# Patient Record
Sex: FEMALE | Race: WHITE | Employment: OTHER | URBAN - NONMETROPOLITAN AREA
[De-identification: names, ages, dates, MRNs, and addresses within clinical notes are randomized per-mention and may not be internally consistent; named-entity substitution may affect disease eponyms.]

---

## 2017-04-11 ENCOUNTER — HOSPITAL ENCOUNTER (OUTPATIENT)
Dept: LAB | Age: 62
Discharge: HOME | End: 2017-04-11
Payer: MEDICAID

## 2017-04-11 LAB
INR BLD: 2.8 (ref 0.9–1.2)
PROTHROMBIN TIME: 30.6 SEC (ref 9.7–12.2)

## 2017-04-11 PROCEDURE — 36415 COLL VENOUS BLD VENIPUNCTURE: CPT

## 2017-04-11 PROCEDURE — 85610 PROTHROMBIN TIME: CPT

## 2017-05-15 RX ORDER — PAROXETINE HYDROCHLORIDE 20 MG/1
20 TABLET, FILM COATED ORAL EVERY MORNING
COMMUNITY
End: 2020-08-06

## 2017-05-16 ENCOUNTER — HOSPITAL ENCOUNTER (OUTPATIENT)
Dept: NON INVASIVE DIAGNOSTICS | Age: 62
Discharge: HOME | End: 2017-05-16
Payer: MEDICAID

## 2017-05-16 VITALS
RESPIRATION RATE: 14 BRPM | TEMPERATURE: 96.8 F | HEART RATE: 54 BPM | DIASTOLIC BLOOD PRESSURE: 63 MMHG | OXYGEN SATURATION: 100 % | SYSTOLIC BLOOD PRESSURE: 135 MMHG

## 2017-05-16 LAB
LV EF: 65 %
LVEF MODALITY: NORMAL

## 2017-05-16 PROCEDURE — 6360000002 HC RX W HCPCS: Performed by: INTERNAL MEDICINE

## 2017-05-16 PROCEDURE — 93312 ECHO TRANSESOPHAGEAL: CPT

## 2017-05-16 PROCEDURE — 7100000010 HC PHASE II RECOVERY - FIRST 15 MIN

## 2017-05-16 PROCEDURE — 7100000011 HC PHASE II RECOVERY - ADDTL 15 MIN

## 2017-05-16 PROCEDURE — 6360000002 HC RX W HCPCS

## 2017-05-16 RX ORDER — FENTANYL CITRATE 50 UG/ML
150 INJECTION, SOLUTION INTRAMUSCULAR; INTRAVENOUS ONCE
Status: COMPLETED | OUTPATIENT
Start: 2017-05-16 | End: 2017-05-16

## 2017-05-16 RX ORDER — SODIUM CHLORIDE 0.9 % (FLUSH) 0.9 %
10 SYRINGE (ML) INJECTION EVERY 12 HOURS SCHEDULED
Status: DISCONTINUED | OUTPATIENT
Start: 2017-05-16 | End: 2017-05-17 | Stop reason: HOSPADM

## 2017-05-16 RX ORDER — SODIUM CHLORIDE 0.9 % (FLUSH) 0.9 %
10 SYRINGE (ML) INJECTION PRN
Status: DISCONTINUED | OUTPATIENT
Start: 2017-05-16 | End: 2017-05-17 | Stop reason: HOSPADM

## 2017-05-16 RX ORDER — MIDAZOLAM HYDROCHLORIDE 1 MG/ML
8 INJECTION INTRAMUSCULAR; INTRAVENOUS ONCE
Status: COMPLETED | OUTPATIENT
Start: 2017-05-16 | End: 2017-05-16

## 2017-05-16 RX ADMIN — FENTANYL CITRATE 150 MCG: 50 INJECTION INTRAMUSCULAR; INTRAVENOUS at 14:54

## 2017-05-16 RX ADMIN — MIDAZOLAM 8 MG: 1 INJECTION INTRAMUSCULAR; INTRAVENOUS at 14:56

## 2017-05-16 ASSESSMENT — PAIN SCALES - GENERAL
PAINLEVEL_OUTOF10: 0
PAINLEVEL_OUTOF10: 2

## 2017-05-16 ASSESSMENT — PAIN DESCRIPTION - DESCRIPTORS: DESCRIPTORS: SORE

## 2017-05-16 ASSESSMENT — PAIN DESCRIPTION - LOCATION: LOCATION: THROAT

## 2017-05-26 ENCOUNTER — HOSPITAL ENCOUNTER (OUTPATIENT)
Dept: CT IMAGING | Age: 62
Discharge: HOME | End: 2017-05-26
Payer: MEDICAID

## 2017-05-26 DIAGNOSIS — I71.20 THORACIC ANEURYSM WITHOUT MENTION OF RUPTURE: ICD-10-CM

## 2017-05-26 DIAGNOSIS — Z95.4 S/P AORTIC VALVE ALLOGRAFT: ICD-10-CM

## 2017-05-26 PROCEDURE — 71275 CT ANGIOGRAPHY CHEST: CPT

## 2017-05-26 PROCEDURE — 6360000004 HC RX CONTRAST MEDICATION: Performed by: INTERNAL MEDICINE

## 2017-05-26 RX ADMIN — IOVERSOL 80 ML: 741 INJECTION INTRA-ARTERIAL; INTRAVENOUS at 11:19

## 2017-06-07 ENCOUNTER — HOSPITAL ENCOUNTER (OUTPATIENT)
Dept: LAB | Age: 62
Discharge: HOME | End: 2017-06-07
Payer: MEDICAID

## 2017-06-07 LAB
INR BLD: 2.4 (ref 0.9–1.2)
PROTHROMBIN TIME: 26.9 SEC (ref 9.7–12.2)

## 2017-06-07 PROCEDURE — 85610 PROTHROMBIN TIME: CPT

## 2017-06-07 PROCEDURE — 36415 COLL VENOUS BLD VENIPUNCTURE: CPT

## 2017-06-30 ENCOUNTER — HOSPITAL ENCOUNTER (OUTPATIENT)
Dept: LAB | Age: 62
Discharge: HOME | End: 2017-06-30
Payer: MEDICAID

## 2017-06-30 LAB
INR BLD: 3.5 (ref 0.9–1.2)
PROTHROMBIN TIME: 39.5 SEC (ref 9.7–12.2)

## 2017-06-30 PROCEDURE — 36415 COLL VENOUS BLD VENIPUNCTURE: CPT

## 2017-06-30 PROCEDURE — 85610 PROTHROMBIN TIME: CPT

## 2017-08-15 ENCOUNTER — HOSPITAL ENCOUNTER (OUTPATIENT)
Dept: LAB | Age: 62
Discharge: HOME | End: 2017-08-15
Payer: MEDICAID

## 2017-08-15 LAB
INR BLD: 2.4 (ref 0.9–1.2)
PROTHROMBIN TIME: 26.9 SEC (ref 9.7–12.2)

## 2017-08-15 PROCEDURE — 36415 COLL VENOUS BLD VENIPUNCTURE: CPT

## 2017-08-15 PROCEDURE — 85610 PROTHROMBIN TIME: CPT

## 2017-09-07 ENCOUNTER — HOSPITAL ENCOUNTER (OUTPATIENT)
Dept: LAB | Age: 62
Discharge: HOME | End: 2017-09-07
Payer: MEDICAID

## 2017-09-07 LAB
INR BLD: 2.3 (ref 0.9–1.2)
PROTHROMBIN TIME: 25.4 SEC (ref 9.7–12.2)

## 2017-09-07 PROCEDURE — 36415 COLL VENOUS BLD VENIPUNCTURE: CPT

## 2017-09-07 PROCEDURE — 85610 PROTHROMBIN TIME: CPT

## 2017-09-21 ENCOUNTER — HOSPITAL ENCOUNTER (OUTPATIENT)
Dept: LAB | Age: 62
Discharge: HOME | End: 2017-09-21
Payer: MEDICAID

## 2017-09-21 LAB
INR BLD: 3.3 (ref 0.9–1.2)
PROTHROMBIN TIME: 37.4 SEC (ref 9.7–12.2)

## 2017-09-21 PROCEDURE — 36415 COLL VENOUS BLD VENIPUNCTURE: CPT

## 2017-09-21 PROCEDURE — 85610 PROTHROMBIN TIME: CPT

## 2017-10-12 ENCOUNTER — HOSPITAL ENCOUNTER (OUTPATIENT)
Dept: LAB | Age: 62
Discharge: HOME | End: 2017-10-12
Payer: MEDICAID

## 2017-10-12 LAB
INR BLD: 2.8 (ref 0.9–1.2)
PROTHROMBIN TIME: 30.9 SEC (ref 9.7–12.2)

## 2017-10-12 PROCEDURE — 36415 COLL VENOUS BLD VENIPUNCTURE: CPT

## 2017-10-12 PROCEDURE — 85610 PROTHROMBIN TIME: CPT

## 2017-11-06 ENCOUNTER — HOSPITAL ENCOUNTER (OUTPATIENT)
Dept: LAB | Age: 62
Discharge: HOME | End: 2017-11-06
Payer: COMMERCIAL

## 2017-11-06 LAB
INR BLD: 4.2 (ref 0.9–1.2)
PROTHROMBIN TIME: 48.4 SEC (ref 9.7–12.2)

## 2017-11-06 PROCEDURE — 85610 PROTHROMBIN TIME: CPT

## 2017-11-06 PROCEDURE — 36415 COLL VENOUS BLD VENIPUNCTURE: CPT

## 2017-11-20 ENCOUNTER — HOSPITAL ENCOUNTER (OUTPATIENT)
Dept: LAB | Age: 62
Discharge: HOME | End: 2017-11-20
Payer: COMMERCIAL

## 2017-11-20 LAB
INR BLD: 4 (ref 0.9–1.2)
PROTHROMBIN TIME: 45 SEC (ref 9.7–12.2)

## 2017-11-20 PROCEDURE — 85610 PROTHROMBIN TIME: CPT

## 2017-11-20 PROCEDURE — 36415 COLL VENOUS BLD VENIPUNCTURE: CPT

## 2017-12-11 ENCOUNTER — HOSPITAL ENCOUNTER (OUTPATIENT)
Dept: LAB | Age: 62
Discharge: HOME | End: 2017-12-11
Payer: COMMERCIAL

## 2017-12-11 LAB
INR BLD: 1.7 (ref 0.9–1.2)
PROTHROMBIN TIME: 18.7 SEC (ref 9.7–12.2)

## 2017-12-11 PROCEDURE — 36415 COLL VENOUS BLD VENIPUNCTURE: CPT

## 2017-12-11 PROCEDURE — 85610 PROTHROMBIN TIME: CPT

## 2018-01-09 ENCOUNTER — HOSPITAL ENCOUNTER (OUTPATIENT)
Dept: LAB | Age: 63
Discharge: HOME | End: 2018-01-09
Payer: COMMERCIAL

## 2018-01-09 LAB
INR BLD: 2.7 (ref 0.9–1.2)
PROTHROMBIN TIME: 29.4 SEC (ref 9.7–12.2)

## 2018-01-09 PROCEDURE — 36415 COLL VENOUS BLD VENIPUNCTURE: CPT

## 2018-01-09 PROCEDURE — 85610 PROTHROMBIN TIME: CPT

## 2018-02-28 ENCOUNTER — HOSPITAL ENCOUNTER (OUTPATIENT)
Dept: LAB | Age: 63
Discharge: HOME | End: 2018-02-28
Payer: COMMERCIAL

## 2018-02-28 LAB
INR BLD: 2.8 (ref 0.9–1.2)
PROTHROMBIN TIME: 27.8 SEC (ref 9.7–12.2)

## 2018-02-28 PROCEDURE — 36415 COLL VENOUS BLD VENIPUNCTURE: CPT

## 2018-02-28 PROCEDURE — 85610 PROTHROMBIN TIME: CPT

## 2018-04-11 ENCOUNTER — HOSPITAL ENCOUNTER (OUTPATIENT)
Dept: LAB | Age: 63
Discharge: HOME | End: 2018-04-11
Payer: COMMERCIAL

## 2018-04-11 LAB
ANION GAP SERPL CALCULATED.3IONS-SCNC: 12 MMOL/L (ref 9–17)
BUN BLDV-MCNC: 28 MG/DL (ref 8–23)
BUN/CREAT BLD: 26 (ref 9–20)
CALCIUM SERPL-MCNC: 10.1 MG/DL (ref 8.6–10.4)
CHLORIDE BLD-SCNC: 101 MMOL/L (ref 98–107)
CO2: 28 MMOL/L (ref 20–31)
CREAT SERPL-MCNC: 1.07 MG/DL (ref 0.5–0.9)
GFR AFRICAN AMERICAN: >60 ML/MIN
GFR NON-AFRICAN AMERICAN: 52 ML/MIN
GFR SERPL CREATININE-BSD FRML MDRD: ABNORMAL ML/MIN/{1.73_M2}
GFR SERPL CREATININE-BSD FRML MDRD: ABNORMAL ML/MIN/{1.73_M2}
GLUCOSE BLD-MCNC: 86 MG/DL (ref 70–99)
POTASSIUM SERPL-SCNC: 4.9 MMOL/L (ref 3.7–5.3)
SODIUM BLD-SCNC: 141 MMOL/L (ref 135–144)

## 2018-04-11 PROCEDURE — 36415 COLL VENOUS BLD VENIPUNCTURE: CPT

## 2018-04-11 PROCEDURE — 80048 BASIC METABOLIC PNL TOTAL CA: CPT

## 2018-04-24 ENCOUNTER — HOSPITAL ENCOUNTER (OUTPATIENT)
Dept: LAB | Age: 63
Discharge: HOME | End: 2018-04-24
Payer: COMMERCIAL

## 2018-04-24 LAB
INR BLD: 2.6 (ref 0.9–1.2)
PROTHROMBIN TIME: 26 SEC (ref 9.7–12.2)

## 2018-04-24 PROCEDURE — 36415 COLL VENOUS BLD VENIPUNCTURE: CPT

## 2018-04-24 PROCEDURE — 85610 PROTHROMBIN TIME: CPT

## 2018-06-04 ENCOUNTER — HOSPITAL ENCOUNTER (OUTPATIENT)
Dept: LAB | Age: 63
Discharge: HOME | End: 2018-06-04
Payer: COMMERCIAL

## 2018-06-04 LAB
INR BLD: 3.8 (ref 0.9–1.2)
PROTHROMBIN TIME: 36.8 SEC (ref 9.7–12.2)

## 2018-06-04 PROCEDURE — 36415 COLL VENOUS BLD VENIPUNCTURE: CPT

## 2018-06-04 PROCEDURE — 85610 PROTHROMBIN TIME: CPT

## 2018-07-10 ENCOUNTER — HOSPITAL ENCOUNTER (OUTPATIENT)
Dept: LAB | Age: 63
Discharge: HOME | End: 2018-07-10
Payer: COMMERCIAL

## 2018-07-10 LAB
INR BLD: 3.3 (ref 0.9–1.2)
PROTHROMBIN TIME: 32.6 SEC (ref 9.7–12.2)

## 2018-07-10 PROCEDURE — 36415 COLL VENOUS BLD VENIPUNCTURE: CPT

## 2018-07-10 PROCEDURE — 85610 PROTHROMBIN TIME: CPT

## 2018-09-06 ENCOUNTER — HOSPITAL ENCOUNTER (OUTPATIENT)
Dept: LAB | Age: 63
Discharge: HOME | End: 2018-09-06
Payer: COMMERCIAL

## 2018-09-06 LAB
INR BLD: 4 (ref 0.9–1.2)
PROTHROMBIN TIME: 38.4 SEC (ref 9.7–12.2)

## 2018-09-06 PROCEDURE — 85610 PROTHROMBIN TIME: CPT

## 2018-09-06 PROCEDURE — 36415 COLL VENOUS BLD VENIPUNCTURE: CPT

## 2018-10-02 ENCOUNTER — HOSPITAL ENCOUNTER (OUTPATIENT)
Dept: LAB | Age: 63
Discharge: HOME | End: 2018-10-02
Payer: COMMERCIAL

## 2018-10-02 LAB
INR BLD: 4.3 (ref 0.9–1.2)
PROTHROMBIN TIME: 41.5 SEC (ref 9.7–12.2)

## 2018-10-02 PROCEDURE — 36415 COLL VENOUS BLD VENIPUNCTURE: CPT

## 2018-10-02 PROCEDURE — 85610 PROTHROMBIN TIME: CPT

## 2018-11-09 ENCOUNTER — HOSPITAL ENCOUNTER (OUTPATIENT)
Dept: LAB | Age: 63
Discharge: HOME | End: 2018-11-09
Payer: COMMERCIAL

## 2018-11-09 LAB
INR BLD: 2.3 (ref 0.9–1.2)
PROTHROMBIN TIME: 23.4 SEC (ref 9.7–12.2)

## 2018-11-09 PROCEDURE — 85610 PROTHROMBIN TIME: CPT

## 2018-11-09 PROCEDURE — 36415 COLL VENOUS BLD VENIPUNCTURE: CPT

## 2018-12-27 ENCOUNTER — HOSPITAL ENCOUNTER (OUTPATIENT)
Dept: LAB | Age: 63
Discharge: HOME | End: 2018-12-27
Payer: COMMERCIAL

## 2018-12-27 LAB
INR BLD: 3 (ref 0.9–1.2)
PROTHROMBIN TIME: 29.4 SEC (ref 9.7–12.2)

## 2018-12-27 PROCEDURE — 36415 COLL VENOUS BLD VENIPUNCTURE: CPT

## 2018-12-27 PROCEDURE — 85610 PROTHROMBIN TIME: CPT

## 2019-02-25 ENCOUNTER — HOSPITAL ENCOUNTER (OUTPATIENT)
Dept: LAB | Age: 64
Discharge: HOME | End: 2019-02-25
Payer: COMMERCIAL

## 2019-02-25 LAB
INR BLD: 2.4 (ref 0.9–1.2)
PROTHROMBIN TIME: 23.9 SEC (ref 9.7–12.2)

## 2019-02-25 PROCEDURE — 36415 COLL VENOUS BLD VENIPUNCTURE: CPT

## 2019-02-25 PROCEDURE — 85610 PROTHROMBIN TIME: CPT

## 2019-03-27 ENCOUNTER — HOSPITAL ENCOUNTER (OUTPATIENT)
Dept: LAB | Age: 64
Discharge: HOME | End: 2019-03-27
Payer: COMMERCIAL

## 2019-03-27 LAB
INR BLD: 3.5 (ref 0.9–1.2)
PROTHROMBIN TIME: 34.5 SEC (ref 9.7–12.2)

## 2019-03-27 PROCEDURE — 36415 COLL VENOUS BLD VENIPUNCTURE: CPT

## 2019-03-27 PROCEDURE — 85610 PROTHROMBIN TIME: CPT

## 2019-05-22 ENCOUNTER — HOSPITAL ENCOUNTER (OUTPATIENT)
Dept: LAB | Age: 64
Discharge: HOME | End: 2019-05-22
Payer: COMMERCIAL

## 2019-05-22 LAB
INR BLD: 3.1 (ref 0.9–1.2)
PROTHROMBIN TIME: 30.4 SEC (ref 9.7–12.2)

## 2019-05-22 PROCEDURE — 85610 PROTHROMBIN TIME: CPT

## 2019-05-22 PROCEDURE — 36415 COLL VENOUS BLD VENIPUNCTURE: CPT

## 2019-07-03 ENCOUNTER — HOSPITAL ENCOUNTER (OUTPATIENT)
Dept: LAB | Age: 64
Discharge: HOME | End: 2019-07-03
Payer: COMMERCIAL

## 2019-07-03 LAB
INR BLD: 2.4 (ref 0.9–1.2)
PROTHROMBIN TIME: 23.7 SEC (ref 9.7–12.2)

## 2019-07-03 PROCEDURE — 36415 COLL VENOUS BLD VENIPUNCTURE: CPT

## 2019-07-03 PROCEDURE — 85610 PROTHROMBIN TIME: CPT

## 2019-07-31 ENCOUNTER — HOSPITAL ENCOUNTER (OUTPATIENT)
Dept: LAB | Age: 64
Discharge: HOME | End: 2019-07-31
Payer: COMMERCIAL

## 2019-07-31 LAB
INR BLD: 5.3 (ref 0.9–1.2)
PROTHROMBIN TIME: 50.7 SEC (ref 9.7–12.2)

## 2019-07-31 PROCEDURE — 36415 COLL VENOUS BLD VENIPUNCTURE: CPT

## 2019-07-31 PROCEDURE — 85610 PROTHROMBIN TIME: CPT

## 2019-08-07 ENCOUNTER — HOSPITAL ENCOUNTER (OUTPATIENT)
Dept: LAB | Age: 64
Discharge: HOME | End: 2019-08-07
Payer: COMMERCIAL

## 2019-08-07 LAB
INR BLD: 4.7 (ref 0.9–1.2)
PROTHROMBIN TIME: 45.3 SEC (ref 9.7–12.2)

## 2019-08-07 PROCEDURE — 36415 COLL VENOUS BLD VENIPUNCTURE: CPT

## 2019-08-07 PROCEDURE — 85610 PROTHROMBIN TIME: CPT

## 2019-08-14 ENCOUNTER — HOSPITAL ENCOUNTER (OUTPATIENT)
Dept: LAB | Age: 64
Discharge: HOME | End: 2019-08-14
Payer: COMMERCIAL

## 2019-08-14 LAB
INR BLD: 2.6 (ref 0.9–1.2)
PROTHROMBIN TIME: 25.7 SEC (ref 9.7–12.2)

## 2019-08-14 PROCEDURE — 36415 COLL VENOUS BLD VENIPUNCTURE: CPT

## 2019-08-14 PROCEDURE — 85610 PROTHROMBIN TIME: CPT

## 2019-09-10 ENCOUNTER — HOSPITAL ENCOUNTER (OUTPATIENT)
Dept: LAB | Age: 64
Discharge: HOME | End: 2019-09-10
Payer: COMMERCIAL

## 2019-09-10 LAB
INR BLD: 2.2 (ref 0.9–1.2)
PROTHROMBIN TIME: 21.9 SEC (ref 9.7–12.2)

## 2019-09-10 PROCEDURE — 85610 PROTHROMBIN TIME: CPT

## 2019-09-10 PROCEDURE — 36415 COLL VENOUS BLD VENIPUNCTURE: CPT

## 2019-10-25 ENCOUNTER — HOSPITAL ENCOUNTER (OUTPATIENT)
Age: 64
Discharge: HOME | End: 2019-10-25
Payer: COMMERCIAL

## 2019-10-25 LAB
INR BLD: 3.2 (ref 0.9–1.2)
PROTHROMBIN TIME: 31.4 SEC (ref 9.7–12.2)

## 2019-10-25 PROCEDURE — 85610 PROTHROMBIN TIME: CPT

## 2019-10-25 PROCEDURE — 36415 COLL VENOUS BLD VENIPUNCTURE: CPT

## 2019-12-03 ENCOUNTER — HOSPITAL ENCOUNTER (OUTPATIENT)
Dept: LAB | Age: 64
Discharge: HOME | End: 2019-12-03
Payer: COMMERCIAL

## 2019-12-03 LAB
INR BLD: 2.5 (ref 0.9–1.2)
PROTHROMBIN TIME: 24.7 SEC (ref 9.7–12.2)

## 2019-12-03 PROCEDURE — 85610 PROTHROMBIN TIME: CPT

## 2019-12-03 PROCEDURE — 36415 COLL VENOUS BLD VENIPUNCTURE: CPT

## 2020-01-14 ENCOUNTER — HOSPITAL ENCOUNTER (OUTPATIENT)
Dept: LAB | Age: 65
Discharge: HOME | End: 2020-01-14
Payer: COMMERCIAL

## 2020-01-14 LAB
INR BLD: 2.3 (ref 0.9–1.2)
PROTHROMBIN TIME: 23.3 SEC (ref 9.7–12.2)

## 2020-01-14 PROCEDURE — 36415 COLL VENOUS BLD VENIPUNCTURE: CPT

## 2020-01-14 PROCEDURE — 85610 PROTHROMBIN TIME: CPT

## 2020-02-12 ENCOUNTER — HOSPITAL ENCOUNTER (OUTPATIENT)
Dept: LAB | Age: 65
Discharge: HOME | End: 2020-02-12
Payer: COMMERCIAL

## 2020-02-12 LAB
ABSOLUTE EOS #: 0.13 K/UL (ref 0–0.44)
ABSOLUTE IMMATURE GRANULOCYTE: <0.03 K/UL (ref 0–0.3)
ABSOLUTE LYMPH #: 0.87 K/UL (ref 1.1–3.7)
ABSOLUTE MONO #: 0.49 K/UL (ref 0.1–1.2)
ALBUMIN SERPL-MCNC: 4.9 G/DL (ref 3.5–5.2)
ALBUMIN/GLOBULIN RATIO: 1.9 (ref 1–2.5)
ALP BLD-CCNC: 72 U/L (ref 35–104)
ALT SERPL-CCNC: 17 U/L (ref 5–33)
ANION GAP SERPL CALCULATED.3IONS-SCNC: 12 MMOL/L (ref 9–17)
AST SERPL-CCNC: 26 U/L
BASOPHILS # BLD: 1 % (ref 0–2)
BASOPHILS ABSOLUTE: 0.04 K/UL (ref 0–0.2)
BILIRUB SERPL-MCNC: 0.36 MG/DL (ref 0.3–1.2)
BUN BLDV-MCNC: 25 MG/DL (ref 8–23)
BUN/CREAT BLD: 26 (ref 9–20)
CALCIUM SERPL-MCNC: 9.8 MG/DL (ref 8.6–10.4)
CHLORIDE BLD-SCNC: 104 MMOL/L (ref 98–107)
CHOLESTEROL/HDL RATIO: 2.4
CHOLESTEROL: 246 MG/DL
CO2: 27 MMOL/L (ref 20–31)
CREAT SERPL-MCNC: 0.97 MG/DL (ref 0.5–0.9)
DIFFERENTIAL TYPE: ABNORMAL
EOSINOPHILS RELATIVE PERCENT: 3 % (ref 1–4)
GFR AFRICAN AMERICAN: >60 ML/MIN
GFR NON-AFRICAN AMERICAN: 58 ML/MIN
GFR SERPL CREATININE-BSD FRML MDRD: ABNORMAL ML/MIN/{1.73_M2}
GFR SERPL CREATININE-BSD FRML MDRD: ABNORMAL ML/MIN/{1.73_M2}
GLUCOSE BLD-MCNC: 101 MG/DL (ref 70–99)
HCT VFR BLD CALC: 44.1 % (ref 36.3–47.1)
HDLC SERPL-MCNC: 103 MG/DL
HEMOGLOBIN: 14 G/DL (ref 11.9–15.1)
IMMATURE GRANULOCYTES: 1 %
INR BLD: 3.1 (ref 0.9–1.2)
LDL CHOLESTEROL: 129 MG/DL (ref 0–130)
LYMPHOCYTES # BLD: 21 % (ref 24–43)
MCH RBC QN AUTO: 32 PG (ref 25.2–33.5)
MCHC RBC AUTO-ENTMCNC: 31.7 G/DL (ref 28.4–34.8)
MCV RBC AUTO: 100.7 FL (ref 82.6–102.9)
MONOCYTES # BLD: 12 % (ref 3–12)
NRBC AUTOMATED: 0 PER 100 WBC
PDW BLD-RTO: 12.5 % (ref 11.8–14.4)
PLATELET # BLD: ABNORMAL K/UL (ref 138–453)
PLATELET ESTIMATE: ABNORMAL
PLATELET, FLUORESCENCE: 125 K/UL (ref 138–453)
PLATELET, IMMATURE FRACTION: 2.8 % (ref 1.1–10.3)
PMV BLD AUTO: ABNORMAL FL (ref 8.1–13.5)
POTASSIUM SERPL-SCNC: 4.5 MMOL/L (ref 3.7–5.3)
PROTHROMBIN TIME: 30.8 SEC (ref 9.7–12.2)
RBC # BLD: 4.38 M/UL (ref 3.95–5.11)
RBC # BLD: ABNORMAL 10*6/UL
SEG NEUTROPHILS: 63 % (ref 36–65)
SEGMENTED NEUTROPHILS ABSOLUTE COUNT: 2.69 K/UL (ref 1.5–8.1)
SODIUM BLD-SCNC: 143 MMOL/L (ref 135–144)
TOTAL PROTEIN: 7.5 G/DL (ref 6.4–8.3)
TRIGL SERPL-MCNC: 72 MG/DL
VITAMIN D 25-HYDROXY: 110.1 NG/ML (ref 30–100)
VLDLC SERPL CALC-MCNC: ABNORMAL MG/DL (ref 1–30)
WBC # BLD: 4.2 K/UL (ref 3.5–11.3)
WBC # BLD: ABNORMAL 10*3/UL

## 2020-02-12 PROCEDURE — 82306 VITAMIN D 25 HYDROXY: CPT

## 2020-02-12 PROCEDURE — 85055 RETICULATED PLATELET ASSAY: CPT

## 2020-02-12 PROCEDURE — 80061 LIPID PANEL: CPT

## 2020-02-12 PROCEDURE — 80053 COMPREHEN METABOLIC PANEL: CPT

## 2020-02-12 PROCEDURE — 85025 COMPLETE CBC W/AUTO DIFF WBC: CPT

## 2020-02-12 PROCEDURE — 36415 COLL VENOUS BLD VENIPUNCTURE: CPT

## 2020-02-12 PROCEDURE — 85610 PROTHROMBIN TIME: CPT

## 2020-03-24 ENCOUNTER — HOSPITAL ENCOUNTER (OUTPATIENT)
Dept: LAB | Age: 65
Discharge: HOME | End: 2020-03-24
Payer: COMMERCIAL

## 2020-03-24 LAB
INR BLD: 3.8 (ref 0.9–1.2)
PROTHROMBIN TIME: 37.4 SEC (ref 9.7–12.2)

## 2020-03-24 PROCEDURE — 85610 PROTHROMBIN TIME: CPT

## 2020-03-24 PROCEDURE — 36415 COLL VENOUS BLD VENIPUNCTURE: CPT

## 2020-04-27 ENCOUNTER — HOSPITAL ENCOUNTER (OUTPATIENT)
Dept: LAB | Age: 65
Discharge: HOME | End: 2020-04-27
Payer: COMMERCIAL

## 2020-04-27 LAB
INR BLD: 3.5 (ref 0.9–1.2)
PROTHROMBIN TIME: 34.1 SEC (ref 9.7–12.2)

## 2020-04-27 PROCEDURE — 85610 PROTHROMBIN TIME: CPT

## 2020-04-27 PROCEDURE — 36415 COLL VENOUS BLD VENIPUNCTURE: CPT

## 2020-06-01 ENCOUNTER — HOSPITAL ENCOUNTER (OUTPATIENT)
Dept: LAB | Age: 65
Discharge: HOME | End: 2020-06-01
Payer: COMMERCIAL

## 2020-06-01 LAB
INR BLD: 2.4
PROTHROMBIN TIME: 26 SEC (ref 11.8–14.6)

## 2020-06-01 PROCEDURE — 36415 COLL VENOUS BLD VENIPUNCTURE: CPT

## 2020-06-01 PROCEDURE — 85610 PROTHROMBIN TIME: CPT

## 2020-08-06 ENCOUNTER — HOSPITAL ENCOUNTER (OUTPATIENT)
Age: 65
Discharge: HOME | End: 2020-08-06
Payer: COMMERCIAL

## 2020-08-06 ENCOUNTER — HOSPITAL ENCOUNTER (OUTPATIENT)
Dept: NON INVASIVE DIAGNOSTICS | Age: 65
Discharge: HOME | End: 2020-08-06
Payer: COMMERCIAL

## 2020-08-06 ENCOUNTER — OFFICE VISIT (OUTPATIENT)
Dept: CARDIOLOGY | Age: 65
End: 2020-08-06
Payer: COMMERCIAL

## 2020-08-06 VITALS
BODY MASS INDEX: 19.43 KG/M2 | OXYGEN SATURATION: 99 % | HEIGHT: 64 IN | RESPIRATION RATE: 16 BRPM | SYSTOLIC BLOOD PRESSURE: 152 MMHG | WEIGHT: 113.8 LBS | DIASTOLIC BLOOD PRESSURE: 67 MMHG | HEART RATE: 67 BPM

## 2020-08-06 LAB
ANION GAP SERPL CALCULATED.3IONS-SCNC: 10 MMOL/L (ref 9–17)
BUN BLDV-MCNC: 23 MG/DL (ref 8–23)
BUN/CREAT BLD: 29 (ref 9–20)
CALCIUM SERPL-MCNC: 10 MG/DL (ref 8.6–10.4)
CHLORIDE BLD-SCNC: 106 MMOL/L (ref 98–107)
CHOLESTEROL/HDL RATIO: 2.6
CHOLESTEROL: 249 MG/DL
CO2: 27 MMOL/L (ref 20–31)
CREAT SERPL-MCNC: 0.79 MG/DL (ref 0.5–0.9)
GFR AFRICAN AMERICAN: >60 ML/MIN
GFR NON-AFRICAN AMERICAN: >60 ML/MIN
GFR SERPL CREATININE-BSD FRML MDRD: ABNORMAL ML/MIN/{1.73_M2}
GFR SERPL CREATININE-BSD FRML MDRD: ABNORMAL ML/MIN/{1.73_M2}
GLUCOSE BLD-MCNC: 100 MG/DL (ref 70–99)
HCT VFR BLD CALC: 40.5 % (ref 36.3–47.1)
HDLC SERPL-MCNC: 95 MG/DL
HEMOGLOBIN: 13.2 G/DL (ref 11.9–15.1)
LDL CHOLESTEROL: 130 MG/DL (ref 0–130)
MCH RBC QN AUTO: 32.2 PG (ref 25.2–33.5)
MCHC RBC AUTO-ENTMCNC: 32.6 G/DL (ref 28.4–34.8)
MCV RBC AUTO: 98.8 FL (ref 82.6–102.9)
NRBC AUTOMATED: 0 PER 100 WBC
PDW BLD-RTO: 12.6 % (ref 11.8–14.4)
PLATELET # BLD: 133 K/UL (ref 138–453)
PMV BLD AUTO: 10.5 FL (ref 8.1–13.5)
POTASSIUM SERPL-SCNC: 4.5 MMOL/L (ref 3.7–5.3)
RBC # BLD: 4.1 M/UL (ref 3.95–5.11)
SODIUM BLD-SCNC: 143 MMOL/L (ref 135–144)
TRIGL SERPL-MCNC: 122 MG/DL
VLDLC SERPL CALC-MCNC: ABNORMAL MG/DL (ref 1–30)
WBC # BLD: 5.1 K/UL (ref 3.5–11.3)

## 2020-08-06 PROCEDURE — 80061 LIPID PANEL: CPT

## 2020-08-06 PROCEDURE — 93000 ELECTROCARDIOGRAM COMPLETE: CPT | Performed by: INTERNAL MEDICINE

## 2020-08-06 PROCEDURE — 80048 BASIC METABOLIC PNL TOTAL CA: CPT

## 2020-08-06 PROCEDURE — 36415 COLL VENOUS BLD VENIPUNCTURE: CPT

## 2020-08-06 PROCEDURE — 99204 OFFICE O/P NEW MOD 45 MIN: CPT | Performed by: INTERNAL MEDICINE

## 2020-08-06 PROCEDURE — 0296T HC EXT ECG RECORDING 2-21 DAY HOOKUP: CPT

## 2020-08-06 PROCEDURE — 93005 ELECTROCARDIOGRAM TRACING: CPT | Performed by: INTERNAL MEDICINE

## 2020-08-06 PROCEDURE — 85027 COMPLETE CBC AUTOMATED: CPT

## 2020-08-06 RX ORDER — POTASSIUM CHLORIDE 7.45 MG/ML
10 INJECTION INTRAVENOUS ONCE
COMMUNITY
End: 2020-08-11 | Stop reason: ALTCHOICE

## 2020-08-06 RX ORDER — FUROSEMIDE 20 MG/1
20 TABLET ORAL PRN
COMMUNITY
End: 2020-08-31 | Stop reason: ALTCHOICE

## 2020-08-06 RX ORDER — ATORVASTATIN CALCIUM 20 MG/1
20 TABLET, FILM COATED ORAL DAILY
Qty: 30 TABLET | Refills: 3 | Status: SHIPPED | OUTPATIENT
Start: 2020-08-06 | End: 2020-12-02

## 2020-08-06 RX ORDER — MAGNESIUM OXIDE 400 MG/1
400 TABLET ORAL DAILY
COMMUNITY
End: 2022-07-05

## 2020-08-06 RX ORDER — CARVEDILOL 6.25 MG/1
6.25 TABLET ORAL 2 TIMES DAILY
Qty: 90 TABLET | Refills: 3 | Status: SHIPPED | OUTPATIENT
Start: 2020-08-06 | End: 2021-02-04 | Stop reason: SDUPTHER

## 2020-08-06 RX ORDER — M-VIT,TX,IRON,MINS/CALC/FOLIC 27MG-0.4MG
1 TABLET ORAL DAILY
COMMUNITY

## 2020-08-06 RX ORDER — DIPHENHYDRAMINE HYDROCHLORIDE 25 MG/1
2 TABLET ORAL DAILY
COMMUNITY
End: 2021-05-25 | Stop reason: DRUGHIGH

## 2020-08-06 RX ORDER — LOSARTAN POTASSIUM 25 MG/1
25 TABLET ORAL DAILY
Qty: 90 TABLET | Refills: 3 | Status: SHIPPED | OUTPATIENT
Start: 2020-08-06 | End: 2021-07-30 | Stop reason: SDUPTHER

## 2020-08-06 NOTE — PROGRESS NOTES
Isis De Anda am scribing for and in the presence of Sunil Estrada MD, F.A.C.C..    Patient: Octaviano Yoo  : 1955  Date of Visit: 2020    REASON FOR VISIT / CONSULTATION: Establish Cardiologist (Rhuematic fever 50 years ago. Hx:CAD,CHF. Lightheaded/dizziness sometimes. Palpitations felt every morning. Denies: CP, SOB. )      History of Present Illness:        Dear Duane Hopping, APRN - CNP      I had the pleasure of seeing Octaviano Yoo in my office today. Ms. Nicole Cantrell is a 63 y.o. female who presented for evaluation and establish care. She has extensive cardiac history. History of rheumatic fever diagnosed in . She had ischemic colitis back in , this was probably embolic from atrial fibrillation. History of open heart surgery with 2 vessel bypass (SVG to LAD and SVG to OM1), aortic valve replacement with 19 mm Saint Zhao mechanical valve, mitral valve replacement with 27 mm Saint Zhao mechanical valve and maze procedure on 2008. Aortic valve replacement with 21 Amy-Barnett aortic valve and aortic root replacement with bovine patch in addition to tricuspid valve repair in     She has been maintained on warfarin since her episode of ischemic colitis and Aortic/mitral valve replacement with no bleeding complications. She told me that she has never been taking statin therapy. I looked back in the note from Dr. Farooq Hinson, the only thing I saw was fish oil. Patient had CABGx2 at the time of her mitral and aortic valve replacement. She has history of hypertension. Denied history of diabetes or dyslipidemia. Other relevant medical history includes migraine headaches and anxiety. She said she stopped taking Paxil because it made her gaining weight. Exercise Tolerance: Ms. Nicole Cantrell reports that she has a fairly good exercise tolerance.  Her says that she could walk 1 mile without developing chest discomfort or significant shortness of breath. Ms. Norm Handy is here today to establish care. She was following with Dr. Brooks Higginbotham in [place]. She does have some lightheaded/dizziness that comes and goes (once or twice a day- the room spins). No presyncope or syncopal episodes. She also feels palpitations every morning. She has lost weight since she stopped taking Paxil. She knows she is not drinking nearly enough fluids. She denies any chest pain, pressure or tightness. No significant shortness of breath. No orthopnea or PND. No fever or cough. No abdominal pain, nausea or vomiting. No problem moving her bowel. No joint or back pain. No bleeding complications from taking aspirin and warfarin. PAST MEDICAL HISTORY:         Past Medical History:   Diagnosis Date    Atrial fibrillation ([facility]     CAD (coronary artery disease)     Congestive heart failure (CHF) ([facility]     Depression 2015    Hypertension     Ischemic colitis ([facility] ,     Migraine     Migraine     Rheumatic fever        CURRENT ALLERGIES: Patient has no known allergies. REVIEW OF SYSTEMS: 14 systems were reviewed. Pertinent positives and negatives as above, all else negative.      Past Surgical History:   Procedure Laterality Date    CARDIAC VALVE REPLACEMENT      aortic and mitral    CHOLECYSTECTOMY      COLONOSCOPY      OVARIAN CYST REMOVAL Right     PRE-MALIGNANT / BENIGN SKIN LESION EXCISION Left 13    face    TONSILLECTOMY AND ADENOIDECTOMY      TRANSESOPHAGEAL ECHOCARDIOGRAM  2017    TUBAL LIGATION      Social History:  Social History     Tobacco Use    Smoking status: Former Smoker     Packs/day: 0.50     Years: 20.00     Pack years: 10.00     Last attempt to quit: 3/28/1993     Years since quittin.3    Smokeless tobacco: Never Used   Substance Use Topics    Alcohol use: No    Drug use: No        CURRENT MEDICATIONS:        Outpatient Medications Marked as Taking for the 20 encounter (Office Visit) with Fabby Jamil MD   Medication Sig Dispense Refill    metoprolol tartrate (LOPRESSOR) 12.5 mg TABS Take 12.5 mg by mouth 2 times daily      magnesium oxide (MAG-OX) 400 MG tablet Take 400 mg by mouth daily      Biotin (BIOTIN 5000) 5 MG CAPS Take 5,000 mcg by mouth daily      Multiple Vitamins-Minerals (THERAPEUTIC MULTIVITAMIN-MINERALS) tablet Take 1 tablet by mouth daily      MAGNESIUM OXIDE PO Take 400 mg by mouth daily       omeprazole (PRILOSEC) 20 MG capsule Take 1 capsule by mouth Daily 30 capsule 3    oxybutynin (DITROPAN) 5 MG tablet Take 1 tablet by mouth daily (Patient taking differently: Take 5 mg by mouth daily as needed ) 90 tablet 1    warfarin (COUMADIN) 5 MG tablet   Take 5 mg by mouth daily at 1800       aspirin 81 MG tablet Take 162 mg by mouth once          FAMILY HISTORY: family history includes Cancer (age of onset: 40) in her mother; Cancer (age of onset: 61) in her sister; Diabetes in her father. Physical Examination:     BP (!) 152/67 (Site: Right Upper Arm, Position: Sitting, Cuff Size: Medium Adult)   Pulse 67   Resp 16   Ht 5' 4\" (1.626 m)   Wt 113 lb 12.8 oz (51.6 kg)   SpO2 99%   BMI 19.53 kg/m²  Body mass index is 19.53 kg/m². Constitutional: She appeared oriented to person and place. She appears well-developed and well-nourished. In no acute distress. HEENT: Normocephalic and atraumatic. No JVD present. Carotid bruit is not present. No mass and no thyromegaly present. No lymphadenopathy noted. Cardiovascular: Normal rate, regular rhythm, mechanical S1. Loud S2. Short ejection systolic murmur heard at the second left intercostal space without radiation. Pulmonary/Chest: Effort normal and breath sounds normal. No respiratory distress. She has no wheezes, rhonchi or rales. Abdominal: Soft, non-tender. She exhibits no organomegaly, mass or bruit. Extremities: No edema. No cyanosis or clubbing. 2+ radial and carotid pulses.  Distal extremity pulses: 2+ EDT    Click here for a link to the UpToDate guideline \"Atrial Fibrillation: Anticoagulation therapy to prevent embolization  Disclaimer: Risk Score calculation is dependent on accuracy of patient problem list and past encounter diagnosis. Anticoagulation: warfarin (Coumadin) take as directed (goal INR 2.5-3.5)  Additional Testing List: I took the liberty of ordering a BMP for today to assess their potassium and renal function and I took the liberty of ordering a CBC and I ordered an EVENT MONITOR to try and pinpoint the etiology of their symptoms     Atherosclerotic Heart Disease: S/P CABG x2 SVG to LAD and SVG to OM1 in 2008.  Antiplatelet Agent: Continue aspirin 162 mg   Beta Blocker: Change Lopressor to carvedilol 6.25 mg twice daily   Anti-anginal medications: Not indicated at this time.  Cholesterol Reduction Therapy: I told her that in a patient who has history of bypass surgery should be on a statin therapy. I will order lipid panel and I will start her on statin therapy as soon as I get the result. This is to determine the dose of the statins needed.  Additional counseling: I advised them to call our office or go to the emergency room if they developed worsening or persistent chest pain or increased shortness of breath as this could be life threatening. Essential Hypertension: Uncontrolled  Beta Blocker: Change metoprolol to carvedilol 6.25 mg twice daily  ACE Inibitor/ARB: START losartan (Cozaar) 25 mg daily. · Mitral and aortic valve replacement  · Chronic anticoagulation  · Referral placed to Coumadin Clinic for INR, goal INR is 2.5-3.5. · Currently compensated. No evidence of volume overload. · Continue carvedilol and losartan as outlined above.  Paroxysmal Recurrent intermittent palpitations: Rate Control Symptomatic  · Beta Blocker: Continue carvedilol as outlined above. · Counseled regarding adequate hydration.   · I ordered a CAM monitor to assess her PVCs burden, patient has unifocal PVCs on her ECG today. Finally, I recommended that she continue her current medications and follow up with you as previously scheduled. FOLLOW UP:   I told Ms. Guillermina Sommer to call my office if she had any problems, but otherwise I asked her to No follow-ups on file. However, I would be happy to see her sooner should the need arise. Sincerely,  Max Lopes MD, F.A.C.C. [facility] Cardiology Specialist    [address] [place] [address], [address] [place] [address]  Phone: [phone], Fax: [phone]     I believe that the risk of significant morbidity and mortality related to the patient's current medical conditions are: intermediate-high. >60 minutes were spent with the patient and all of their questions were answered. The documentation recorded by the scribe, accurately and completely reflects the services I personally performed and the decisions made by me. Max Lopes MD, F.A.C.C.  August 6, 2020

## 2020-08-07 ENCOUNTER — ANTI-COAG VISIT (OUTPATIENT)
Dept: PHARMACY | Age: 65
End: 2020-08-07

## 2020-08-07 ENCOUNTER — TELEPHONE (OUTPATIENT)
Dept: CARDIOLOGY | Age: 65
End: 2020-08-07

## 2020-08-07 PROBLEM — I48.91 A-FIB (HCC): Status: ACTIVE | Noted: 2020-08-07

## 2020-08-07 PROBLEM — Z95.2 S/P MVR (MITRAL VALVE REPLACEMENT): Status: ACTIVE | Noted: 2020-08-07

## 2020-08-07 PROBLEM — Z95.2 AORTIC VALVE REPLACED: Status: ACTIVE | Noted: 2020-08-07

## 2020-08-07 NOTE — TELEPHONE ENCOUNTER
----- Message from Tila Finley MD sent at 8/6/2020  7:02 PM EDT -----  Blood work is good except for the cholesterol that is significantly high. Giving her history of bypass surgery, she must start statin therapy. I will send an Rx for Lipitor 20 mg nightly. Please call with questions and/or concerns.   Thank you

## 2020-08-10 ENCOUNTER — TELEPHONE (OUTPATIENT)
Dept: CARDIOLOGY | Age: 65
End: 2020-08-10

## 2020-08-10 ENCOUNTER — TELEPHONE (OUTPATIENT)
Dept: PHARMACY | Age: 65
End: 2020-08-10

## 2020-08-10 NOTE — TELEPHONE ENCOUNTER
This sometimes happen when we switch from metoprolol to carvedilol (this switch is needed for better control of the blood pressure), the palpitations usually gets better once her body get used to the medicine. We will discuss further after the can monitor.   Thank you

## 2020-08-10 NOTE — TELEPHONE ENCOUNTER
Dorothy Jose L wanted you to know she started carvedilol 6.25 mg on Friday and on Sunday her heart began racing, pounding and she felt chest discomfort when it happened. What should she do? Please advise. Thank you!

## 2020-08-11 ENCOUNTER — HOSPITAL ENCOUNTER (OUTPATIENT)
Dept: PHARMACY | Age: 65
Setting detail: THERAPIES SERIES
Discharge: HOME | End: 2020-08-11
Payer: COMMERCIAL

## 2020-08-11 VITALS — TEMPERATURE: 99.2 F

## 2020-08-11 LAB — INR BLD: 4.1

## 2020-08-11 PROCEDURE — 99202 OFFICE O/P NEW SF 15 MIN: CPT | Performed by: FAMILY MEDICINE

## 2020-08-11 PROCEDURE — 85610 PROTHROMBIN TIME: CPT | Performed by: FAMILY MEDICINE

## 2020-08-11 RX ORDER — POTASSIUM CHLORIDE 20 MEQ/1
20 TABLET, EXTENDED RELEASE ORAL DAILY
COMMUNITY
End: 2020-08-31 | Stop reason: ALTCHOICE

## 2020-08-11 NOTE — PROGRESS NOTES
Fingerstick INR drawn per clinic protocol. Patient states no visible blood in urine and no black tarry stool. Denies any missed doses of warfarin. No change in other maintenance medications or in diet. Will continue current warfarin regimen and recheck INR in 2 week(s). Patient acknowledges working in consult agreement with pharmacist as referred by his/her physician. Evin Prabhakar is new to our clinic but has been taking warfarin for approximately 15 years. Patient states her regular dosing is 2.5mg Tuesdays and 5mg all other days. Patient is well versed in medications and foods that affect her warfarin/INR. Patient states \"the humidity also seems to make my INR high\". Patient states she does have some fluctuation at times but generally just skips 1-2 doses and is good. Patient recently started atorvastatin which may increase INR but patient truly feels the humidity is causing the supratherapeutic INR today. Will hold warfarin today then resume previously stable dosing of 2.5mg Tuesdays and 5mg all other days. This may need adjusted at next appt if remains elevated. Patient has history of open heart surgery with 2 vessel bypass, aortic valve replacement (initally St Zhao then replaced with Amy-Barnett valve), aortic root replacement and mitral valve replacement (St Zhao). Patient had rheumatic fever in 1970. Patient has been dosed and monitored by Dr Susy Gutierrez in [place] until recently when he moved from area. Dr Jason Rosenthal saw patient 8/6/20 for initial visit and referred patient to our clinic at that time with INR goal of 2.5-3.5. Patient had several medication changes at appt with Dr Jason Rosenthal; d/c'd metoprolol, started carvedilol 6.25mg BID, losartan 25mg daily, atorvastatin 20mg nightly. Patient takes furosemide only when she has a 3 pound or more weight gain and has not needed since Spring (generally weighs 111 to 113 lbs). Patient only takes a single KCl dose when taking furosemide.         Warfarin Patient Education: The following education has been conducted during the clinic appointment as indicated. Iqra Leggett 8/11/2020, 2:55 PM  1. Patient has been educated that warfarin is a blood thinner. 2. Patient has been educated on why he or she has been prescribed warfarin. 3. Patient has been educated that warfarin can cause bleeding. 4. Patient has been educated on the necessity of regular INR monitoring. 5. Patient has been educated to take the exact amount of warfarin prescribed each day and that the dosing regimen could possibly vary from day to day. 6. Patient has been educated that medication aids such as pill boxes, calendars, etc., are a good idea to aid with warfarin therapy. 7. Patient has been educated on his or her target INR range. 8. Patient has been educated on the signs of bleeding problems. 9. Patient has been educated to seek immediate medical attention for severe bleeding issues. 10. Patient has been educated that warfarin has many drug interactions. 11. Patient has been educated to notify their provider managing warfarin prior to taking any new medications, including over-the-counter products and herbal products. 12. Patient has been educated to always keep a current medications list.    13. Patient has been educated on the effect of vitamin K on warfarin. 14. Patient has been educated on what foods contain high amounts of vitamin K.    15. Patient has been encouraged to eat a diet with a stable amount of vitamin K intake. 16. Patient has been educated on the effects of alcohol (ethanol) on warfarin. 16. Patient has been educated that warfarin is not intended for pregnant patients or those who plan to become pregnant. 18. Patient has been educated to take warfarin at the same time every day, preferably in the evening. 19. Patient has been educated on what to do if he or she misses a dose of warfarin.     20. Patient has been educated to call the healthcare provider managing warfarin is he or she gets diarrhea, has an infection, or has a fever, as this can affect warfarin responsiveness. 21. Patient has been educated to contact the healthcare provider managing warfarin if he or she will have any planned surgeries or other medical and dental procedures. 22. Patient has been educated to seek medical attention for serious falls, particularly if the fall injures his or her head. 23. Patient has been educated to avoid contact sports and activities that may cause serious injury. 24. Patient has been educated that he or she should either carry an ID badge or bracelet saying he or she is on warfarin. 25. Patient has been instructed that special arrangements may need to be made for monitoring during extended travel and that all extended travel plans should be shared early with his or her anticoagulation provider. 26. Patient has been given written warfarin education materials to supplement verbal education.         CLINICAL PHARMACY CONSULT: MED RECONCILIATION/REVIEW ADDENDUM    For Pharmacy Admin Tracking Only    PHSO: No  Total # of Interventions Recommended: 3  - Decreased Dose #: 1  - Maintenance Safety Lab Monitoring #: 1    Total Interventions Accepted: 4  Time Spent (min): [address], [place] [address]

## 2020-08-11 NOTE — PATIENT INSTRUCTIONS
Please do not take warfarin today then return to your regular dosing of 2.5mg Tuesdays and 5mg all other days. Continue to monitor for signs of bleeding. Return to coumadin clinic in 2 weeks. Kindly contact coumadin clinic at [phone] with any changes or concerns.

## 2020-08-17 ENCOUNTER — TELEPHONE (OUTPATIENT)
Dept: CARDIOLOGY | Age: 65
End: 2020-08-17

## 2020-08-17 NOTE — TELEPHONE ENCOUNTER
----- Message from Susan Duncan MD sent at 8/16/2020  7:21 PM EDT -----  Please have her come to the office anytime and I will print out the results of her heart monitor and explain the findings for her. Nothing bad or a urgent. Just to go over the results with her.   Thank you

## 2020-08-24 ENCOUNTER — TELEPHONE (OUTPATIENT)
Dept: PHARMACY | Age: 65
End: 2020-08-24

## 2020-08-25 ENCOUNTER — HOSPITAL ENCOUNTER (OUTPATIENT)
Dept: PHARMACY | Age: 65
Setting detail: THERAPIES SERIES
Discharge: HOME | End: 2020-08-25
Payer: COMMERCIAL

## 2020-08-25 VITALS — TEMPERATURE: 97.1 F

## 2020-08-25 LAB — INR BLD: 5.6

## 2020-08-25 PROCEDURE — 85610 PROTHROMBIN TIME: CPT

## 2020-08-25 PROCEDURE — 99212 OFFICE O/P EST SF 10 MIN: CPT

## 2020-08-25 NOTE — PROGRESS NOTES
Patient states no visible blood in urine and no black tarry stool. No change in other medications. Patient states that she has been taking warfarin for about 15 years. Patient states \"the humidity seems to make my INR high\". Patient will hold warfarin today and tomorrow. Will return to clinic in 1 week. Patient will decrease weekly warfarin dosage 7.7% to warfarin 2.5 mg Tues and Fri and 5 mg all other days. INR goal 2.5-3.5. Saw patient HARLAN. CLINICAL PHARMACY CONSULT: MED RECONCILIATION/REVIEW ADDENDUM    For Pharmacy Admin Tracking Only    PHSO: No  Total # of Interventions Recommended: 1  - Decreased Dose #: 1  - Maintenance Safety Lab Monitoring #: 1  Total Interventions Accepted: 2  Time Spent (min): [address]  [address]

## 2020-08-25 NOTE — PATIENT INSTRUCTIONS
Please hold warfarin today and tomorrow. Decrease current dose of warfarin as instructed on dosing calendar provided - 2.5 mg every Tuesday and Friday and 5 mg all other days. Return to clinic on Monday, 8/31/20, to recheck INR. Continue to monitor urine and stool for signs and symptoms of bleeding.

## 2020-08-28 ENCOUNTER — TELEPHONE (OUTPATIENT)
Dept: PHARMACY | Age: 65
End: 2020-08-28

## 2020-08-28 NOTE — TELEPHONE ENCOUNTER
Recent Travel Screening and Travel History documentation:     Travel Screening     Question   Response    In the last month, have you been in contact with someone who was confirmed or suspected to have Coronavirus / COVID-19? Unable to assess    Do you have any of the following symptoms? Unable to assess    Have you traveled internationally in the last month? Unable to assess      Travel History   Travel since 07/28/20     No documented travel since 07/28/20         Left message for patient and provided instruction for curbside INR check/ appointment. Instructed patient to notify clinic if fever or s/s respiratory illness.

## 2020-08-31 ENCOUNTER — HOSPITAL ENCOUNTER (OUTPATIENT)
Dept: PHARMACY | Age: 65
Setting detail: THERAPIES SERIES
Discharge: HOME | End: 2020-08-31
Payer: COMMERCIAL

## 2020-08-31 VITALS — TEMPERATURE: 98.6 F

## 2020-08-31 LAB — INR BLD: 2.3

## 2020-08-31 PROCEDURE — 85610 PROTHROMBIN TIME: CPT

## 2020-08-31 PROCEDURE — 99211 OFF/OP EST MAY X REQ PHY/QHP: CPT

## 2020-08-31 NOTE — PATIENT INSTRUCTIONS
Continue to monitor urine and stool. Continue to monitor for signs of bleeding. Return to clinic in 1 week. Continue warfarin 2.5 mg Tues and Fri and 5 mg all other days.

## 2020-09-04 ENCOUNTER — TELEPHONE (OUTPATIENT)
Dept: PHARMACY | Age: 65
End: 2020-09-04

## 2020-09-08 ENCOUNTER — HOSPITAL ENCOUNTER (OUTPATIENT)
Dept: PHARMACY | Age: 65
Setting detail: THERAPIES SERIES
Discharge: HOME | End: 2020-09-08
Payer: COMMERCIAL

## 2020-09-08 VITALS — TEMPERATURE: 99.9 F

## 2020-09-08 LAB — INR BLD: 3.7

## 2020-09-08 PROCEDURE — 99212 OFFICE O/P EST SF 10 MIN: CPT | Performed by: FAMILY MEDICINE

## 2020-09-08 PROCEDURE — 85610 PROTHROMBIN TIME: CPT | Performed by: FAMILY MEDICINE

## 2020-09-08 NOTE — PROGRESS NOTES
Fingerstick INR drawn per clinic protocol. Patient states no visible blood in urine and no black tarry stool. Denies any missed doses of warfarin. No change in other maintenance medications or in diet. Will decrease current warfarin regimen and recheck INR in 1 week(s). Patient hold warfarin today then slightly decrease dose to 2.5mg TRSat, 5mg all other days. Patient denies any increase in bruising and no changes in diet or medications. Patient acknowledges working in consult agreement with pharmacist as referred by his/her physician.   CURBSIDE VISIT    CLINICAL PHARMACY CONSULT: MED RECONCILIATION/REVIEW ADDENDUM    For Pharmacy Admin Tracking Only    PHSO: No  Total # of Interventions Recommended: 1  - Decreased Dose #: 2  - Maintenance Safety Lab Monitoring #: 1    Total Interventions Accepted: 2  Time Spent (min): [address] [facility], [facility] [address]

## 2020-09-08 NOTE — PATIENT INSTRUCTIONS
Do not take warfarin today  Decrease dosing to take 1/2 tablet (2.5mg) warfarin on Tuesday, Thursday and Saturday. Take 1 tablet (5mg) warfarin all other days. Continue to monitor for signs of bleeding. Return to coumadin clinic in 1 week. Kindly contact clinic at [phone] with any changes or concerns.

## 2020-09-09 ENCOUNTER — TELEPHONE (OUTPATIENT)
Dept: CARDIOLOGY | Age: 65
End: 2020-09-09

## 2020-09-09 NOTE — TELEPHONE ENCOUNTER
Spoke with Xuan Israel at [address] [place] [address] who stated she has left multiple attempts to contact pt and also left voice mails.

## 2020-09-14 ENCOUNTER — TELEPHONE (OUTPATIENT)
Dept: PHARMACY | Age: 65
End: 2020-09-14

## 2020-09-14 RX ORDER — WARFARIN SODIUM 5 MG/1
5 TABLET ORAL SEE ADMIN INSTRUCTIONS
Qty: 90 TABLET | Refills: 3 | Status: SHIPPED | OUTPATIENT
Start: 2020-09-14 | End: 2021-09-27 | Stop reason: SDUPTHER

## 2020-09-15 ENCOUNTER — APPOINTMENT (OUTPATIENT)
Dept: PHARMACY | Age: 65
End: 2020-09-15
Payer: COMMERCIAL

## 2020-09-18 ENCOUNTER — TELEPHONE (OUTPATIENT)
Dept: PHARMACY | Age: 65
End: 2020-09-18

## 2020-09-18 NOTE — TELEPHONE ENCOUNTER
Recent Travel Screening and Travel History documentation:     Travel Screening     Question   Response    In the last month, have you been in contact with someone who was confirmed or suspected to have Coronavirus / COVID-19? Unable to assess    Do you have any of the following symptoms? Unable to assess    Have you traveled internationally in the last month? Unable to assess      Travel History   Travel since 08/18/20     No documented travel since 08/18/20         Left message for patient and provided instruction for curbside INR check/ appointment. Instructed patient to notify clinic if fever or s/s respiratory illness.

## 2020-09-21 ENCOUNTER — HOSPITAL ENCOUNTER (OUTPATIENT)
Dept: PHARMACY | Age: 65
Setting detail: THERAPIES SERIES
Discharge: HOME | End: 2020-09-21
Payer: COMMERCIAL

## 2020-09-21 VITALS — TEMPERATURE: 97.9 F

## 2020-09-21 LAB — INR BLD: 3

## 2020-09-21 PROCEDURE — 99211 OFF/OP EST MAY X REQ PHY/QHP: CPT | Performed by: FAMILY MEDICINE

## 2020-09-21 PROCEDURE — 85610 PROTHROMBIN TIME: CPT | Performed by: FAMILY MEDICINE

## 2020-09-21 NOTE — PROGRESS NOTES
Fingerstick INR drawn per clinic protocol. Patient states no visible blood in urine and no black tarry stool. Denies any missed doses of warfarin. No change in other maintenance medications or in diet. Will continue current warfarin regimen of 2.5mg TRSat and 5mg all other days and recheck INR in 3 week(s). Patient had slight fever last week with sore throat. States no symptoms at this time. Patient has lost about 5 lbs in the last month due to sore throat symptoms. Patient is ordering medic alert bracelet this evening. Patient acknowledges working in consult agreement with pharmacist as referred by his/her physician. CURBSIDE VISIT    CLINICAL PHARMACY CONSULT: MED RECONCILIATION/REVIEW ADDENDUM    For Pharmacy Admin Tracking Only    PHSO: No  Total # of Interventions Recommended: 1  - Updated Order #: 0 Updated Order Reason(s):  Other  - Maintenance Safety Lab Monitoring #: 1    Total Interventions Accepted: 2  Time Spent (min): [address] [facility] [address]

## 2020-10-13 ENCOUNTER — TELEPHONE (OUTPATIENT)
Dept: PHARMACY | Age: 65
End: 2020-10-13

## 2020-10-13 NOTE — TELEPHONE ENCOUNTER
COVID-19 phone screening     Call placed to screen patient prior to upcoming Medication Management visit for Anticoagulation on 10/14/20. Does patient have any of the following symptoms? [] Fever    [] Lower respiratory symptoms (SOB, difficulty breathing, cough)  [x] None    Travel Screening completed. Patient instructed to stay in car and call clinic when he/she arrives. Patient instructed to wear mask during visit.     MAMIE Friedman.Sandeep., 10/13/2020,1:58 PM

## 2020-10-14 ENCOUNTER — HOSPITAL ENCOUNTER (OUTPATIENT)
Dept: PHARMACY | Age: 65
Setting detail: THERAPIES SERIES
Discharge: HOME | End: 2020-10-14
Payer: COMMERCIAL

## 2020-10-14 VITALS — TEMPERATURE: 98.1 F

## 2020-10-14 LAB — INR BLD: 2.7

## 2020-10-14 PROCEDURE — 85610 PROTHROMBIN TIME: CPT

## 2020-10-14 PROCEDURE — 99211 OFF/OP EST MAY X REQ PHY/QHP: CPT

## 2020-10-14 NOTE — PATIENT INSTRUCTIONS
Continue to monitor urine and stool. Continue to monitor for signs of bleeding. Return to clinic in 5 weeks. Continue half tablet for 2.5 mg on Tue, Thur, Sat and whole 5 mg tablet all other days.

## 2020-10-14 NOTE — PROGRESS NOTES
[facility]  Medication Management  ANTICOAGULATION    Referring Doctor: Marcus    GOAL INR: 2.5 - 3.5    TODAY'S INR: 2.7       WARFARIN Dosage: Patient will continue half tablet for 2.5 mg on Tue, Thur, Sat and whole 5 mg tablet all other days. Saw patient CURBSIDE. INR (no units)   Date Value   10/14/2020 2.7   09/21/2020 3   09/08/2020 3.7   08/31/2020 2.3   08/25/2020 5.6   08/11/2020 4.1   06/01/2020 2.4       Hemoglobin (g/dL)   Date Value   08/06/2020 13.2   02/12/2020 14.0   05/06/2015 14.5     Hematocrit (%)   Date Value   08/06/2020 40.5   02/12/2020 44.1   05/06/2015 42.5     Platelets (k/uL)   Date Value   08/06/2020 133 (L)   02/12/2020 See Reflexed IPF Result   05/06/2015 175     Notes:    Fingerstick INR drawn per clinic protocol. Patient states no visible blood in urine and no black tarry stool. Denies any missed doses of warfarin. No change in other maintenance medications or in diet. Will recheck INR in 5 weeks. Patient acknowledges working in consult agreement with pharmacist as referred by his/her physician. CLINICAL PHARMACY CONSULT: MED RECONCILIATION/REVIEW ADDENDUM    For Pharmacy Admin Tracking Only    PHSO: No  Total # of Interventions Recommended: 0    - Maintenance Safety Lab Monitoring #: 1  Recommended intervention potential cost savings:   Accepted intervention potential cost savings:    Total Interventions Accepted: 1  Time Spent (min): 30    Britt Dimas PharmD

## 2020-11-17 ENCOUNTER — TELEPHONE (OUTPATIENT)
Dept: PHARMACY | Age: 65
End: 2020-11-17

## 2020-11-17 NOTE — TELEPHONE ENCOUNTER
COVID-19 phone screening     Call placed to screen patient prior to upcoming Medication Management visit for Anticoagulation on 11/18/20. Does patient have any of the following symptoms? [] Fever    [] Lower respiratory symptoms (SOB, difficulty breathing, cough)  [] None  Left message for patient    Travel Screening completed. Patient instructed to wear mask during visit.     MAMIE Skinner.Sandeep., 11/17/2020,11:54 AM

## 2020-11-18 ENCOUNTER — HOSPITAL ENCOUNTER (OUTPATIENT)
Dept: PHARMACY | Age: 65
Setting detail: THERAPIES SERIES
Discharge: HOME | End: 2020-11-18
Payer: COMMERCIAL

## 2020-11-18 VITALS
HEART RATE: 80 BPM | BODY MASS INDEX: 19.43 KG/M2 | SYSTOLIC BLOOD PRESSURE: 155 MMHG | DIASTOLIC BLOOD PRESSURE: 76 MMHG | WEIGHT: 113.2 LBS

## 2020-11-18 LAB — INR BLD: 3.6

## 2020-11-18 PROCEDURE — 85610 PROTHROMBIN TIME: CPT | Performed by: FAMILY MEDICINE

## 2020-11-18 PROCEDURE — 99211 OFF/OP EST MAY X REQ PHY/QHP: CPT | Performed by: FAMILY MEDICINE

## 2020-11-18 NOTE — PROGRESS NOTES
[address] [facility]/[place]  Medication Management  ANTICOAGULATION    Referring Doctor: Dr Isidoro Perez INR: 2.5-3.5    TODAY'S INR: 3.6    WARFARIN Dosage: 2.5mg TRSat, 5mg all other days    INR (no units)   Date Value   11/18/2020 3.6   10/14/2020 2.7   09/21/2020 3   09/08/2020 3.7   08/31/2020 2.3   08/25/2020 5.6   08/11/2020 4.1       Notes:    Fingerstick INR drawn per clinic protocol. Patient states no visible blood in urine and no black tarry stool. Denies any missed doses of warfarin. No change in other maintenance medications or in diet. Will recheck INR in 4 weeks. Patient would like to have a portion of cauliflower today. Patient is planning to get her influenza vaccine and considering pneumonia and shingles vaccines. We had a discussion about each of these. She will likely get these next week. Patient acknowledges working in consult agreement with pharmacist as referred by his/her physician. CLINICAL PHARMACY CONSULT: MED RECONCILIATION/REVIEW ADDENDUM    For Pharmacy Admin Tracking Only    PHSO: No  Total # of Interventions Recommended: 1  - Updated Order #: 0 Updated Order Reason(s):  Other  - Maintenance Safety Lab Monitoring #: 1    Total Interventions Accepted: 2  Time Spent (min): 81 [facility]

## 2020-12-15 ENCOUNTER — TELEPHONE (OUTPATIENT)
Dept: PHARMACY | Age: 65
End: 2020-12-15

## 2020-12-15 NOTE — TELEPHONE ENCOUNTER
COVID-19 phone screening     Call placed to screen patient prior to upcoming Medication Management visit for Anticoagulation on 12/16/20. Does patient have any of the following symptoms? [] Fever    [] Lower respiratory symptoms (SOB, difficulty breathing, cough)  [] None  Left message for patient    Travel Screening completed. Patient instructed to wear mask during visit.     MAMIE Akhtar.Ph., 12/15/2020,9:40 AM

## 2020-12-16 ENCOUNTER — HOSPITAL ENCOUNTER (OUTPATIENT)
Dept: PHARMACY | Age: 65
Setting detail: THERAPIES SERIES
Discharge: HOME | End: 2020-12-16
Payer: COMMERCIAL

## 2020-12-16 VITALS
DIASTOLIC BLOOD PRESSURE: 88 MMHG | WEIGHT: 115.2 LBS | SYSTOLIC BLOOD PRESSURE: 143 MMHG | HEART RATE: 81 BPM | TEMPERATURE: 97.9 F | BODY MASS INDEX: 19.77 KG/M2

## 2020-12-16 LAB — INR BLD: 2.9

## 2020-12-16 PROCEDURE — 85610 PROTHROMBIN TIME: CPT

## 2020-12-16 PROCEDURE — 99211 OFF/OP EST MAY X REQ PHY/QHP: CPT

## 2021-01-25 ENCOUNTER — TELEPHONE (OUTPATIENT)
Dept: PHARMACY | Age: 66
End: 2021-01-25

## 2021-01-25 NOTE — TELEPHONE ENCOUNTER
Recent Travel Screening and Travel History documentation:     Travel Screening     Question   Response    In the last month, have you been in contact with someone who was confirmed or suspected to have Coronavirus / COVID-19? Unable to assess    Have you had a COVID-19 viral test in the last 14 days? Unable to assess    Do you have any of the following new or worsening symptoms? Unable to assess    Have you traveled internationally in the last month?   Unable to assess      Travel History   Travel since 12/25/20     No documented travel since 12/25/20         Had to leave [facility]

## 2021-01-26 ENCOUNTER — HOSPITAL ENCOUNTER (OUTPATIENT)
Dept: PHARMACY | Age: 66
Setting detail: THERAPIES SERIES
Discharge: HOME | End: 2021-01-26
Payer: COMMERCIAL

## 2021-01-26 VITALS
DIASTOLIC BLOOD PRESSURE: 77 MMHG | WEIGHT: 115 LBS | SYSTOLIC BLOOD PRESSURE: 139 MMHG | HEART RATE: 80 BPM | BODY MASS INDEX: 19.74 KG/M2

## 2021-01-26 DIAGNOSIS — Z95.2 AORTIC VALVE REPLACED: ICD-10-CM

## 2021-01-26 DIAGNOSIS — I48.91 ATRIAL FIBRILLATION, UNSPECIFIED TYPE (HCC): ICD-10-CM

## 2021-01-26 DIAGNOSIS — Z95.2 S/P MVR (MITRAL VALVE REPLACEMENT): ICD-10-CM

## 2021-01-26 LAB — INR BLD: 3.1

## 2021-01-26 PROCEDURE — 99211 OFF/OP EST MAY X REQ PHY/QHP: CPT | Performed by: FAMILY MEDICINE

## 2021-01-26 PROCEDURE — 85610 PROTHROMBIN TIME: CPT | Performed by: FAMILY MEDICINE

## 2021-01-26 RX ORDER — OXYBUTYNIN CHLORIDE 10 MG/1
10 TABLET, EXTENDED RELEASE ORAL DAILY
COMMUNITY

## 2021-01-26 NOTE — PROGRESS NOTES
[address] [facility]/[place]  Medication Management  ANTICOAGULATION    Referring Doctor: Dr Trinidad Guaman INR: 2.5-3.5    TODAY'S INR: 3.1    WARFARIN Dosage: 2.5mg TRSat, 5mg all other days    INR (no units)   Date Value   01/26/2021 3.1   12/16/2020 2.9   11/18/2020 3.6   10/14/2020 2.7   09/21/2020 3   09/08/2020 3.7   08/31/2020 2.3       Medication changes:  Oxybutynin changed to 10mg XR daily  Notes:    Fingerstick INR drawn per clinic protocol. Patient states no visible blood in urine and no black tarry stool. Denies any missed doses of warfarin. No change in other maintenance medications or in diet. Will recheck INR in 5 weeks. Patient acknowledges working in consult agreement with pharmacist as referred by his/her physician. CLINICAL PHARMACY CONSULT: MED RECONCILIATION/REVIEW ADDENDUM    For Pharmacy Admin Tracking Only    PHSO: No  Total # of Interventions Recommended: 1  - Updated Order #: 1 Updated Order Reason(s):  Other  - Maintenance Safety Lab Monitoring #: 1    Total Interventions Accepted: 2  Time Spent (min): 81 [facility]

## 2021-02-04 DIAGNOSIS — Z79.01 CHRONIC ANTICOAGULATION: ICD-10-CM

## 2021-02-04 DIAGNOSIS — Z95.1 S/P CABG X 2: ICD-10-CM

## 2021-02-04 DIAGNOSIS — I10 ESSENTIAL HYPERTENSION: ICD-10-CM

## 2021-02-04 DIAGNOSIS — I25.10 ASHD (ARTERIOSCLEROTIC HEART DISEASE): ICD-10-CM

## 2021-02-04 RX ORDER — CARVEDILOL 6.25 MG/1
6.25 TABLET ORAL 2 TIMES DAILY
Qty: 180 TABLET | Refills: 3 | Status: SHIPPED | OUTPATIENT
Start: 2021-02-04 | End: 2021-04-05

## 2021-03-01 ENCOUNTER — TELEPHONE (OUTPATIENT)
Dept: PHARMACY | Age: 66
End: 2021-03-01

## 2021-03-01 NOTE — TELEPHONE ENCOUNTER
Recent Travel Screening and Travel History documentation:     Travel Screening     Question   Response    In the last month, have you been in contact with someone who was confirmed or suspected to have Coronavirus / COVID-19? Unable to assess    Have you had a COVID-19 viral test in the last 14 days? Unable to assess    Do you have any of the following new or worsening symptoms? Unable to assess    Have you traveled internationally in the last month?   Unable to assess      Travel History   Travel since 02/01/21     No documented travel since 02/01/21         VM

## 2021-03-02 ENCOUNTER — HOSPITAL ENCOUNTER (OUTPATIENT)
Dept: PHARMACY | Age: 66
Setting detail: THERAPIES SERIES
Discharge: HOME | End: 2021-03-02
Payer: COMMERCIAL

## 2021-03-02 DIAGNOSIS — Z95.2 AORTIC VALVE REPLACED: ICD-10-CM

## 2021-03-02 DIAGNOSIS — Z95.2 S/P MVR (MITRAL VALVE REPLACEMENT): ICD-10-CM

## 2021-03-02 LAB — INR BLD: 3.5

## 2021-03-02 PROCEDURE — 99211 OFF/OP EST MAY X REQ PHY/QHP: CPT

## 2021-03-02 PROCEDURE — 85610 PROTHROMBIN TIME: CPT

## 2021-03-02 NOTE — PATIENT INSTRUCTIONS
Continue to monitor urine and stool. Continue to monitor for signs of bleeding. Return to clinic in 6 weeks. Continue warfarin half tablet for 2.5 mg on Tue, Thur, Sat and whole 5 mg tablet all other days.

## 2021-03-02 NOTE — PROGRESS NOTES
[address] [facility]/[place]  Medication Management  ANTICOAGULATION    Referring Doctor: Marcus    GOAL INR: 2.5 - 3.5    TODAY'S INR: 3.5    WARFARIN Dosage: Continue warfarin half tablet for 2.5 mg on Tue, Thur, Sat and whole 5 mg tablet all other days. INR (no units)   Date Value   03/02/2021 3.5   01/26/2021 3.1   12/16/2020 2.9   11/18/2020 3.6   10/14/2020 2.7   09/21/2020 3   09/08/2020 3.7     Medication changes:  None    Notes:    Fingerstick INR drawn per clinic protocol. Patient states no visible blood in urine and no black tarry stool. Denies any missed doses of warfarin. No change in other maintenance medications or in diet. Will recheck INR in 6 weeks. Patient acknowledges working in consult agreement with pharmacist as referred by his/her physician. CLINICAL PHARMACY CONSULT: MED RECONCILIATION/REVIEW ADDENDUM    For Pharmacy Admin Tracking Only    PHSO: No  Total # of Interventions Recommended: 0    - Maintenance Safety Lab Monitoring #: 1  Recommended intervention potential cost savings:   Accepted intervention potential cost savings:    Total Interventions Accepted: 1  Time Spent (min): 15    Wilner Barrera PharmD

## 2021-04-05 ENCOUNTER — OFFICE VISIT (OUTPATIENT)
Dept: CARDIOLOGY | Age: 66
End: 2021-04-05
Payer: COMMERCIAL

## 2021-04-05 VITALS
HEART RATE: 78 BPM | DIASTOLIC BLOOD PRESSURE: 73 MMHG | RESPIRATION RATE: 18 BRPM | BODY MASS INDEX: 19.05 KG/M2 | SYSTOLIC BLOOD PRESSURE: 138 MMHG | OXYGEN SATURATION: 98 % | WEIGHT: 111.6 LBS | HEIGHT: 64 IN

## 2021-04-05 DIAGNOSIS — R00.2 PALPITATIONS: ICD-10-CM

## 2021-04-05 DIAGNOSIS — I10 ESSENTIAL HYPERTENSION: ICD-10-CM

## 2021-04-05 DIAGNOSIS — I48.0 PAF (PAROXYSMAL ATRIAL FIBRILLATION) (HCC): Primary | ICD-10-CM

## 2021-04-05 DIAGNOSIS — Z95.1 S/P CABG X 2: ICD-10-CM

## 2021-04-05 DIAGNOSIS — Z95.2 S/P AORTIC VALVE AND MITRAL VALVE REPLACEMENT: ICD-10-CM

## 2021-04-05 DIAGNOSIS — I25.10 ASHD (ARTERIOSCLEROTIC HEART DISEASE): ICD-10-CM

## 2021-04-05 PROCEDURE — 93000 ELECTROCARDIOGRAM COMPLETE: CPT | Performed by: INTERNAL MEDICINE

## 2021-04-05 PROCEDURE — 99214 OFFICE O/P EST MOD 30 MIN: CPT | Performed by: INTERNAL MEDICINE

## 2021-04-05 RX ORDER — METOPROLOL SUCCINATE 50 MG/1
50 TABLET, EXTENDED RELEASE ORAL DAILY
Qty: 90 TABLET | Refills: 3 | Status: SHIPPED | OUTPATIENT
Start: 2021-04-05 | End: 2022-03-28 | Stop reason: SDUPTHER

## 2021-04-05 NOTE — PROGRESS NOTES
Desean Calixto am scribing for and in the presence of Argentina Quach MD, F.A.C.C..    Patient: Malena Ellis  : 1955  Date of Visit: 2021    REASON FOR VISIT / CONSULTATION: 6 Month Follow-Up (HX:CAD s/p CABG x2, mitral valve replacement,AFib Pt is here for 6 month follow up she states she is tired all the time. Rare CP monthly stress related near rib area. SOB at times, lightheaded/dizziness, and palpitaitons )      History of Present Illness:        Dear Asiya Sales,    I had the pleasure of seeing Malena Ellis in my office today. Ms. Bhavesh Rm is a 63 y.o. female who presented for evaluation and establish care. She has extensive cardiac history. History of rheumatic fever diagnosed in . She had ischemic colitis back in , this was probably embolic from atrial fibrillation. History of open heart surgery with 2 vessel bypass (SVG to LAD and SVG to OM1), aortic valve replacement with 19 mm Saint Zhao mechanical valve, mitral valve replacement with 27 mm Saint Zhao mechanical valve and maze procedure on 2008. Aortic valve replacement with 21 Amy-Barnett aortic valve and aortic root replacement with bovine patch in addition to tricuspid valve repair in     She has been maintained on warfarin since her episode of ischemic colitis and Aortic/mitral valve replacement with no bleeding complications. She told me that she has never been taking statin therapy. I looked back in the note from Dr. Den Skelton, the only thing I saw was fish oil. Patient had CABGx2 at the time of her mitral and aortic valve replacement. She has history of hypertension. Denied history of diabetes or dyslipidemia. Other relevant medical history includes migraine headaches and anxiety. She said she stopped taking Paxil because it made her gaining weight. CAM monitor done on 2020-   1.  3 days and 1 hour recorded.   2.  Baseline rhythm is sinus with average heart rate of 71 bpm ranging between 42 and 132 bpm.  3.  Sinus tachycardia represented 3% of the study distribution. 4.  Occasional APCs, 3% with multiple runs of what appeared to be ectopic atrial tachycardia. The longest lasted for 2.5 hours at a heart rate of 174 bpm.  These episodes usually terminated into sinus pauses followed by junctional escape beats and sinus bradycardia. 5.  A total of 12 pauses more than 2.5 seconds, the longest being 3.0 seconds. Some of these were conversion pauses and happening during typical daytime hours. 6.  Occasional PVCs (3%) with 2 runs of wide QRS tachycardia. The longest was 5 beats at a heart rate of 134 bpm.  Cannot exclude supraventricular tachycardia with aberrant conduction. 7-we offered her an ablation procedure after getting the CAM monitor but patient refused to do this during the pandemic. Exercise Tolerance: Ms. Alexus Guzman reports that she has a fairly good exercise tolerance. Her says that she could walk 1 mile without developing chest discomfort or significant shortness of breath. Ms. Alexus Guzman is here today for six month follow up. She states her palpitations are worsening. She does have some lightheaded/dizziness that comes and goes (once or twice a day- the room spins). No presyncope or syncopal episodes. She also feels palpitations once or twice a day. She knows she is not drinking nearly enough fluids. She is fairly active she is doing yard work without any significant problems. She denies any chest pain, pressure or tightness. No significant shortness of breath. No orthopnea or PND. No fever or cough. No abdominal pain, nausea or vomiting. No problem moving her bowel. No joint or back pain. No bleeding complications from taking aspirin and warfarin. Weight is stable.     PAST MEDICAL HISTORY:         Past Medical History:   Diagnosis Date    Atrial fibrillation ([facility] [address]     CAD (coronary artery disease)     CHF (congestive heart failure) ([address]     Congest[facility] ([facility]F) [facility] [address]     Depression 2015    H/O mitral valve replacement     H/O tricuspid valve repair     Hypertension     Ischemic colitis ([facility] [address] ,     Migraine     Migraine     Migraine     PAF (paroxysmal atrial fibrillation) (HCC)     Rheumatic fever     S/P AVR     SOB (shortness of breath)        CURRENT ALLERGIES: Patient has no known allergies. REVIEW OF SYSTEMS: 14 systems were reviewed. Pertinent positives and negatives as above, all else negative.      Past Surgical History:   Procedure Laterality Date    AORTIC VALVE REPLACEMENT      CARDIAC CATHETERIZATION      CARDIAC VALVE REPLACEMENT      aortic and mitral    CHOLECYSTECTOMY      COLONOSCOPY      MITRAL VALVE REPLACEMENT      OVARIAN CYST REMOVAL Right 1977    PRE-MALIGNANT / BENIGN SKIN LESION EXCISION Left 13    face    TONSILLECTOMY AND ADENOIDECTOMY      TRANSESOPHAGEAL ECHOCARDIOGRAM  2017    TUBAL LIGATION      Social History:  Social History     Tobacco Use    Smoking status: Former Smoker     Packs/day: 0.50     Years: 20.00     Pack years: 10.00     Quit date: 3/28/1993     Years since quittin.0    Smokeless tobacco: Never Used   Substance Use Topics    Alcohol use: No    Drug use: No        CURRENT MEDICATIONS:        Outpatient Medications Marked as Taking for the 21 encounter (Office Visit) with Sebastián Rich MD   Medication Sig Dispense Refill    carvedilol (COREG) 6.25 MG tablet Take 1 tablet by mouth 2 times daily 180 tablet 3    oxybutynin (DITROPAN-XL) 10 MG extended release tablet Take 10 mg by mouth daily      atorvastatin (LIPITOR) 20 MG tablet Take 1 tablet by mouth once daily 90 tablet 3    warfarin (COUMADIN) 5 MG tablet Take 1 tablet by mouth See Admin Instructions [facility]:  2.5 mg every ;  5 mg all other days 90 tablet 3    magnesium oxide (MAG-OX) 400 MG tablet Take 400 mg by mouth daily      Biotin (BIOTIN 5000) 5 MG CAPS Take by mouth daily       Multiple Vitamins-Minerals (THERAPEUTIC MULTIVITAMIN-MINERALS) tablet Take 1 tablet by mouth daily      losartan (COZAAR) 25 MG tablet Take 1 tablet by mouth daily 90 tablet 3    omeprazole (PRILOSEC) 20 MG capsule Take 1 capsule by mouth Daily 30 capsule 3    aspirin 81 MG tablet Take 162 mg by mouth daily          FAMILY HISTORY: family history includes Cancer (age of onset: 40) in her mother; Cancer (age of onset: 61) in her sister; Diabetes in her father. Physical Examination:     /73 (Site: Left Upper Arm, Position: Sitting, Cuff Size: Medium Adult)   Pulse 78   Resp 18   Ht 5' 4\" (1.626 m)   Wt 111 lb 9.6 oz (50.6 kg)   SpO2 98%   BMI 19.16 kg/m²  Body mass index is 19.16 kg/m². Constitutional: She appeared oriented to person and place. She appears well-developed and well-nourished. In no acute distress. HEENT: Normocephalic and atraumatic. No JVD present. Carotid bruit is not present. No mass and no thyromegaly present. No lymphadenopathy noted. Cardiovascular: Normal rate, regular rhythm, mechanical S1. Loud S2. Short ejection systolic murmur heard at the second left intercostal space without radiation. Pulmonary/Chest: Effort normal and breath sounds normal. No respiratory distress. She has no wheezes, rhonchi or rales. Abdominal: Soft, non-tender. She exhibits no organomegaly, mass or bruit. Extremities: No edema. No cyanosis or clubbing. 2+ radial and carotid pulses. Distal extremity pulses: 2+ bilaterally. .  Neurological: Alertness and orientation as per Constitutional exam. No evidence of gross cranial nerve deficit. Coordination appeared normal.   Skin: Skin is warm and dry. There is no rash or diaphoresis. Psychiatric: She has a normal mood and affect.  Her speech is normal and behavior is normal.      MOST RECENT LABS ON RECORD:   Lab Results   Component Value Date    WBC 5.1 08/06/2020    HGB 13.2 08/06/2020    HCT 40.5 08/06/2020     (L) 08/06/2020    CHOL 249 (H) 08/06/2020    TRIG 122 08/06/2020    HDL 95 08/06/2020    ALT 17 02/12/2020    AST 26 02/12/2020     08/06/2020    K 4.5 08/06/2020     08/06/2020    CREATININE 0.79 08/06/2020    BUN 23 08/06/2020    CO2 27 08/06/2020    INR 3.5 03/02/2021       ASSESSMENT:     1. PAF (paroxysmal atrial fibrillation) ([facility] [address]    2. ASHD (arteriosclerotic heart disease)    3. S/P CABG x 2    4. Essential hypertension    5. S/P aortic valve and mitral valve replacement    6. Palpitations       PLAN:        Paroxysmal Atrial Fibrillation: Currently maintaining sinus rhythm S/P surgical maze procedure in 2008. Beta Blocker: Discontinue carvedilol and start Toprol-XL 50 mg daily for better control of the heart rate and palpitations. Patient is refusing to consider ablation. Currently maintaining sinus rhythm as per ECG today. Stroke Risk: CHADS2-VASc Score: greater than 2 (2.2% stroke risk)  [id number] Score for Atrial Fibrillation Stroke Risk   Risk   Factors  Component Value   C CHF No 0   H HTN Yes 1   A2 Age >= 76 No,  (66 y.o.) 0   D DM No 0   S2 Prior Stroke/TIA No 0   V Vascular Disease No 0   A Age 74-69 No,  (66 y.o.) 0   Sc Sex female 1    [id number]  Score  2   Score last updated 8/6/20 4:01 PM EDT    Click here for a link to the UpToDate guideline \"Atrial Fibrillation: Anticoagulation therapy to prevent embolization  Disclaimer: Risk Score calculation is dependent on accuracy of patient problem list and past encounter diagnosis. Anticoagulation: warfarin (Coumadin) take as directed (goal INR 2.5-3.5)  Additional Testing List: None     Atherosclerotic Heart Disease: S/P CABG x2 SVG to LAD and SVG to OM1 in 2008.  Antiplatelet Agent: Continue aspirin 162 mg   Beta Blocker: STOP Carvedilol (Coreg) and START Metoprolol succinate (Toprol XL) 50 mg daily.  I also discussed the potential side effects of this medication including 2.5-3.5. · Currently compensated. No evidence of volume overload. · Continue Toprol XL and losartan as outlined above. · Follow-up repeat echo.  Paroxysmal Recurrent intermittent palpitations: Rate Control Symptomatic  · Beta Blocker: STOP Carvedilol (Coreg) and START Metoprolol succinate (Toprol XL) 50 mg daily. I also discussed the potential side effects of this medication including lightheadedness and dizziness and instructed them to stop the medication of this occurs and call our office if this occurs. · Counseled regarding adequate hydration. · Hyperlipidemia: Mixed  · Cholesterol Reduction Therapy: Continue Atorvastatin (Lipitor) 20 mg daily. I discussed the potential benefits of statin therapy as well as the potential risks including myalgia as well as the rare but potentially serious complication of liver or kidney damage. Although rare, I told them that this could be serious and therefore told them to stop the medication immediately and call if they developed any severe muscle aches or pains and they agreed to do so. ·  as of 8/6/2020. · I ordered repeat Lipid panel. Finally, I recommended that she continue her current medications and follow up with you as previously scheduled. FOLLOW UP:   I told Ms. Jerry Jean to call my office if she had any problems, but otherwise I asked her to Return in about 6 months (around 10/5/2021). However, I would be happy to see her sooner should the need arise. Sincerely,  Jose Martin Rome MD, F.A.C.C. [facility] Cardiology Specialist    [address]  Phone: [phone], Fax: [phone]     I believe that the risk of significant morbidity and mortality related to the patient's current medical conditions are: intermediate-high. >30 minutes were spent during prep work, discussion and exam of the patient, and follow up documentation and all of their questions were answered.     The documentation recorded by the

## 2021-04-05 NOTE — PATIENT INSTRUCTIONS
SURVEY:    You may be receiving a survey from [employer] regarding your visit today. Please complete the survey to enable us to provide the highest quality of care to you and your family. If you cannot score us a very good on any question, please call the office to discuss how we could have made your experience a very good one. Thank you.

## 2021-04-12 ENCOUNTER — TELEPHONE (OUTPATIENT)
Dept: PHARMACY | Age: 66
End: 2021-04-12

## 2021-04-12 NOTE — TELEPHONE ENCOUNTER
Recent Travel Screening and Travel History documentation:     Travel Screening     Question   Response    In the last month, have you been in contact with someone who was confirmed or suspected to have Coronavirus / COVID-19? Unable to assess    Have you had a COVID-19 viral test in the last 14 days? Unable to assess    Do you have any of the following new or worsening symptoms? Unable to assess    Have you traveled internationally or domestically in the last month? Unable to assess      Travel History   Travel since 03/12/21     No documented travel since 03/12/21         Had to leave [facility].     Nikia Rogers, PharmD 4/12/2021 8:57 AM

## 2021-04-13 ENCOUNTER — HOSPITAL ENCOUNTER (OUTPATIENT)
Dept: PHARMACY | Age: 66
Setting detail: THERAPIES SERIES
Discharge: HOME | End: 2021-04-13
Payer: COMMERCIAL

## 2021-04-13 VITALS
TEMPERATURE: 96.8 F | WEIGHT: 113 LBS | BODY MASS INDEX: 19.4 KG/M2 | HEART RATE: 65 BPM | DIASTOLIC BLOOD PRESSURE: 98 MMHG | SYSTOLIC BLOOD PRESSURE: 136 MMHG

## 2021-04-13 DIAGNOSIS — Z95.2 AORTIC VALVE REPLACED: ICD-10-CM

## 2021-04-13 DIAGNOSIS — Z95.2 S/P MVR (MITRAL VALVE REPLACEMENT): ICD-10-CM

## 2021-04-13 LAB — INR BLD: 2.9

## 2021-04-13 PROCEDURE — 99211 OFF/OP EST MAY X REQ PHY/QHP: CPT

## 2021-04-13 PROCEDURE — 85610 PROTHROMBIN TIME: CPT

## 2021-04-13 RX ORDER — ACETAMINOPHEN, ASPIRIN AND CAFFEINE 250; 250; 65 MG/1; MG/1; MG/1
1 TABLET, FILM COATED ORAL EVERY 6 HOURS PRN
COMMUNITY

## 2021-04-13 NOTE — PROGRESS NOTES
[address] [facility]/[place]  Medication Management  ANTICOAGULATION    Referring Doctor: Marcus    GOAL INR: 2.5 - 3.5    TODAY'S INR: 2.9    WARFARIN Dosage: Continue warfarin half tablet for 2.5 mg on Tue, Thur, Sat and whole 5 mg tablet all other days. INR (no units)   Date Value   03/02/2021 3.5   01/26/2021 3.1   12/16/2020 2.9   11/18/2020 3.6   10/14/2020 2.7   09/21/2020 3   09/08/2020 3.7     Medication changes:  Stopped Coreg   Started Toprol 50 mg daily    Notes:    Fingerstick INR drawn per clinic protocol. Patient states no visible blood in urine and no black tarry stool. Denies any missed doses of warfarin. No change in other maintenance medications or in diet. Will recheck INR in 6 weeks. Patient acknowledges working in consult agreement with pharmacist as referred by his/her physician.                   CLINICAL PHARMACY CONSULT: MED RECONCILIATION/REVIEW ADDENDUM    For Pharmacy Admin Tracking Only    PHSO: No  Total # of Interventions Recommended: 2  - Discontinued Medication #: 1 Discontinue Reason(s): No Longer Used  - New Order #: 1 New Medication Order Reason(s): Needs Additional Medication Therapy  - Maintenance Safety Lab Monitoring #: 1  Total Interventions Accepted: 3  Time Spent (min): 30    Don Voss, [place]

## 2021-04-16 ENCOUNTER — HOSPITAL ENCOUNTER (OUTPATIENT)
Dept: NON INVASIVE DIAGNOSTICS | Age: 66
Discharge: HOME | End: 2021-04-16
Payer: COMMERCIAL

## 2021-04-16 ENCOUNTER — HOSPITAL ENCOUNTER (OUTPATIENT)
Age: 66
Discharge: HOME | End: 2021-04-16
Payer: COMMERCIAL

## 2021-04-16 DIAGNOSIS — I48.0 PAF (PAROXYSMAL ATRIAL FIBRILLATION) (HCC): ICD-10-CM

## 2021-04-16 DIAGNOSIS — Z95.2 S/P AORTIC VALVE AND MITRAL VALVE REPLACEMENT: ICD-10-CM

## 2021-04-16 DIAGNOSIS — Z95.1 S/P CABG X 2: ICD-10-CM

## 2021-04-16 DIAGNOSIS — I25.10 ASHD (ARTERIOSCLEROTIC HEART DISEASE): ICD-10-CM

## 2021-04-16 DIAGNOSIS — I10 ESSENTIAL HYPERTENSION: ICD-10-CM

## 2021-04-16 LAB
ANION GAP SERPL CALCULATED.3IONS-SCNC: 10 MMOL/L (ref 9–17)
BUN BLDV-MCNC: 24 MG/DL (ref 8–23)
BUN/CREAT BLD: 28 (ref 9–20)
CALCIUM SERPL-MCNC: 10 MG/DL (ref 8.6–10.4)
CHLORIDE BLD-SCNC: 105 MMOL/L (ref 98–107)
CHOLESTEROL/HDL RATIO: 1.8
CHOLESTEROL: 167 MG/DL
CO2: 26 MMOL/L (ref 20–31)
CREAT SERPL-MCNC: 0.85 MG/DL (ref 0.5–0.9)
GFR AFRICAN AMERICAN: >60 ML/MIN
GFR NON-AFRICAN AMERICAN: >60 ML/MIN
GFR SERPL CREATININE-BSD FRML MDRD: ABNORMAL ML/MIN/{1.73_M2}
GFR SERPL CREATININE-BSD FRML MDRD: ABNORMAL ML/MIN/{1.73_M2}
GLUCOSE BLD-MCNC: 105 MG/DL (ref 70–99)
HCT VFR BLD CALC: 41.9 % (ref 36.3–47.1)
HDLC SERPL-MCNC: 92 MG/DL
HEMOGLOBIN: 13.7 G/DL (ref 11.9–15.1)
LDL CHOLESTEROL: 57 MG/DL (ref 0–130)
LV EF: 60 %
LVEF MODALITY: NORMAL
MCH RBC QN AUTO: 31.6 PG (ref 25.2–33.5)
MCHC RBC AUTO-ENTMCNC: 32.7 G/DL (ref 28.4–34.8)
MCV RBC AUTO: 96.5 FL (ref 82.6–102.9)
NRBC AUTOMATED: 0 PER 100 WBC
PDW BLD-RTO: 12.2 % (ref 11.8–14.4)
PLATELET # BLD: NORMAL K/UL (ref 138–453)
PLATELET, FLUORESCENCE: 125 K/UL (ref 138–453)
PLATELET, IMMATURE FRACTION: 3 % (ref 1.1–10.3)
PMV BLD AUTO: NORMAL FL (ref 8.1–13.5)
POTASSIUM SERPL-SCNC: 4.9 MMOL/L (ref 3.7–5.3)
RBC # BLD: 4.34 M/UL (ref 3.95–5.11)
SODIUM BLD-SCNC: 141 MMOL/L (ref 135–144)
TRIGL SERPL-MCNC: 88 MG/DL
VLDLC SERPL CALC-MCNC: NORMAL MG/DL (ref 1–30)
WBC # BLD: 5.9 K/UL (ref 3.5–11.3)

## 2021-04-16 PROCEDURE — 93306 TTE W/DOPPLER COMPLETE: CPT

## 2021-04-16 PROCEDURE — 85055 RETICULATED PLATELET ASSAY: CPT

## 2021-04-16 PROCEDURE — 80061 LIPID PANEL: CPT

## 2021-04-16 PROCEDURE — 36415 COLL VENOUS BLD VENIPUNCTURE: CPT

## 2021-04-16 PROCEDURE — 80048 BASIC METABOLIC PNL TOTAL CA: CPT

## 2021-04-16 PROCEDURE — 85027 COMPLETE CBC AUTOMATED: CPT

## 2021-04-19 ENCOUNTER — TELEPHONE (OUTPATIENT)
Dept: CARDIOLOGY | Age: 66
End: 2021-04-19

## 2021-04-19 NOTE — TELEPHONE ENCOUNTER
----- Message from Pacheco Callejas MD sent at 4/18/2021 11:16 AM EDT -----  Echo is good, Heart function is normal. Valves are ok.  Thank you

## 2021-05-24 ENCOUNTER — TELEPHONE (OUTPATIENT)
Dept: PHARMACY | Age: 66
End: 2021-05-24

## 2021-05-24 NOTE — TELEPHONE ENCOUNTER
COVID-19 phone screening     Call placed to screen patient prior to upcoming Medication Management visit for Anticoagulation on 5/25/21. Does patient have any of the following symptoms? [] Fever    [] Lower respiratory symptoms (SOB, difficulty breathing, cough)  [x] None    Travel Screening completed.    Mio Tavera R.Ph., 5/24/2021,12:07 PM

## 2021-05-25 ENCOUNTER — HOSPITAL ENCOUNTER (OUTPATIENT)
Dept: PHARMACY | Age: 66
Setting detail: THERAPIES SERIES
Discharge: HOME | End: 2021-05-25
Payer: COMMERCIAL

## 2021-05-25 VITALS
WEIGHT: 111.6 LBS | BODY MASS INDEX: 19.16 KG/M2 | HEART RATE: 66 BPM | DIASTOLIC BLOOD PRESSURE: 89 MMHG | SYSTOLIC BLOOD PRESSURE: 145 MMHG

## 2021-05-25 DIAGNOSIS — Z95.2 AORTIC VALVE REPLACED: ICD-10-CM

## 2021-05-25 DIAGNOSIS — I48.91 ATRIAL FIBRILLATION, UNSPECIFIED TYPE (HCC): ICD-10-CM

## 2021-05-25 DIAGNOSIS — Z95.2 S/P MVR (MITRAL VALVE REPLACEMENT): Primary | ICD-10-CM

## 2021-05-25 LAB — INR BLD: 5.1

## 2021-05-25 PROCEDURE — 85610 PROTHROMBIN TIME: CPT | Performed by: FAMILY MEDICINE

## 2021-05-25 PROCEDURE — 99212 OFFICE O/P EST SF 10 MIN: CPT | Performed by: FAMILY MEDICINE

## 2021-05-25 RX ORDER — GLUCOSAMINE/D3/BOSWELLIA SERRA 1500MG-400
1 TABLET ORAL DAILY
COMMUNITY

## 2021-05-25 NOTE — PROGRESS NOTES
[address] [facility]/[place]  Medication Management  ANTICOAGULATION    Referring Provider: Dr Juanito Phillip INR: 2.5-3.5    TODAY'S INR: 5.1    WARFARIN Dosage: hold x2 then resume 2.5mg TRSat, 5mg other    INR (no units)   Date Value   05/25/2021 5.1   04/13/2021 2.9   03/02/2021 3.5   01/26/2021 3.1   12/16/2020 2.9   11/18/2020 3.6   10/14/2020 2.7       Medication changes:  none  Notes:    Fingerstick INR drawn per clinic protocol. Patient states no visible blood in urine and no black tarry stool. Denies any missed doses of warfarin. No change in other maintenance medications or in diet. Will recheck INR in 1 week. Patient states she has been outside working in the heat for several days. Patient states the heat usually effects her INR and \"my blood gets thin\". Patient denies any changes in diet or routine. Patient to hold warfarin today and tomorrow then resume previously stable dosing. Patient Patient acknowledges working in consult agreement with pharmacist as referred by his/her physician.                   CLINICAL PHARMACY CONSULT: MED RECONCILIATION/REVIEW ADDENDUM    For Pharmacy Ren Diane Tracking Only     Intervention Detail: Dose Adjustment: 2: reason: Therapy Optimization   Total # of Interventions Recommended: 3   Total # of Interventions Accepted: 3   Time Spent (min): Pj Christy R.Ph., 5/25/2021,1:54 PM

## 2021-05-29 ENCOUNTER — TELEPHONE (OUTPATIENT)
Dept: PHARMACY | Age: 66
End: 2021-05-29

## 2021-05-29 NOTE — TELEPHONE ENCOUNTER
COVID-19 phone screening     Call placed to screen patient prior to upcoming Medication Management visit for Anticoagulation on 6/1/21. Does patient have any of the following symptoms? [] Fever    [] Lower respiratory symptoms (SOB, difficulty breathing, cough)  [x] None    Travel Screening completed.

## 2021-06-01 ENCOUNTER — HOSPITAL ENCOUNTER (OUTPATIENT)
Dept: PHARMACY | Age: 66
Setting detail: THERAPIES SERIES
Discharge: HOME | End: 2021-06-01
Payer: COMMERCIAL

## 2021-06-01 VITALS
BODY MASS INDEX: 18.88 KG/M2 | WEIGHT: 110 LBS | HEART RATE: 63 BPM | SYSTOLIC BLOOD PRESSURE: 148 MMHG | DIASTOLIC BLOOD PRESSURE: 85 MMHG

## 2021-06-01 DIAGNOSIS — Z95.2 S/P MVR (MITRAL VALVE REPLACEMENT): Primary | ICD-10-CM

## 2021-06-01 DIAGNOSIS — Z95.2 AORTIC VALVE REPLACED: ICD-10-CM

## 2021-06-01 LAB — INR BLD: 3.1

## 2021-06-01 PROCEDURE — 85610 PROTHROMBIN TIME: CPT

## 2021-06-01 PROCEDURE — 99211 OFF/OP EST MAY X REQ PHY/QHP: CPT

## 2021-06-01 NOTE — PROGRESS NOTES
[address] [facility]/[place]  Medication Management  ANTICOAGULATION    Referring Provider: Tiffany Gardner INR: 2.5-3.5    TODAY'S INR: 3.1    WARFARIN Dosage: Patient will continue warfarin 2.5 mg every Tuesday, Thursday, Saturday; 5 mg all other days    INR (no units)   Date Value   06/01/2021 3.1   05/25/2021 5.1   04/13/2021 2.9   03/02/2021 3.5   01/26/2021 3.1   12/16/2020 2.9   11/18/2020 3.6       Notes:    Fingerstick INR drawn per clinic protocol. Patient states no visible blood in urine and no black tarry stool. Denies any missed doses of warfarin. No change in other maintenance medications or in diet. Will recheck INR in 6 weeks. Patient acknowledges working in consult agreement with pharmacist as referred by his/her physician. CLINICAL PHARMACY CONSULT: MED RECONCILIATION/REVIEW ADDENDUM    For Pharmacy Admin Tracking Only     Total # of Interventions Recommended: 0   Total # of Interventions Accepted: 0   Time Spent (min): Xochitl.  Tamar Zarco, [address]

## 2021-06-01 NOTE — PATIENT INSTRUCTIONS
Continue current dose of warfarin as instructed on dosing calendar provided - 2.5 mg every Tuesday, Thursday, Saturday and 5 mg all other days. Return to clinic in 6 weeks. Continue to monitor urine and stool for signs and symptoms of bleeding. Please notify the clinic of any medication changes.

## 2021-07-13 ENCOUNTER — HOSPITAL ENCOUNTER (OUTPATIENT)
Dept: PHARMACY | Age: 66
Setting detail: THERAPIES SERIES
Discharge: HOME | End: 2021-07-13
Payer: COMMERCIAL

## 2021-07-13 VITALS
HEART RATE: 67 BPM | SYSTOLIC BLOOD PRESSURE: 156 MMHG | DIASTOLIC BLOOD PRESSURE: 81 MMHG | BODY MASS INDEX: 19.4 KG/M2 | WEIGHT: 113 LBS

## 2021-07-13 DIAGNOSIS — Z95.2 AORTIC VALVE REPLACED: ICD-10-CM

## 2021-07-13 DIAGNOSIS — Z95.2 S/P MVR (MITRAL VALVE REPLACEMENT): Primary | ICD-10-CM

## 2021-07-13 LAB — INR BLD: 2.8

## 2021-07-13 PROCEDURE — 99211 OFF/OP EST MAY X REQ PHY/QHP: CPT

## 2021-07-13 PROCEDURE — 85610 PROTHROMBIN TIME: CPT

## 2021-07-13 NOTE — PATIENT INSTRUCTIONS
Continue current dose of warfarin as instructed on dosing calendar provided - 2.5 mg every Tuesday, Thursday and Saturday and 5 mg all other days. Return to clinic in 6 weeks. Continue to monitor urine and stool for signs and symptoms of bleeding. Please notify the clinic of any medication changes.

## 2021-07-13 NOTE — PROGRESS NOTES
[facility] [address] [place] [address]/[place]  Medication Management  ANTICOAGULATION    Referring Provider: Yany Valladares INR: 2.5-3.5    TODAY'S INR: 2.8    WARFARIN Dosage: Patient will continue warfarin 2.5 mg every Tuesday, Thursday, Saturday; 5 mg all other days    INR (no units)   Date Value   07/13/2021 2.8   06/01/2021 3.1   05/25/2021 5.1   04/13/2021 2.9   03/02/2021 3.5   01/26/2021 3.1   12/16/2020 2.9         Notes:    Fingerstick INR drawn per clinic protocol. Patient states no visible blood in urine and no black tarry stool. Denies any missed doses of warfarin. No change in other maintenance medications or in diet. Will recheck INR in 6 weeks. Patient acknowledges working in consult agreement with pharmacist as referred by his/her physician. For Pharmacy Admin Tracking Only     Total # of Interventions Recommended: 0   Total # of Interventions Accepted: 0   Time Spent (min): 8[address]  Analilia Viera, [address] [facility]

## 2021-07-30 DIAGNOSIS — I10 ESSENTIAL HYPERTENSION: ICD-10-CM

## 2021-07-30 DIAGNOSIS — I25.10 ASHD (ARTERIOSCLEROTIC HEART DISEASE): ICD-10-CM

## 2021-07-30 DIAGNOSIS — Z79.01 CHRONIC ANTICOAGULATION: ICD-10-CM

## 2021-07-30 DIAGNOSIS — Z95.1 S/P CABG X 2: ICD-10-CM

## 2021-07-30 RX ORDER — LOSARTAN POTASSIUM 25 MG/1
25 TABLET ORAL DAILY
Qty: 90 TABLET | Refills: 3 | Status: SHIPPED | OUTPATIENT
Start: 2021-07-30 | End: 2022-04-18 | Stop reason: SDUPTHER

## 2021-08-04 ENCOUNTER — ANTI-COAG VISIT (OUTPATIENT)
Dept: PHARMACY | Age: 66
End: 2021-08-04

## 2021-08-04 DIAGNOSIS — Z95.2 AORTIC VALVE REPLACED: ICD-10-CM

## 2021-08-04 DIAGNOSIS — Z95.2 S/P MVR (MITRAL VALVE REPLACEMENT): Primary | ICD-10-CM

## 2021-08-23 ENCOUNTER — TELEPHONE (OUTPATIENT)
Dept: PHARMACY | Age: 66
End: 2021-08-23

## 2021-08-23 NOTE — TELEPHONE ENCOUNTER
Recent Travel Screening and Travel History documentation:     Travel Screening     Question   Response    In the last month, have you been in contact with someone who was confirmed or suspected to have Coronavirus / COVID-19? Unable to assess    Have you had a COVID-19 viral test in the last 14 days? Unable to assess    Do you have any of the following new or worsening symptoms? Unable to assess    Have you traveled internationally or domestically in the last month? Unable to assess      Travel History   Travel since 07/23/21     No documented travel since 07/23/21         Had to leave [facility].     Srikanth Escalona, PharmD 8/23/2021 11:24 AM

## 2021-08-24 ENCOUNTER — HOSPITAL ENCOUNTER (OUTPATIENT)
Dept: PHARMACY | Age: 66
Setting detail: THERAPIES SERIES
Discharge: HOME | End: 2021-08-24
Payer: COMMERCIAL

## 2021-08-24 VITALS
SYSTOLIC BLOOD PRESSURE: 132 MMHG | BODY MASS INDEX: 19.53 KG/M2 | DIASTOLIC BLOOD PRESSURE: 79 MMHG | WEIGHT: 113.8 LBS | HEART RATE: 67 BPM

## 2021-08-24 DIAGNOSIS — Z95.2 AORTIC VALVE REPLACED: ICD-10-CM

## 2021-08-24 DIAGNOSIS — I48.91 ATRIAL FIBRILLATION, UNSPECIFIED TYPE (HCC): ICD-10-CM

## 2021-08-24 DIAGNOSIS — Z95.2 S/P MVR (MITRAL VALVE REPLACEMENT): Primary | ICD-10-CM

## 2021-08-24 LAB — INR BLD: 3.2

## 2021-08-24 PROCEDURE — 85610 PROTHROMBIN TIME: CPT | Performed by: FAMILY MEDICINE

## 2021-08-24 PROCEDURE — 99211 OFF/OP EST MAY X REQ PHY/QHP: CPT | Performed by: FAMILY MEDICINE

## 2021-08-24 NOTE — PROGRESS NOTES
[address]/[place]  Medication Management  ANTICOAGULATION    Referring Provider: Dr Ghulam Higgins INR: 2.5-3.5    TODAY'S INR: 3.2    WARFARIN Dosage: continue 2.5mg TRSat, 5mg all other days    INR (no units)   Date Value   2021 3.2   2021 2.8   2021 3.1   2021 5.1   2021 2.9   2021 3.5   2021 3.1       Medication changes:  No changes  Notes:    Fingerstick INR drawn per clinic protocol. Patient states no visible blood in urine and no black tarry stool. Denies any missed doses of warfarin. No change in other maintenance medications or in diet. Will recheck INR in 7 weeks prior to appt with Dr Monik Crespo. Patient acknowledges working in consult agreement with pharmacist as referred by his/her physician.                   For Pharmacy Admin Tracking Only     Intervention Detail: Adherence Monitorin   Total # of Interventions Recommended: 2   Total # of Interventions Accepted: 2   Time Spent (min): 20        Gerhardt Jasper, R.Ph., 2021,11:14 AM

## 2021-09-27 DIAGNOSIS — Z79.01 CHRONIC ANTICOAGULATION: ICD-10-CM

## 2021-09-27 DIAGNOSIS — Z86.79 HISTORY OF ATRIAL FIBRILLATION: ICD-10-CM

## 2021-09-27 RX ORDER — WARFARIN SODIUM 5 MG/1
5 TABLET ORAL SEE ADMIN INSTRUCTIONS
Qty: 90 TABLET | Refills: 3 | Status: SHIPPED | OUTPATIENT
Start: 2021-09-27

## 2021-10-11 ENCOUNTER — TELEPHONE (OUTPATIENT)
Dept: PHARMACY | Age: 66
End: 2021-10-11

## 2021-10-11 NOTE — TELEPHONE ENCOUNTER
Recent Travel Screening and Travel History documentation:     Travel Screening     Question   Response    In the last month, have you been in contact with someone who was confirmed or suspected to have Coronavirus / COVID-19? Unable to assess    Have you had a COVID-19 viral test in the last 14 days? Unable to assess    Do you have any of the following new or worsening symptoms? Unable to assess    Have you traveled internationally or domestically in the last month? Unable to assess      Travel History   Travel since 09/11/21     No documented travel since 09/11/21         Had to leave [facility].     Nessa Rush, PharmD 10/11/2021 3:27 PM

## 2021-10-12 ENCOUNTER — HOSPITAL ENCOUNTER (OUTPATIENT)
Dept: PHARMACY | Age: 66
Setting detail: THERAPIES SERIES
Discharge: HOME | End: 2021-10-12
Payer: COMMERCIAL

## 2021-10-12 ENCOUNTER — HOSPITAL ENCOUNTER (OUTPATIENT)
Dept: NON INVASIVE DIAGNOSTICS | Age: 66
Discharge: HOME | End: 2021-10-12
Payer: COMMERCIAL

## 2021-10-12 ENCOUNTER — OFFICE VISIT (OUTPATIENT)
Dept: CARDIOLOGY | Age: 66
End: 2021-10-12
Payer: COMMERCIAL

## 2021-10-12 VITALS
HEART RATE: 74 BPM | DIASTOLIC BLOOD PRESSURE: 84 MMHG | WEIGHT: 113 LBS | BODY MASS INDEX: 19.4 KG/M2 | SYSTOLIC BLOOD PRESSURE: 153 MMHG

## 2021-10-12 VITALS
RESPIRATION RATE: 18 BRPM | HEIGHT: 64 IN | HEART RATE: 68 BPM | DIASTOLIC BLOOD PRESSURE: 72 MMHG | BODY MASS INDEX: 19.33 KG/M2 | WEIGHT: 113.2 LBS | OXYGEN SATURATION: 99 % | SYSTOLIC BLOOD PRESSURE: 133 MMHG

## 2021-10-12 DIAGNOSIS — E78.5 DYSLIPIDEMIA: ICD-10-CM

## 2021-10-12 DIAGNOSIS — I25.10 ASHD (ARTERIOSCLEROTIC HEART DISEASE): ICD-10-CM

## 2021-10-12 DIAGNOSIS — Z95.2 S/P AORTIC VALVE AND MITRAL VALVE REPLACEMENT: ICD-10-CM

## 2021-10-12 DIAGNOSIS — I48.0 PAF (PAROXYSMAL ATRIAL FIBRILLATION) (HCC): ICD-10-CM

## 2021-10-12 DIAGNOSIS — Z95.1 S/P CABG X 2: ICD-10-CM

## 2021-10-12 DIAGNOSIS — Z95.2 S/P MVR (MITRAL VALVE REPLACEMENT): Primary | ICD-10-CM

## 2021-10-12 DIAGNOSIS — Z95.2 AORTIC VALVE REPLACED: ICD-10-CM

## 2021-10-12 DIAGNOSIS — I10 ESSENTIAL HYPERTENSION: ICD-10-CM

## 2021-10-12 DIAGNOSIS — I48.0 PAF (PAROXYSMAL ATRIAL FIBRILLATION) (HCC): Primary | ICD-10-CM

## 2021-10-12 LAB — INR BLD: 3.4

## 2021-10-12 PROCEDURE — 85610 PROTHROMBIN TIME: CPT

## 2021-10-12 PROCEDURE — 93242 EXT ECG>48HR<7D RECORDING: CPT

## 2021-10-12 PROCEDURE — 99211 OFF/OP EST MAY X REQ PHY/QHP: CPT

## 2021-10-12 PROCEDURE — 99214 OFFICE O/P EST MOD 30 MIN: CPT | Performed by: INTERNAL MEDICINE

## 2021-10-12 PROCEDURE — 93243 EXT ECG>48HR<7D SCAN A/R: CPT

## 2021-10-12 NOTE — PROGRESS NOTES
Randa Gandhi am scribing for and in the presence of Becky Sandoval MD, F.A.C.C..    Patient: Ema Larsen  : 1955  Date of Visit: 2021    REASON FOR VISIT / CONSULTATION: Follow-up (HX: PAF, ASHD< HTn, mitral and aortic replace, palps. Pt is doing okay. ran out of breath pushing the cart at [facility] after abotu 3 aisles. sometimes dizzy, no falls or near falls. palps Denies: CP, )      History of Present Illness:        Dear Pranav Scale,    I had the pleasure of seeing Ema Larsen in my office today. Ms. Hguh Perez is a 61 y.o. female who presented for evaluation and establish care. She has extensive cardiac history. History of rheumatic fever diagnosed in . She had ischemic colitis back in , this was probably embolic from atrial fibrillation. History of open heart surgery with 2 vessel bypass (SVG to LAD and SVG to OM1), aortic valve replacement with 19 mm Saint Zhao mechanical valve, mitral valve replacement with 27 mm Saint Zhao mechanical valve and maze procedure on 2008. Aortic valve replacement with 21 Amy-Barnett aortic valve and aortic root replacement with bovine patch in addition to tricuspid valve repair in     She has been maintained on warfarin since her episode of ischemic colitis and Aortic/mitral valve replacement with no bleeding complications. She told me that she has never been taking statin therapy. I looked back in the note from Dr. David Rondon, the only thing I saw was fish oil. Patient had CABGx2 at the time of her mitral and aortic valve replacement. She has history of hypertension. Denied history of diabetes or dyslipidemia. Other relevant medical history includes migraine headaches and anxiety. She said she stopped taking Paxil because it made her gaining weight. CAM monitor done on 2020-   1.  3 days and 1 hour recorded.   2.  Baseline rhythm is sinus with average heart rate of 71 bpm ranging between 42 and 132 bpm.  3.  Sinus tachycardia represented 3% of the study distribution. 4.  Occasional APCs, 3% with multiple runs of what appeared to be ectopic atrial tachycardia. The longest lasted for 2.5 hours at a heart rate of 174 bpm.  These episodes usually terminated into sinus pauses followed by junctional escape beats and sinus bradycardia. 5.  A total of 12 pauses more than 2.5 seconds, the longest being 3.0 seconds. Some of these were conversion pauses and happening during typical daytime hours. 6.  Occasional PVCs (3%) with 2 runs of wide QRS tachycardia. The longest was 5 beats at a heart rate of 134 bpm.  Cannot exclude supraventricular tachycardia with aberrant conduction. 7-we offered her an ablation procedure after getting the CAM monitor but patient refused to do this during the pandemic. Exercise Tolerance: Ms. Jayne Pickett reports that she has a fairly good exercise tolerance. Her says that she could walk 1 mile without developing chest discomfort or significant shortness of breath. Ms. Jayne Pickett is here today for six month follow up. She said she did have shortness of breath down pushing a full cart down three aisles at the store today. She said she does have chest tightness in the center of her chest.  This has not changed from before. She says she does have palpitations a lot. No presyncope or syncopal episodes. She denies any chest pain, pressure. No significant shortness of breath. No orthopnea or PND. No fever or cough. No abdominal pain, nausea or vomiting. No problem moving her bowel. No joint or back pain. No bleeding complications from taking aspirin and warfarin. Weight is stable. No change in her appetite. She sleeps well at night, she only wakes up once maybe twice using the restroom. Kate Francis      PAST MEDICAL HISTORY:         Past Medical History:   Diagnosis Date    Atrial fibrillation ([address]     CAD (coronary artery disease)     CHF (congestive heart failure) ([address]     Congest[facility] ([facility]F) [facility]     Depression 2015    H/O mitral valve replacement     H/O tricuspid valve repair     Hypertension     Ischemic colitis ([facility] ,     Migraine     Migraine     Migraine     PAF (paroxysmal atrial fibrillation) (HCC)     Rheumatic fever     S/P AVR     SOB (shortness of breath)        CURRENT ALLERGIES: Patient has no known allergies. REVIEW OF SYSTEMS: 14 systems were reviewed. Pertinent positives and negatives as above, all else negative.      Past Surgical History:   Procedure Laterality Date    AORTIC VALVE REPLACEMENT      CARDIAC CATHETERIZATION      CARDIAC VALVE REPLACEMENT      aortic and mitral    CHOLECYSTECTOMY      COLONOSCOPY      MITRAL VALVE REPLACEMENT      OVARIAN CYST REMOVAL Right 1977    PRE-MALIGNANT / BENIGN SKIN LESION EXCISION Left 13    face    TONSILLECTOMY AND ADENOIDECTOMY      TRANSESOPHAGEAL ECHOCARDIOGRAM  2017    TUBAL LIGATION      Social History:  Social History     Tobacco Use    Smoking status: Former Smoker     Packs/day: 0.50     Years: 20.00     Pack years: 10.00     Quit date: 3/28/1993     Years since quittin.5    Smokeless tobacco: Never Used   Substance Use Topics    Alcohol use: No    Drug use: No        CURRENT MEDICATIONS:        Outpatient Medications Marked as Taking for the 10/12/21 encounter (Office Visit) with Hany Barber MD   Medication Sig Dispense Refill    warfarin (COUMADIN) 5 MG tablet Take 1 tablet by mouth See Admin Instructions Coumadin Clinic:  2.5 mg every ;  5 mg all other days 90 tablet 3    losartan (COZAAR) 25 MG tablet Take 1 tablet by mouth daily 90 tablet 3    Biotin 13831 MCG TABS Take 1 tablet by mouth daily      aspirin-acetaminophen-caffeine (EXCEDRIN MIGRAINE) 250-250-65 MG per tablet Take 1 tablet by mouth every 6 hours as needed for Headaches      metoprolol succinate (TOPROL XL) 50 MG extended release tablet Take 1 tablet by mouth daily 90 tablet 3    oxybutynin (DITROPAN-XL) 10 MG extended release tablet Take 10 mg by mouth daily Uses PRN      atorvastatin (LIPITOR) 20 MG tablet Take 1 tablet by mouth once daily 90 tablet 3    magnesium oxide (MAG-OX) 400 MG tablet Take 400 mg by mouth daily      Multiple Vitamins-Minerals (THERAPEUTIC MULTIVITAMIN-MINERALS) tablet Take 1 tablet by mouth daily      omeprazole (PRILOSEC) 20 MG capsule Take 1 capsule by mouth Daily 30 capsule 3    aspirin 81 MG tablet Take 162 mg by mouth daily          FAMILY HISTORY: family history includes Cancer (age of onset: 40) in her mother; Cancer (age of onset: 61) in her sister; Diabetes in her father. Physical Examination:     /72 (Site: Left Upper Arm, Position: Sitting, Cuff Size: Medium Adult)   Pulse 68   Resp 18   Ht 5' 4.02\" (1.626 m)   Wt 113 lb 3.2 oz (51.3 kg)   SpO2 99%   BMI 19.42 kg/m²  Body mass index is 19.42 kg/m². Constitutional: She appeared oriented to person and place. She appears well-developed and well-nourished. In no acute distress. HEENT: Normocephalic and atraumatic. No JVD present. Carotid bruit is not present. No mass and no thyromegaly present. No lymphadenopathy noted. Cardiovascular: Normal rate, regular rhythm, mechanical S1. Loud S2. Short ejection systolic murmur heard at the second left intercostal space without radiation. Pulmonary/Chest: Effort normal and breath sounds normal. No respiratory distress. She has no wheezes, rhonchi or rales. Abdominal: Soft, non-tender. She exhibits no organomegaly, mass or bruit. Extremities: No edema. No cyanosis or clubbing. 2+ radial and carotid pulses. Distal extremity pulses: 2+ bilaterally. .  Neurological: Alertness and orientation as per Constitutional exam. No evidence of gross cranial nerve deficit. Coordination appeared normal.   Skin: Skin is warm and dry. There is no rash or diaphoresis.    Psychiatric: She has a normal mood and affect. Her speech is normal and behavior is normal.      MOST RECENT LABS ON RECORD:   Lab Results   Component Value Date    WBC 5.9 04/16/2021    HGB 13.7 04/16/2021    HCT 41.9 04/16/2021    PLT See Reflexed IPF Result 04/16/2021    CHOL 167 04/16/2021    TRIG 88 04/16/2021    HDL 92 04/16/2021    ALT 17 02/12/2020    AST 26 02/12/2020     04/16/2021    K 4.9 04/16/2021     04/16/2021    CREATININE 0.85 04/16/2021    BUN 24 (H) 04/16/2021    CO2 26 04/16/2021    INR 3.4 10/12/2021       ASSESSMENT:     1. PAF (paroxysmal atrial fibrillation) ([address]    2. ASHD (arteriosclerotic heart disease)    3. S/P CABG x 2    4. Essential hypertension    5. S/P aortic valve and mitral valve replacement    6. Dyslipidemia       PLAN:        Paroxysmal Atrial Fibrillation: Currently maintaining sinus rhythm S/P surgical maze procedure in 2008. Paroxysmal atrial fibrillation noted on prior Cam monitor. Currently complaining of recurrent palpitations. Beta Blocker: Continue Toprol-XL 50 mg daily for better control of the heart rate and palpitations. Patient is refusing to consider ablation. Stroke Risk: CHADS2-VASc Score: greater than 2 (2.2% stroke risk)  [id number] Score for Atrial Fibrillation Stroke Risk   Risk   Factors  Component Value   C CHF No 0   H HTN Yes 1   A2 Age >= 76 No,  (64 y.o.) 0   D DM No 0   S2 Prior Stroke/TIA No 0   V Vascular Disease No 0   A Age 74-69 No,  (64 y.o.) 0   Sc Sex female 1    [id number]  Score  2   Score last updated 8/6/20 6:05 PM EDT    Click here for a link to the UpToDate guideline \"Atrial Fibrillation: Anticoagulation therapy to prevent embolization  Disclaimer: Risk Score calculation is dependent on accuracy of patient problem list and past encounter diagnosis.    Anticoagulation: warfarin (Coumadin) take as directed (goal INR 2.5-3.5)  Additional Testing List: None       Recurrent intermittent palpitations:   · Beta Blocker: Continue Metoprolol succinate (Toprol XL) 50 mg daily. · Calcium Channel Blocker: Not indicated at this time. · Continue warfarin (Coumadin) take as directed (goal INR 2-3)  · I ordered a CAM monitor to be done for 1 week. Based on the results I may start her on Amiodarone.  Atherosclerotic Heart Disease: S/P CABG x2 SVG to LAD and SVG to OM1 in 2008.  Antiplatelet Agent: Continue aspirin 162 mg   Beta Blocker: Continue Metoprolol succinate (Toprol XL) 50 mg daily.  Continue Lipitor 20 mg daily.  Additional counseling: I advised them to call our office or go to the emergency room if they developed worsening or persistent chest pain or increased shortness of breath as this could be life threatening. Essential Hypertension: Controlled  Beta Blocker: Continue Metoprolol succinate (Toprol XL) 50 mg daily. ACE Inibitor/ARB: Continue losartan (Cozaar) 25 mg daily. · Mitral and aortic valve replacement  · Chronic anticoagulation  · Continue Coumadin Clinic for INR, goal INR is 2.5-3.5. · Currently compensated. No evidence of volume overload. · Continue Toprol XL and losartan as outlined above. · Hyperlipidemia: Mixed LDL 57 mg/dL on 4/16/2021  · Cholesterol Reduction Therapy: Continue Atorvastatin (Lipitor) 20 mg daily. I discussed the potential benefits of statin therapy as well as the potential risks including myalgia as well as the rare but potentially serious complication of liver or kidney damage. Although rare, I told them that this could be serious and therefore told them to stop the medication immediately and call if they developed any severe muscle aches or pains and they agreed to do so. Finally, I recommended that she continue her current medications and follow up with you as previously scheduled. FOLLOW UP:   I told Ms. Roxann Marsh to call my office if she had any problems, but otherwise I asked her to Return in about 6 months (around 4/12/2022).  However, I would be happy to see her sooner should the need arise. Sincerely,  Shyanne Joseph MD, F.A.C.C. [facility] Cardiology Specialist    [address], [place], [address] [place] [address]  Phone: [phone], Fax: [phone]     I believe that the risk of significant morbidity and mortality related to the patient's current medical conditions are: intermediate-high. >30 minutes were spent during prep work, discussion and exam of the patient, and follow up documentation and all of their questions were answered. The documentation recorded by the scribe, accurately and completely reflects the services I personally performed and the decisions made by me. Shyanne Joseph MD, F.A.C.C.  October 12, 2021

## 2021-10-12 NOTE — PATIENT INSTRUCTIONS
Continue to monitor urine and stool. Continue to monitor for signs of bleeding. Return to clinic in 6 weeks. Continue warfarin half tablet for 2.5mg Tue,Thur,Sat and whole 5 mg tablet all other days.

## 2021-10-12 NOTE — PATIENT INSTRUCTIONS
SURVEY:    You may be receiving a survey from [employer] regarding your visit today. Please complete the survey to enable us to provide the highest quality of care to you and your family. If you cannot score us a very good on any question, please call the office to discuss how we could have made your experience a very good one. Thank you.

## 2021-11-03 NOTE — PROCEDURES
[address] [facility], [address] [place] [address]                                 EVENT MONITOR    PATIENT NAME: Donna Chambers                    :        1955  MED REC NO:   [id number]                              ROOM:  ACCOUNT NO:   [de-identified]                           ADMIT DATE: 10/12/2021  PROVIDER:     Emre Gilbert    CARDIOVASCULAR DIAGNOSTIC DEPARTMENT    DATE OF STUDY:  10/12/2021    ORDERING PROVIDER:  Emre Gilbert MD    PRIMARY CARE PROVIDER:  ALEXANDER Page    INTERPRETING PHYSICIAN:  Emre Gilbert MD    DIAGNOSIS:  Paroxysmal atrial fibrillation. PHYSICIAN INTERPRETATION:  1.  6 days and 23 hours recorded. 2.  Baseline rhythm is sinus with average heart rate of 61 bpm, ranging  between 38 and 87 bpm.  3.  Pauses:  3 up to 2.7 seconds. 1 pause was recorded after conversion  from a short atrial.  This conversion pause was 2.5 seconds and it  reflected the sinus node recovery time. The longest pause occurred at  around 9:30 am.  Patient reported no symptoms during this time. 4.  Ectopic atrial rhythm. 5.  PAC 0.01%. 6.  PVC 0.01%. 7.  Multiple patient-activated events recorded correlated with normal  sinus rhythm, PACs and PVCs.         Zay Gunderson    D: 2021 16:02:01       T: 2021 16:04:00     [email or web address]  Job#: [id number]     Doc#: Unknown    CC:  USAMA Page CNP

## 2021-11-04 ENCOUNTER — TELEPHONE (OUTPATIENT)
Dept: CARDIOLOGY | Age: 66
End: 2021-11-04

## 2021-11-22 ENCOUNTER — TELEPHONE (OUTPATIENT)
Dept: PHARMACY | Age: 66
End: 2021-11-22

## 2021-11-29 ENCOUNTER — TELEPHONE (OUTPATIENT)
Dept: PHARMACY | Age: 66
End: 2021-11-29

## 2021-11-29 RX ORDER — ATORVASTATIN CALCIUM 20 MG/1
TABLET, FILM COATED ORAL
Qty: 90 TABLET | Refills: 3 | Status: SHIPPED | OUTPATIENT
Start: 2021-11-29

## 2021-11-29 NOTE — TELEPHONE ENCOUNTER
Recent Travel Screening and Travel History documentation:     Travel Screening     Question   Response    In the last month, have you been in contact with someone who was confirmed or suspected to have Coronavirus / COVID-19? Unable to assess    Have you had a COVID-19 viral test in the last 14 days? Unable to assess    Do you have any of the following new or worsening symptoms? Unable to assess    Have you traveled internationally or domestically in the last month? Unable to assess      Travel History   Travel since 10/29/21    No documented travel since 10/29/21        Had to leave [facility].     Sanju Rodriguez, PharmD 11/29/2021 1:41 PM

## 2021-11-30 ENCOUNTER — HOSPITAL ENCOUNTER (OUTPATIENT)
Dept: PHARMACY | Age: 66
Setting detail: THERAPIES SERIES
Discharge: HOME | End: 2021-11-30
Payer: COMMERCIAL

## 2021-11-30 VITALS — BODY MASS INDEX: 20.07 KG/M2 | WEIGHT: 117 LBS

## 2021-11-30 DIAGNOSIS — Z95.2 AORTIC VALVE REPLACED: ICD-10-CM

## 2021-11-30 DIAGNOSIS — Z95.2 S/P MVR (MITRAL VALVE REPLACEMENT): Primary | ICD-10-CM

## 2021-11-30 LAB — INR BLD: 3.2

## 2021-11-30 PROCEDURE — 99211 OFF/OP EST MAY X REQ PHY/QHP: CPT

## 2021-11-30 PROCEDURE — 85610 PROTHROMBIN TIME: CPT

## 2021-11-30 NOTE — PROGRESS NOTES
[facility] [address] [place] [address]/[place]  Medication Management  ANTICOAGULATION    Referring Provider: Dr Alanis Gil     GOAL INR: 2.5-3.5     TODAY'S INR: 3.2     WARFARIN Dosage: Continue warfarin half tablet for 2.5mg Tue,Thur,Sat and whole 5 mg tablet all other days. INR (no units)   Date Value   10/12/2021 3.4   2021 3.2   2021 2.8   2021 3.1   2021 5.1   2021 2.9   2021 3.5     Medication changes:  none    Notes:    Fingerstick INR drawn per clinic protocol. Patient states no visible blood in urine and no black tarry stool. Denies any missed doses of warfarin. No change in other maintenance medications or in diet. Will recheck INR in 6 weeks. Patient acknowledges working in consult agreement with pharmacist as referred by his/her physician.                   For Pharmacy Admin Tracking Only     Intervention Detail: Adherence Monitorin   Total # of Interventions Recommended: 0   Total # of Interventions Accepted: 0   Time Spent (min): 601 [facility], [facility], PharmD

## 2022-01-18 ENCOUNTER — HOSPITAL ENCOUNTER (OUTPATIENT)
Dept: PHARMACY | Age: 67
Setting detail: THERAPIES SERIES
Discharge: HOME | End: 2022-01-18
Payer: COMMERCIAL

## 2022-01-18 VITALS — HEART RATE: 67 BPM | SYSTOLIC BLOOD PRESSURE: 146 MMHG | DIASTOLIC BLOOD PRESSURE: 64 MMHG

## 2022-01-18 DIAGNOSIS — I48.91 ATRIAL FIBRILLATION, UNSPECIFIED TYPE (HCC): ICD-10-CM

## 2022-01-18 DIAGNOSIS — Z95.2 AORTIC VALVE REPLACED: ICD-10-CM

## 2022-01-18 DIAGNOSIS — Z95.2 S/P MVR (MITRAL VALVE REPLACEMENT): Primary | ICD-10-CM

## 2022-01-18 LAB — INR BLD: 2.9

## 2022-01-18 PROCEDURE — 99211 OFF/OP EST MAY X REQ PHY/QHP: CPT | Performed by: FAMILY MEDICINE

## 2022-01-18 PROCEDURE — 85610 PROTHROMBIN TIME: CPT | Performed by: FAMILY MEDICINE

## 2022-01-18 NOTE — PATIENT INSTRUCTIONS
Continue taking warfarin 2.5mg (1/2 tablet) on Tuesdays, Thursdays and Saturdays and 5mg (1 tablet) all other days. Continue to monitor for signs of bleeding. Return to coumadin clinic in 6 weeks.

## 2022-03-01 ENCOUNTER — HOSPITAL ENCOUNTER (OUTPATIENT)
Dept: PHARMACY | Age: 67
Setting detail: THERAPIES SERIES
Discharge: HOME | End: 2022-03-01
Payer: COMMERCIAL

## 2022-03-01 VITALS
DIASTOLIC BLOOD PRESSURE: 80 MMHG | HEART RATE: 68 BPM | WEIGHT: 115 LBS | SYSTOLIC BLOOD PRESSURE: 168 MMHG | BODY MASS INDEX: 19.73 KG/M2

## 2022-03-01 DIAGNOSIS — Z95.2 AORTIC VALVE REPLACED: ICD-10-CM

## 2022-03-01 DIAGNOSIS — Z95.2 S/P MVR (MITRAL VALVE REPLACEMENT): Primary | ICD-10-CM

## 2022-03-01 LAB — INR BLD: 3

## 2022-03-01 PROCEDURE — 85610 PROTHROMBIN TIME: CPT

## 2022-03-01 PROCEDURE — 99211 OFF/OP EST MAY X REQ PHY/QHP: CPT

## 2022-03-01 NOTE — PATIENT INSTRUCTIONS
Continue to monitor urine and stool. Continue to monitor for signs of bleeding. Return to clinic in 6 weeks. Continue warfarin half tablet for 2.5 mg Tue, Thur, Sat and whole 5 mg all other days.

## 2022-03-01 NOTE — PROGRESS NOTES
[facility] [address] [place] [address]/[place]  Medication Management  ANTICOAGULATION    Referring Provider: Dr Umm Roblero     GOAL INR: 2.5 -  3.5     TODAY'S INR: 3.0     WARFARIN Dosage: Continue warfarin half tablet for 2.5 mg Tue, Thur, Sat and whole 5 mg all other days. INR (no units)   Date Value   2022 2.9   2021 3.2   10/12/2021 3.4   2021 3.2   2021 2.8   2021 3.1   2021 5.1     Medication changes:  None    Notes:    Fingerstick INR drawn per clinic protocol. Patient states no visible blood in urine and no black tarry stool. Denies any missed doses of warfarin. No change in other maintenance medications or in diet. Will recheck INR in 6 weeks. Patient acknowledges working in consult agreement with pharmacist as referred by his/her physician.                   For Pharmacy Admin Tracking Only     Intervention Detail: Adherence Monitorin   Total # of Interventions Recommended: 0   Total # of Interventions Accepted: 0   Time Spent (min): 8196 Raul Yepez, [address], PharmD

## 2022-03-28 RX ORDER — METOPROLOL SUCCINATE 50 MG/1
50 TABLET, EXTENDED RELEASE ORAL DAILY
Qty: 90 TABLET | Refills: 3 | Status: SHIPPED | OUTPATIENT
Start: 2022-03-28 | End: 2022-05-31 | Stop reason: ALTCHOICE

## 2022-03-31 ENCOUNTER — HOSPITAL ENCOUNTER (OUTPATIENT)
Age: 67
Setting detail: SPECIMEN
Discharge: HOME | End: 2022-03-31

## 2022-04-01 ENCOUNTER — HOSPITAL ENCOUNTER (OUTPATIENT)
Age: 67
Setting detail: SPECIMEN
Discharge: HOME | End: 2022-04-01

## 2022-04-01 LAB
ABSOLUTE EOS #: 0.16 K/UL (ref 0–0.44)
ABSOLUTE IMMATURE GRANULOCYTE: <0.03 K/UL (ref 0–0.3)
ABSOLUTE LYMPH #: 1.28 K/UL (ref 1.1–3.7)
ABSOLUTE MONO #: 0.46 K/UL (ref 0.1–1.2)
ALBUMIN SERPL-MCNC: 4.7 G/DL (ref 3.5–5.2)
ALBUMIN/GLOBULIN RATIO: 2 (ref 1–2.5)
ALP BLD-CCNC: 72 U/L (ref 35–104)
ALT SERPL-CCNC: 22 U/L (ref 5–33)
ANION GAP SERPL CALCULATED.3IONS-SCNC: 18 MMOL/L (ref 9–17)
AST SERPL-CCNC: 28 U/L
BASOPHILS # BLD: 1 % (ref 0–2)
BASOPHILS ABSOLUTE: 0.06 K/UL (ref 0–0.2)
BILIRUB SERPL-MCNC: 0.71 MG/DL (ref 0.3–1.2)
BUN BLDV-MCNC: 25 MG/DL (ref 8–23)
C. TRACHOMATIS DNA ,URINE: NEGATIVE
CALCIUM SERPL-MCNC: 10 MG/DL (ref 8.6–10.4)
CANDIDA SPECIES, DNA PROBE: NEGATIVE
CHLORIDE BLD-SCNC: 106 MMOL/L (ref 98–107)
CHOLESTEROL, FASTING: 185 MG/DL
CHOLESTEROL/HDL RATIO: 2
CO2: 21 MMOL/L (ref 20–31)
CREAT SERPL-MCNC: 0.78 MG/DL (ref 0.5–0.9)
EOSINOPHILS RELATIVE PERCENT: 3 % (ref 1–4)
FOLATE: >20 NG/ML
GARDNERELLA VAGINALIS, DNA PROBE: NEGATIVE
GFR AFRICAN AMERICAN: >60 ML/MIN
GFR NON-AFRICAN AMERICAN: >60 ML/MIN
GFR SERPL CREATININE-BSD FRML MDRD: ABNORMAL ML/MIN/{1.73_M2}
GLUCOSE BLD-MCNC: 87 MG/DL (ref 70–99)
HCT VFR BLD CALC: 42.2 % (ref 36.3–47.1)
HDLC SERPL-MCNC: 93 MG/DL
HEMOGLOBIN: 13.4 G/DL (ref 11.9–15.1)
IMMATURE GRANULOCYTES: 0 %
LDL CHOLESTEROL: 78 MG/DL (ref 0–130)
LYMPHOCYTES # BLD: 27 % (ref 24–43)
MCH RBC QN AUTO: 31.9 PG (ref 25.2–33.5)
MCHC RBC AUTO-ENTMCNC: 31.8 G/DL (ref 28.4–34.8)
MCV RBC AUTO: 100.5 FL (ref 82.6–102.9)
MONOCYTES # BLD: 10 % (ref 3–12)
N. GONORRHOEAE DNA, URINE: NEGATIVE
NRBC AUTOMATED: 0 PER 100 WBC
PDW BLD-RTO: 12.8 % (ref 11.8–14.4)
PLATELET # BLD: 141 K/UL (ref 138–453)
PMV BLD AUTO: 11.1 FL (ref 8.1–13.5)
POTASSIUM SERPL-SCNC: 4.6 MMOL/L (ref 3.7–5.3)
RBC # BLD: 4.2 M/UL (ref 3.95–5.11)
SEG NEUTROPHILS: 59 % (ref 36–65)
SEGMENTED NEUTROPHILS ABSOLUTE COUNT: 2.85 K/UL (ref 1.5–8.1)
SODIUM BLD-SCNC: 145 MMOL/L (ref 135–144)
SOURCE: NORMAL
SOURCE: NORMAL
SPECIMEN DESCRIPTION: NORMAL
TOTAL PROTEIN: 7.1 G/DL (ref 6.4–8.3)
TRICHOMONAS VAGINALI, MOLECULAR: NEGATIVE
TRICHOMONAS VAGINALIS DNA: NEGATIVE
TRIGLYCERIDE, FASTING: 72 MG/DL
TSH SERPL DL<=0.05 MIU/L-ACNC: 1.41 UIU/ML (ref 0.3–5)
VITAMIN B-12: 1061 PG/ML (ref 232–1245)
VITAMIN D 25-HYDROXY: 70.7 NG/ML
WBC # BLD: 4.8 K/UL (ref 3.5–11.3)

## 2022-04-05 LAB
HERPES SIMPLEX VIRUS 1 IGG: 1.11
HERPES SIMPLEX VIRUS 2 IGG: 0.33
HERPES TYPE 1/2 IGM COMBINED: 0.36

## 2022-04-12 ENCOUNTER — HOSPITAL ENCOUNTER (OUTPATIENT)
Dept: PHARMACY | Age: 67
Setting detail: THERAPIES SERIES
Discharge: HOME | End: 2022-04-12
Payer: COMMERCIAL

## 2022-04-12 VITALS
WEIGHT: 112 LBS | SYSTOLIC BLOOD PRESSURE: 159 MMHG | BODY MASS INDEX: 19.22 KG/M2 | DIASTOLIC BLOOD PRESSURE: 77 MMHG | HEART RATE: 66 BPM

## 2022-04-12 DIAGNOSIS — Z95.2 S/P MVR (MITRAL VALVE REPLACEMENT): Primary | ICD-10-CM

## 2022-04-12 DIAGNOSIS — Z95.2 AORTIC VALVE REPLACED: ICD-10-CM

## 2022-04-12 LAB — INR BLD: 3.2

## 2022-04-12 PROCEDURE — 99211 OFF/OP EST MAY X REQ PHY/QHP: CPT

## 2022-04-12 PROCEDURE — 85610 PROTHROMBIN TIME: CPT

## 2022-04-12 NOTE — PROGRESS NOTES
[facility] [address] [place] [address]/[place]  Medication Management  ANTICOAGULATION    Referring Provider: Edwin Link INR: 2.5-3.5    TODAY'S INR: 3.2    WARFARIN Dosage: Continue warfarin 2.5 mg po every Tuesday, Thursday, Saturday; 5 mg po all other days    INR (no units)   Date Value   2022 3.2   2022 3   2022 2.9   2021 3.2   10/12/2021 3.4   2021 3.2   2021 2.8       Medication changes:  No changes  Notes:    Fingerstick INR drawn per clinic protocol. Patient states no visible blood in urine and no black tarry stool. Denies any missed doses of warfarin. No change in other maintenance medications or in diet. Will recheck INR in 6 weeks. Patient acknowledges working in consult agreement with pharmacist as referred by his/her physician. For Pharmacy Admin Tracking Only     Intervention Detail: Adherence Monitorin   Total # of Interventions Recommended: 0   Total # of Interventions Accepted: 0   Time Spent (min): [address] [facility] [address].  Amrita Narayanan, [place]

## 2022-04-18 DIAGNOSIS — Z79.01 CHRONIC ANTICOAGULATION: ICD-10-CM

## 2022-04-18 DIAGNOSIS — Z95.1 S/P CABG X 2: ICD-10-CM

## 2022-04-18 DIAGNOSIS — I10 ESSENTIAL HYPERTENSION: ICD-10-CM

## 2022-04-18 DIAGNOSIS — I25.10 ASHD (ARTERIOSCLEROTIC HEART DISEASE): ICD-10-CM

## 2022-04-18 RX ORDER — LOSARTAN POTASSIUM 25 MG/1
25 TABLET ORAL DAILY
Qty: 30 TABLET | Refills: 0 | Status: SHIPPED | OUTPATIENT
Start: 2022-04-18 | End: 2022-05-05 | Stop reason: SDUPTHER

## 2022-04-20 ENCOUNTER — TELEPHONE (OUTPATIENT)
Dept: SURGERY | Age: 67
End: 2022-04-20

## 2022-04-27 ENCOUNTER — OFFICE VISIT (OUTPATIENT)
Dept: CARDIOLOGY | Age: 67
End: 2022-04-27
Payer: COMMERCIAL

## 2022-04-27 ENCOUNTER — HOSPITAL ENCOUNTER (OUTPATIENT)
Dept: NON INVASIVE DIAGNOSTICS | Age: 67
Discharge: HOME | End: 2022-04-27
Payer: COMMERCIAL

## 2022-04-27 VITALS
HEIGHT: 64 IN | OXYGEN SATURATION: 99 % | WEIGHT: 112 LBS | HEART RATE: 65 BPM | DIASTOLIC BLOOD PRESSURE: 71 MMHG | BODY MASS INDEX: 19.12 KG/M2 | SYSTOLIC BLOOD PRESSURE: 174 MMHG | RESPIRATION RATE: 18 BRPM

## 2022-04-27 DIAGNOSIS — I25.810 CORONARY ARTERY DISEASE INVOLVING CORONARY BYPASS GRAFT OF NATIVE HEART WITHOUT ANGINA PECTORIS: ICD-10-CM

## 2022-04-27 DIAGNOSIS — Z95.2 S/P AORTIC VALVE AND MITRAL VALVE REPLACEMENT: ICD-10-CM

## 2022-04-27 DIAGNOSIS — I48.0 PAF (PAROXYSMAL ATRIAL FIBRILLATION) (HCC): ICD-10-CM

## 2022-04-27 DIAGNOSIS — R55 NEAR SYNCOPE: ICD-10-CM

## 2022-04-27 DIAGNOSIS — R42 DIZZINESS: ICD-10-CM

## 2022-04-27 DIAGNOSIS — R42 LIGHTHEADED: ICD-10-CM

## 2022-04-27 DIAGNOSIS — I10 ESSENTIAL HYPERTENSION: ICD-10-CM

## 2022-04-27 DIAGNOSIS — E78.2 MIXED HYPERLIPIDEMIA: ICD-10-CM

## 2022-04-27 DIAGNOSIS — Z79.01 CHRONIC ANTICOAGULATION: ICD-10-CM

## 2022-04-27 DIAGNOSIS — R42 LIGHTHEADED: Primary | ICD-10-CM

## 2022-04-27 PROCEDURE — 93242 EXT ECG>48HR<7D RECORDING: CPT

## 2022-04-27 PROCEDURE — 93243 EXT ECG>48HR<7D SCAN A/R: CPT

## 2022-04-27 PROCEDURE — 99214 OFFICE O/P EST MOD 30 MIN: CPT | Performed by: INTERNAL MEDICINE

## 2022-04-27 PROCEDURE — 93000 ELECTROCARDIOGRAM COMPLETE: CPT | Performed by: INTERNAL MEDICINE

## 2022-04-27 NOTE — PROGRESS NOTES
Cm Baum am scribing for and in the presence of Carmen Ch MD, F.A.C.C..    Patient: Hanane Wilder  : 1955  Date of Visit: 2022    REASON FOR VISIT / CONSULTATION: Follow-up (HX: PAF, CAD, HTN, MVP, HLD. Pt states she is doing ok. C/o: Eleveated BP readings with lightheaded and dizziness. Can feel a thud in her chest and then it will hesatate and then go again. Denies: CP, SOB.)      History of Present Illness:        Dear Jenifer Medic,    I had the pleasure of seeing Hanane Wilder in my office today. Ms. Magdaleno Marion is a 61 y.o. female who presented for follow up. She has extensive cardiac history. History of rheumatic fever diagnosed in . She had ischemic colitis back in , this was probably embolic from atrial fibrillation. History of open heart surgery with 2 vessel bypass (SVG to LAD and SVG to OM1), aortic valve replacement with 19 mm Saint Zhao mechanical valve, mitral valve replacement with 27 mm Saint Zhao mechanical valve and maze procedure on 2008. Aortic valve replacement with 21 Amy-Barnett aortic valve and aortic root replacement with bovine patch in addition to tricuspid valve repair in     She has been maintained on warfarin since her episode of ischemic colitis and Aortic/mitral valve replacement with no bleeding complications. She told me that she has never been taking statin therapy. I looked back in the note from Dr. Sherry Woodruff, the only thing I saw was fish oil. Patient had CABGx2 at the time of her mitral and aortic valve replacement. She has history of hypertension. Denied history of diabetes or dyslipidemia. Other relevant medical history includes migraine headaches and anxiety. She said she stopped taking Paxil because it made her gaining weight. CAM monitor done on 2020- 3 days and 1 hour recorded.  Baseline rhythm is sinus with average heart rate of 71 bpm ranging between 42 and 132 bpm. Sinus tachycardia represented 3% of the study distribution. Occasional APCs, 3% with multiple runs of what appeared to be ectopic atrial tachycardia. The longest lasted for 2.5 hours at a heart rate of 174 bpm. These episodes usually terminated into sinus pauses followed by junctional escape beats and sinus bradycardia. A total of 12 pauses more than 2.5 seconds, the longest being 3.0 seconds. Some of these were conversion pauses and happening during typical daytime hours. Occasional PVCs (3%) with 2 runs of wide QRS tachycardia. The longest was 5 beats at a heart rate of 134 bpm.  Cannot exclude supraventricular tachycardia with aberrant conduction. We offered her an ablation procedure after getting the CAM monitor but patient refused to do this during the pandemic. Ms. Lucy Rush is here today for follow up due to her feeling worsen lightheaded and dizziness. This has slowly gotten worse over the last couple of months. She also has had some higher blood pressure readings. She had had no falls or passing out episodes however she has had to go sit down to wait for her dizziness to pass. Her last bad episode was about a week ago. She was looking out the window at a groundhog and had to sit down. She does not think she is drinking enough fluids. She is eating well. Sometimes she can get an orange tint color in her urine if she picks up something heavy however no blood in her urine or stool. She denies any chest pain, pressure. No significant shortness of breath. No orthopnea or PND. No fever or cough. No abdominal pain, nausea or vomiting. No problem moving her bowel. No bleeding complications from taking aspirin and warfarin. Weight is stable. No change in her appetite. She sleeps well at night. EKG done today in office (4/27/2022): Showed normal sinus rhythm no ischemic changes from prior. Also reviewed her most recently blood work and it was fairly unremarkable.      PAST MEDICAL HISTORY:         Past Medical History:   Diagnosis Date    Atrial fibrillation ([facility]     CAD (coronary artery disease)     [facility]     Congestive heart failure (CHF) ([facility]     Depression 2015    H/O mitral valve replacement     H/O tricuspid valve repair     Hypertension     Ischemic colitis ([facility] ,     Migraine     Migraine     Migraine     PAF (paroxysmal atrial fibrillation) ([facility])     Rheumatic fever     S/P AVR     SOB (shortness of breath)        CURRENT ALLERGIES: Patient has no known allergies. REVIEW OF SYSTEMS: 14 systems were reviewed. Pertinent positives and negatives as above, all else negative.      Past Surgical History:   Procedure Laterality Date    AORTIC VALVE REPLACEMENT      CARDIAC CATHETERIZATION      CARDIAC VALVE REPLACEMENT      aortic and mitral    CHOLECYSTECTOMY      COLONOSCOPY      MITRAL VALVE REPLACEMENT      OVARIAN CYST REMOVAL Right     PRE-MALIGNANT / BENIGN SKIN LESION EXCISION Left 13    face    TONSILLECTOMY AND ADENOIDECTOMY      TRANSESOPHAGEAL ECHOCARDIOGRAM  2017    TUBAL LIGATION      Social History:  Social History     Tobacco Use    Smoking status: Former Smoker     Packs/day: 0.50     Years: 20.00     Pack years: 10.00     Quit date: 3/28/1993     Years since quittin.1    Smokeless tobacco: Never Used   Substance Use Topics    Alcohol use: No    Drug use: No        CURRENT MEDICATIONS:        Outpatient Medications Marked as Taking for the 22 encounter (Office Visit) with Tanya Bullock MD   Medication Sig Dispense Refill    losartan (COZAAR) 25 MG tablet Take 1 tablet by mouth daily (Patient taking differently: Take 50 mg by mouth daily ) 30 tablet 0    metoprolol succinate (TOPROL XL) 50 MG extended release tablet Take 1 tablet by mouth daily 90 tablet 3    atorvastatin (LIPITOR) 20 MG tablet Take 1 tablet by mouth once daily 90 tablet 3    warfarin (COUMADIN) 5 MG tablet Take 1 tablet by mouth See Admin Instructions [facility]:  2.5 mg every Tuesday Thursday Saturday;  5 mg all other days 90 tablet 3    Biotin 11192 MCG TABS Take 1 tablet by mouth daily      aspirin-acetaminophen-caffeine (EXCEDRIN MIGRAINE) 250-250-65 MG per tablet Take 1 tablet by mouth every 6 hours as needed for Headaches      oxybutynin (DITROPAN-XL) 10 MG extended release tablet Take 10 mg by mouth daily Uses PRN      magnesium oxide (MAG-OX) 400 MG tablet Take 400 mg by mouth daily      Multiple Vitamins-Minerals (THERAPEUTIC MULTIVITAMIN-MINERALS) tablet Take 1 tablet by mouth daily      omeprazole (PRILOSEC) 20 MG capsule Take 1 capsule by mouth Daily 30 capsule 3    aspirin 81 MG tablet Take 162 mg by mouth daily          FAMILY HISTORY: family history includes Cancer (age of onset: 40) in her mother; Cancer (age of onset: 61) in her sister; Diabetes in her father. Physical Examination:     BP (!) 174/71 (Site: Left Upper Arm, Position: Standing, Cuff Size: Medium Adult)   Pulse 65   Resp 18   Ht 5' 3.5\" (1.613 m)   Wt 112 lb (50.8 kg)   SpO2 99%   BMI 19.53 kg/m²  Body mass index is 19.53 kg/m². Constitutional: She appeared oriented to person and place. She appears well-developed and well-nourished. In no acute distress. HEENT: Normocephalic and atraumatic. No JVD present. Carotid bruit is not present. No mass and no thyromegaly present. No lymphadenopathy noted. Cardiovascular: Normal rate, regular rhythm, mechanical S1. Loud S2. Short ejection systolic murmur heard at the second left intercostal space without radiation. Pulmonary/Chest: Effort normal and breath sounds normal. No respiratory distress. She has no wheezes, rhonchi or rales. Abdominal: Soft, non-tender. She exhibits no organomegaly, mass or bruit. Extremities: No edema. No cyanosis or clubbing. 2+ radial and carotid pulses. Distal extremity pulses: 2+ bilaterally. .  Neurological: Alertness and orientation as per Constitutional exam. No evidence of gross cranial nerve deficit. Coordination appeared normal.   Skin: Skin is warm and dry. There is no rash or diaphoresis. Psychiatric: She has a normal mood and affect. Her speech is normal and behavior is normal.      MOST RECENT LABS ON RECORD:   Lab Results   Component Value Date    WBC 4.8 04/01/2022    HGB 13.4 04/01/2022    HCT 42.2 04/01/2022     04/01/2022    CHOL 167 04/16/2021    TRIG 88 04/16/2021    HDL 93 04/01/2022    ALT 22 04/01/2022    AST 28 04/01/2022     (H) 04/01/2022    K 4.6 04/01/2022     04/01/2022    CREATININE 0.78 04/01/2022    BUN 25 (H) 04/01/2022    CO2 21 04/01/2022    TSH 1.41 04/01/2022    INR 3.2 04/12/2022       ASSESSMENT:     1. Lightheaded    2. Dizziness    3. Near syncope    4. PAF (paroxysmal atrial fibrillation) ([address]    5. Coronary artery disease involving coronary bypass graft of native heart without angina pectoris    6. S/P aortic valve and mitral valve replacement    7. Chronic anticoagulation    8. Essential hypertension    9. Mixed hyperlipidemia       PLAN:        · Near syncope of unknown etiology/ Lightheaded and Dizziness: I did let her know that this can be very serious considering her past cardiac history including her open heart surgery and her valve replacement as well. I did advise her that depending on these results of her cardiac testing as below we may be heading for a pacemaker implant. · Nonpharmacologic counseling: Because of her condition, I reminded her to try and keep herself well-hydrated and to take extra time when moving from laying to sitting, sitting to standing and standing to walking. I also explained to her to help improve her symptoms she should include 3 g sodium diet, 1 or 2 L of sports drinks daily, knee-high compressions stockings.   · Additional Testing List: I ordered a head upright TILT TABLE TEST to try and better assess the etiology of the patient's recent symptoms · Additional Testing: I Ordered an Echocardiogram to assess Ms. Vega's ejection fraction and to look for significant valvular heart disease as a source of Ms. Vega symptoms   · Additional Testing: I Ordered a CAM monitor to try and pinpoint the etiology of Ms. Vega's symptoms. Paroxysmal Atrial Fibrillation: Currently maintaining sinus rhythm S/P surgical maze procedure in 2008. Paroxysmal atrial fibrillation noted on prior Cam monitor. Beta Blocker: Continue Toprol-XL 50 mg daily for better control of the heart rate and palpitations. Stroke Risk: CHADS2-VASc Score: greater than 2 (2.2% stroke risk)  [id number] Score for Atrial Fibrillation Stroke Risk   Risk   Factors  Component Value   C CHF No 0   H HTN Yes 1   A2 Age >= 76 No,  (64 y.o.) 0   D DM No 0   S2 Prior Stroke/TIA No 0   V Vascular Disease No 0   A Age 74-69 No,  (64 y.o.) 0   Sc Sex female 1    [id number]  Score  2   Score last updated 7/0/57 2:87 PM EDT  Click here for a link to the UpToDate guideline \"Atrial Fibrillation: Anticoagulation therapy to prevent embolization  Disclaimer: Risk Score calculation is dependent on accuracy of patient problem list and past encounter diagnosis. · Anticoagulation: warfarin (Coumadin) take as directed (goal INR 2.5-3.5)   · Additional Testing: I Ordered a CAM monitor to try and pinpoint the etiology of Ms. Vega's symptoms.  Atherosclerotic Heart Disease: S/P CABG x2 SVG to LAD and SVG to OM1 in 2008.  Antiplatelet Agent: Continue aspirin 162 mg daily.  Beta Blocker: Continue Metoprolol succinate (Toprol XL) 50 mg daily.  Cholesterol Reduction Therapy: Continue Atorvastatin (Lipitor) 20 mg daily.  Additional counseling: I advised them to call our office or go to the emergency room if they developed worsening or persistent chest pain or increased shortness of breath as this could be life threatening.      · Mitral and aortic valve replacement: Chronic anticoagulation, Currently compensated. No evidence of volume overload. · Continue Coumadin Clinic for INR, goal INR is 2.5-3.5. · Beta Blocker: Continue Metoprolol succinate (Toprol XL) 50 mg daily. ACE Inibitor/ARB: Continue losartan (Cozaar) 50 mg daily. · Additional Testing: I Ordered an Echocardiogram to assess Ms. Vega's ejection fraction and to look for significant valvular heart disease as a source of Ms. Vega symptoms    · Essential Hypertension: Controlled   Beta Blocker: Continue Metoprolol succinate (Toprol XL) 50 mg daily.  ACE Inibitor/ARB: Continue losartan (Cozaar) 50 mg daily. · Hyperlipidemia: Mixed LDL 75 mg/dL on 4/1/2022  · Cholesterol Reduction Therapy: Continue Atorvastatin (Lipitor) 20 mg daily. Finally, I recommended that she continue her current medications and follow up with you as previously scheduled. FOLLOW UP:   I told Ms. Silvia Epps to call my office if she had any problems, but otherwise I asked her to Return in about 2 weeks (around 5/11/2022). However, I would be happy to see her sooner should the need arise. Sincerely,  Guera Flynn MD, F.A.C.C. [facility] Cardiology Specialist    [address] [place] [address], [address] [place] [address]  Phone: [phone], Fax: [phone]     I believe that the risk of significant morbidity and mortality related to the patient's current medical conditions are: intermediate-high. Approximately 35 minutes were spent during prep work, discussion and exam of the patient, and follow up documentation and all of their questions were answered. The documentation recorded by the scribe, accurately and completely reflects the services I personally performed and the decisions made by me. Guera Flynn MD, F.A.C.C.  April 27, 2022

## 2022-04-27 NOTE — PATIENT INSTRUCTIONS
SURVEY:    You may be receiving a survey from [employer] regarding your visit today. Please complete the survey to enable us to provide the highest quality of care to you and your family. If you cannot score us a very good on any question, please call the office to discuss how we could have made your experience a very good one. Thank you.

## 2022-05-05 DIAGNOSIS — Z95.1 S/P CABG X 2: ICD-10-CM

## 2022-05-05 DIAGNOSIS — I25.10 ASHD (ARTERIOSCLEROTIC HEART DISEASE): ICD-10-CM

## 2022-05-05 DIAGNOSIS — Z79.01 CHRONIC ANTICOAGULATION: ICD-10-CM

## 2022-05-05 DIAGNOSIS — I10 ESSENTIAL HYPERTENSION: ICD-10-CM

## 2022-05-05 RX ORDER — LOSARTAN POTASSIUM 50 MG/1
50 TABLET ORAL DAILY
Qty: 90 TABLET | Refills: 3 | Status: SHIPPED | OUTPATIENT
Start: 2022-05-05 | End: 2022-05-31

## 2022-05-05 RX ORDER — LOSARTAN POTASSIUM 50 MG/1
50 TABLET ORAL DAILY
Qty: 90 TABLET | Refills: 3 | Status: SHIPPED | OUTPATIENT
Start: 2022-05-05 | End: 2022-05-05 | Stop reason: SDUPTHER

## 2022-05-11 NOTE — PROCEDURES
[address] [facility], [address] [place] [address]                                 EVENT MONITOR    PATIENT NAME: Mahogany Perez                    :        1955  MED REC NO:   [id number]                              ROOM:  ACCOUNT NO:   [de-identified]                           ADMIT DATE: 2022  PROVIDER:     Miguel Rg MD    CARDIOVASCULAR DIAGNOSTIC DEPARTMENT    DATE OF STUDY:  2022    ORDERING PROVIDER:  Miguel Rg MD    PRIMARY CARE PROVIDER:  ALEXANDER Snider    INTERPRETING PHYSICIAN:  Miguel Rg MD    DIAGNOSIS:  Dizziness and giddiness. PHYSICIAN INTERPRETATION:  1.  6 days and 19 hours recorded. 2.  Baseline rhythm is sinus with average heart rate of 65 bpm, ranging  between 38 and 79 bpm.  3.  Severe bradycardia represented less than 1% of the study duration. 4.  67 pauses recorded, the longest was 3.1 seconds. Some of these  pauses are compensatory pauses following isolated PVCs. 5.  Very frequent short episodes of atrial tachycardia, the longest  lasted for 31 beats at an average heart rate of 151 bpm.  6.  Occasional PVCs. PVC burden 2.19% with multiple bigeminy and  ventricular couplets. No ventricular runs. 7.  Further work-up, including but not limited to pacemaker. Therapy  may be needed as clinically indicated.         Katey Rose MD    D: 2022 16:37:11       T: 2022 16:38:18     [email or web address]  Job#: [id number]     Doc#: Unknown    CC:  USAMA Snider CNP

## 2022-05-12 ENCOUNTER — HOSPITAL ENCOUNTER (OUTPATIENT)
Dept: NON INVASIVE DIAGNOSTICS | Age: 67
Discharge: HOME | End: 2022-05-12
Payer: COMMERCIAL

## 2022-05-12 DIAGNOSIS — Z95.2 S/P AORTIC VALVE AND MITRAL VALVE REPLACEMENT: ICD-10-CM

## 2022-05-12 DIAGNOSIS — I25.810 CORONARY ARTERY DISEASE INVOLVING CORONARY BYPASS GRAFT OF NATIVE HEART WITHOUT ANGINA PECTORIS: ICD-10-CM

## 2022-05-12 DIAGNOSIS — R42 DIZZINESS: ICD-10-CM

## 2022-05-12 DIAGNOSIS — R42 LIGHTHEADED: ICD-10-CM

## 2022-05-12 DIAGNOSIS — I10 ESSENTIAL HYPERTENSION: ICD-10-CM

## 2022-05-12 DIAGNOSIS — Z79.01 CHRONIC ANTICOAGULATION: ICD-10-CM

## 2022-05-12 DIAGNOSIS — I48.0 PAF (PAROXYSMAL ATRIAL FIBRILLATION) (HCC): ICD-10-CM

## 2022-05-12 DIAGNOSIS — R55 NEAR SYNCOPE: ICD-10-CM

## 2022-05-12 DIAGNOSIS — E78.2 MIXED HYPERLIPIDEMIA: ICD-10-CM

## 2022-05-12 LAB
LV EF: 60 %
LVEF MODALITY: NORMAL

## 2022-05-12 PROCEDURE — 93660 TILT TABLE EVALUATION: CPT

## 2022-05-12 PROCEDURE — 6370000000 HC RX 637 (ALT 250 FOR IP): Performed by: FAMILY MEDICINE

## 2022-05-12 PROCEDURE — 93306 TTE W/DOPPLER COMPLETE: CPT

## 2022-05-12 RX ORDER — NITROGLYCERIN 0.3 MG/1
0.3 TABLET SUBLINGUAL ONCE
Status: COMPLETED | OUTPATIENT
Start: 2022-05-12 | End: 2022-05-12

## 2022-05-12 RX ADMIN — NITROGLYCERIN 0.3 MG: 0.3 TABLET SUBLINGUAL at 13:52

## 2022-05-13 ENCOUNTER — TELEPHONE (OUTPATIENT)
Dept: CARDIOLOGY | Age: 67
End: 2022-05-13

## 2022-05-13 NOTE — TELEPHONE ENCOUNTER
----- Message from Anibal Montague MD sent at 5/12/2022 10:55 PM EDT -----  Echo didn't change from before.

## 2022-05-13 NOTE — PROCEDURES
[address]                                TILT TABLE TEST    PATIENT NAME: Orlando Matos                    :        1955  MED REC NO:   [id number]                              ROOM:  ACCOUNT NO:   [de-identified]                           ADMIT DATE: 2022  PROVIDER:     Darnell Shone, MD    Cardiovascular Diagnostics Department    DATE OF PROCEDURE:  2022    ORDERING PROVIDER:  Esteban Johnson MD    PRIMARY CARE PROVIDER:  USAMA Alamo-CNP    INTERPRETING PHYSICIAN:  Darnell Shone, MD     Diagnosis:  Near syncope, dizziness, lightheadedness. PROCEDURE SUMMARY:  After explaining the risk, benefits and alternatives  to the procedure, informed written consent was obtained. The patient  was brought to the tilt table laboratory in a fasting and resting state. The patient was placed on the tilt table in the supine position, ECG  patches were applied and an IV was placed. The patient's baseline blood  pressure was 186/69 mmHg with a heart rate of 70/minute. The patient  was then raised to the 70 degree head upright tilt position with and  pulse rate, blood pressure and cardiac rhythm were monitored and  recorded each minute of the study for a maximum of 30 minutes. During the initial 20 minutes of the study, the patient's blood pressure  ranged from a high of 162/82 mmHg to a low of 132/74 mmHg, while their  heart rate ranged from a low of 69/minute to a high of 75/minute. During this period, the patient reported no symptoms. During the last 10 minutes of the study, nitroglycerin 0.3 mg was give  sublingually. During this time, the patient's blood pressure ranged  from a high of 142/75 mmHg to a low of 60/23 mmHg, while their heart  rate ranged from a low of 65/minute to a high of 78/minute.   During this  period, the patient reported severe lightheadedness, feeling anxious,  feeling as if they were going to pass out. At this point, the patient was returned to the supine position and  monitored for an additional ten minutes. Once the patient felt well  enough to be discharged home, they were discharged home with  instructions to follow up with their primary care physician and/or  cardiologist as previously scheduled. STUDY CONCLUSIONS:  Abnormal head upright tilt table study. The patient's heart rate, blood  pressure response and symptoms were most consistent with dysautonomia. Combined with vigilant maintenance of euvolemia and maintaining a  moderate salt intake, pharmacologic treatment with a Serotonin Selective  Reuptake Inhibitor (SSRI) such as Lexapro and/or Mestinon among other  treatments have shown some effectiveness in the treatment of this  condition.           Jacob Crook MD    D: 05/13/2022 9:07:04       T: 05/13/2022 9:08:00     DANICA_KECIA  Job#: [id number]     Doc#: Unknown    CC:  MD Eloy Lopez APRN - CNP

## 2022-05-17 ENCOUNTER — TELEPHONE (OUTPATIENT)
Dept: CARDIOLOGY | Age: 67
End: 2022-05-17

## 2022-05-17 NOTE — TELEPHONE ENCOUNTER
----- Message from Maury Schmidt MD sent at 5/16/2022  8:28 PM EDT -----  We will discuss details of the heart monitor on follow-up.   Thank you

## 2022-05-18 ENCOUNTER — TELEPHONE (OUTPATIENT)
Dept: CARDIOLOGY | Age: 67
End: 2022-05-18

## 2022-05-18 NOTE — TELEPHONE ENCOUNTER
----- Message from Catie Vigil MD sent at 5/17/2022 10:19 PM EDT -----  Abnormal tilt, we will discuss on follow up.  Thank you

## 2022-05-24 ENCOUNTER — HOSPITAL ENCOUNTER (OUTPATIENT)
Dept: PHARMACY | Age: 67
Setting detail: THERAPIES SERIES
Discharge: HOME | End: 2022-05-24
Payer: COMMERCIAL

## 2022-05-24 VITALS
BODY MASS INDEX: 19.35 KG/M2 | SYSTOLIC BLOOD PRESSURE: 175 MMHG | WEIGHT: 111 LBS | HEART RATE: 70 BPM | DIASTOLIC BLOOD PRESSURE: 88 MMHG

## 2022-05-24 DIAGNOSIS — Z95.2 AORTIC VALVE REPLACED: ICD-10-CM

## 2022-05-24 DIAGNOSIS — Z95.2 S/P MVR (MITRAL VALVE REPLACEMENT): Primary | ICD-10-CM

## 2022-05-24 LAB — INR BLD: 4.6

## 2022-05-24 PROCEDURE — 99212 OFFICE O/P EST SF 10 MIN: CPT

## 2022-05-24 PROCEDURE — 85610 PROTHROMBIN TIME: CPT

## 2022-05-24 NOTE — PATIENT INSTRUCTIONS
Please hold warfarin for 2 days  Decrease current dose of warfarin as instructed on dosing calendar provided. Return to clinic in 2 weeks. Continue to monitor urine and stool for signs and symptoms of bleeding. Please notify the clinic of any medication changes. Please remember to bring all medications (both prescription and OTC) to your next visit. Kindly notify the clinic if you are unable to make to your next appointment.

## 2022-05-24 NOTE — PROGRESS NOTES
[facility]  Medication Management  ANTICOAGULATION    Referring Provider: Jaxon Setarns INR: 2.5-3.5    TODAY'S INR: 4.6    WARFARIN Dosage: Decrease warfarin to 5 mg po every MWF; 2.5 mg po all other days  Patient will hold warfarin today and tomorrow. INR (no units)   Date Value   2022 4.6   2022 3.2   2022 3   2022 2.9   2021 3.2   10/12/2021 3.4   2021 3.2       Medication changes:  Increase losartan to 50 mg po daily  Notes:    Fingerstick INR drawn per clinic protocol. Patient states no visible blood in urine and no black tarry stool. Denies any missed doses of warfarin. No change in other maintenance medications or in diet. Will recheck INR in 2 weeks. Patient acknowledges working in consult agreement with pharmacist as referred by his/her physician. Patient reports increased stress level as her boyfriend has been sick. For Pharmacy Admin Tracking Only     Intervention Detail: Adherence Monitorin and Dose Adjustment: 3, reason: Therapy Optimization   Total # of Interventions Recommended: 3   Total # of Interventions Accepted: 3   Time Spent (min): [address]  Imtiaz Laguna [facility]

## 2022-05-31 ENCOUNTER — OFFICE VISIT (OUTPATIENT)
Dept: CARDIOLOGY | Age: 67
End: 2022-05-31
Payer: COMMERCIAL

## 2022-05-31 VITALS
RESPIRATION RATE: 18 BRPM | DIASTOLIC BLOOD PRESSURE: 67 MMHG | OXYGEN SATURATION: 99 % | SYSTOLIC BLOOD PRESSURE: 165 MMHG | WEIGHT: 110 LBS | HEART RATE: 62 BPM | BODY MASS INDEX: 18.78 KG/M2 | HEIGHT: 64 IN

## 2022-05-31 DIAGNOSIS — Z95.1 S/P CABG X 2: ICD-10-CM

## 2022-05-31 DIAGNOSIS — E78.2 MIXED HYPERLIPIDEMIA: ICD-10-CM

## 2022-05-31 DIAGNOSIS — I25.10 ASHD (ARTERIOSCLEROTIC HEART DISEASE): ICD-10-CM

## 2022-05-31 DIAGNOSIS — Z95.2 S/P AORTIC VALVE AND MITRAL VALVE REPLACEMENT: ICD-10-CM

## 2022-05-31 DIAGNOSIS — Z79.01 CHRONIC ANTICOAGULATION: ICD-10-CM

## 2022-05-31 DIAGNOSIS — I10 ESSENTIAL HYPERTENSION: ICD-10-CM

## 2022-05-31 DIAGNOSIS — I48.0 PAF (PAROXYSMAL ATRIAL FIBRILLATION) (HCC): Primary | ICD-10-CM

## 2022-05-31 PROCEDURE — 99214 OFFICE O/P EST MOD 30 MIN: CPT | Performed by: INTERNAL MEDICINE

## 2022-05-31 RX ORDER — LOSARTAN POTASSIUM 50 MG/1
50 TABLET ORAL 2 TIMES DAILY
Qty: 180 TABLET | Refills: 3 | Status: SHIPPED | OUTPATIENT
Start: 2022-05-31 | End: 2022-06-09

## 2022-05-31 RX ORDER — CARVEDILOL 12.5 MG/1
12.5 TABLET ORAL 2 TIMES DAILY WITH MEALS
Qty: 180 TABLET | Refills: 3 | Status: SHIPPED | OUTPATIENT
Start: 2022-05-31 | End: 2022-06-22

## 2022-05-31 NOTE — PROGRESS NOTES
Lexii Reyes am scribing for and in the presence of Ruddy Epperson MD, F.A.C.C..    Patient: Hetal Love  : 1955  Date of Visit: May 31, 2022    REASON FOR VISIT / CONSULTATION: Follow-up (HX: Syncope, PAF, CAD. Pt states she is doing well. C/o: Tirdness. Heart Palpitations on and off. Denies: CP, SOB.)      History of Present Illness:        Dear Kimberlee Gee,    I had the pleasure of seeing Hetal Love in my office today. Ms. Julia Le is a 61 y.o. female who presented for follow up. She has extensive cardiac history. History of rheumatic fever diagnosed in . She had ischemic colitis back in , this was probably embolic from atrial fibrillation. History of open heart surgery with 2 vessel bypass (SVG to LAD and SVG to OM1), aortic valve replacement with 19 mm Saint Zhao mechanical valve, mitral valve replacement with 27 mm Saint Zhao mechanical valve and maze procedure on 2008. Aortic valve replacement with 21 Amy-Barnett aortic valve and aortic root replacement with bovine patch in addition to tricuspid valve repair in     She has been maintained on warfarin since her episode of ischemic colitis and Aortic/mitral valve replacement with no bleeding complications. She told me that she has never been taking statin therapy. I looked back in the note from Dr. Eva Nathan, the only thing I saw was fish oil. Patient had CABGx2 at the time of her mitral and aortic valve replacement. She has history of hypertension. Denied history of diabetes or dyslipidemia. Other relevant medical history includes migraine headaches and anxiety. She said she stopped taking Paxil because it made her gaining weight. CAM monitor done on 2020- 3 days and 1 hour recorded. Baseline rhythm is sinus with average heart rate of 71 bpm ranging between 42 and 132 bpm. Sinus tachycardia represented 3% of the study distribution.  Occasional APCs, 3% with multiple runs of what appeared to be ectopic atrial tachycardia. The longest lasted for 2.5 hours at a heart rate of 174 bpm. These episodes usually terminated into sinus pauses followed by junctional escape beats and sinus bradycardia. A total of 12 pauses more than 2.5 seconds, the longest being 3.0 seconds. Some of these were conversion pauses and happening during typical daytime hours. Occasional PVCs (3%) with 2 runs of wide QRS tachycardia. The longest was 5 beats at a heart rate of 134 bpm.  Cannot exclude supraventricular tachycardia with aberrant conduction. We offered her an ablation procedure after getting the CAM monitor but patient refused to do this during the pandemic. EKG done in office on (4/27/2022): Showed normal sinus rhythm no ischemic changes from prior. CAM done on 4/27/2022: 6 days and 19 hours recorded. Baseline rhythm is sinus with average heart rate of 65 bpm, ranging between 38 and 79 bpm. Severe bradycardia represented less than 1% of the study duration. 67 pauses recorded, the longest was 3.1 seconds. Some of these pauses are compensatory pauses following isolated PVCs. Very frequent short episodes of atrial tachycardia, the longest lasted for 31 beats at an average heart rate of 151 bpm. Occasional PVCs. PVC burden 2.19% with multiple bigeminy and ventricular couplets. No ventricular runs. Further work-up, including but not limited to pacemaker. Therapy may be needed as clinically indicated. Tilt Table Test done on 5/12/2022: Abnormal head upright tilt table study. The patient's heart rate, blood  pressure response and symptoms were most consistent with dysautonomia. Echo done on 5/12/2022: Global left ventricular systolic function appears preserved with an estimated ejection fraction of 60%. The left ventricular cavity size is within normal limits and the left ventricular wall thickness is within normal limits.  The inferoseptal wall is abnormal in its motion which is not unusual status post open heart surgery. S/P Aortic valve replacement with 21 mm CE valve, mean gradient across the aortic valve is 5 mmHg. No significant aortic regurgitation. S/P mechanical MVR with a mean gradient of 6.3 mmHg. No significant mitral regurgitation. S/P tricuspid valve repair with a mean gradient of 3 mmHg. Mild to moderate tricuspid regurgitation. Mild pulmonary hypertension with an estimated right ventricular systolic pressure of 35 mmHg. Evidence of moderate diastolic dysfunction is seen. Compared to the previous study of 4/16/2021, no significant change was seen. Ms. Taqueria Bryan is here today for follow up. She report she has had some increase in tiredness. She has had no motive to do stuff this is more of an attitude thing and not her body limiting her from doing more. She has been however very busy in the yard with mowing and everything. She does have a riding mower and it takes her about three hours a to do. She can also pull weeds. She is able to do her own grocery shopping with no significant external symptoms. She does feel some chest discomfort however she can relate this to stress. It does not radiate any where. She has had this pain since her  passed away. No shortness of breath or lightheaded/ dizziness. She is not drinking enough fluids she feels. She does drink diet 7 up most of the time. She also stated she feels her skipped heart beats a lot. She can feel some fast heart beats as well that can last up to a minute in duration. She has no issues sleeping at night. She denies any chest pain, pressure. No significant shortness of breath. No orthopnea or PND. No fever or cough. No abdominal pain, nausea or vomiting. No problem moving her bowel. No bleeding complications from taking aspirin and warfarin. Weight is stable. No change in her appetite.     Bleeding Risks: Ms. Taqueria Bryan denies any current or recent bleeding problems including a history of a GI bleed, ulcers, recent or upcoming surgeries, blood in her stool or black tarry stools or blood in her urine. PAST MEDICAL HISTORY:         Past Medical History:   Diagnosis Date    [facility]     CAD (coronary artery disease)     [facility]     [facility]     Depression 2015    H/O mitral valve replacement     H/O tricuspid valve repair     Hypertension     Ischemic colitis ([facility] ,     Migraine     Migraine     Migraine     PAF (paroxysmal atrial fibrillation) ([facility])     Rheumatic fever     S/P AVR     SOB (shortness of breath)        CURRENT ALLERGIES: Patient has no known allergies. REVIEW OF SYSTEMS: 14 systems were reviewed. Pertinent positives and negatives as above, all else negative.      Past Surgical History:   Procedure Laterality Date    AORTIC VALVE REPLACEMENT      CARDIAC CATHETERIZATION      CARDIAC VALVE REPLACEMENT      aortic and mitral    CHOLECYSTECTOMY      COLONOSCOPY      MITRAL VALVE REPLACEMENT      OVARIAN CYST REMOVAL Right 1977    PRE-MALIGNANT / BENIGN SKIN LESION EXCISION Left 13    face    TONSILLECTOMY AND ADENOIDECTOMY      TRANSESOPHAGEAL ECHOCARDIOGRAM  2017    TUBAL LIGATION      Social History:  Social History     Tobacco Use    Smoking status: Former Smoker     Packs/day: 0.50     Years: 20.00     Pack years: 10.00     Quit date: 3/28/1993     Years since quittin.1    Smokeless tobacco: Never Used   Substance Use Topics    Alcohol use: No    Drug use: No        CURRENT MEDICATIONS:        Outpatient Medications Marked as Taking for the 22 encounter (Office Visit) with Emily Mack MD   Medication Sig Dispense Refill    losartan (COZAAR) 50 MG tablet Take 1 tablet by mouth daily 90 tablet 3    metoprolol succinate (TOPROL XL) 50 MG extended release tablet Take 1 tablet by mouth daily 90 tablet 3    atorvastatin (LIPITOR) 20 MG tablet Take 1 tablet by mouth once daily 90 tablet 3    warfarin (COUMADIN) 5 MG tablet Take 1 tablet by mouth See Admin Instructions Coumadin Clinic:  2.5 mg every Tuesday Thursday Saturday;  5 mg all other days 90 tablet 3    Biotin 23765 MCG TABS Take 1 tablet by mouth daily      aspirin-acetaminophen-caffeine (EXCEDRIN MIGRAINE) 250-250-65 MG per tablet Take 1 tablet by mouth every 6 hours as needed for Headaches      oxybutynin (DITROPAN-XL) 10 MG extended release tablet Take 10 mg by mouth daily Uses PRN      magnesium oxide (MAG-OX) 400 MG tablet Take 400 mg by mouth daily      Multiple Vitamins-Minerals (THERAPEUTIC MULTIVITAMIN-MINERALS) tablet Take 1 tablet by mouth daily      omeprazole (PRILOSEC) 20 MG capsule Take 1 capsule by mouth Daily 30 capsule 3    aspirin 81 MG tablet Take 162 mg by mouth daily          FAMILY HISTORY: family history includes Cancer (age of onset: 40) in her mother; Cancer (age of onset: 61) in her sister; Diabetes in her father. Physical Examination:     BP (!) 158/80 (Site: Right Upper Arm, Position: Sitting, Cuff Size: Small Adult)   Pulse 61   Resp 18   Ht 5' 4\" (1.626 m)   Wt 110 lb (49.9 kg)   SpO2 99%   BMI 18.88 kg/m²  Body mass index is 18.88 kg/m². Constitutional: She appeared oriented to person and place. She appears well-developed and well-nourished. In no acute distress. HEENT: Normocephalic and atraumatic. No JVD present. Carotid bruit is not present. No mass and no thyromegaly present. No lymphadenopathy noted. Cardiovascular: Normal rate, regular rhythm, mechanical S1. Loud S2. Short ejection systolic murmur heard at the second left intercostal space without radiation. Pulmonary/Chest: Effort normal and breath sounds normal. No respiratory distress. She has no wheezes, rhonchi or rales. Abdominal: Soft, non-tender. She exhibits no organomegaly, mass or bruit. Extremities: No edema. No cyanosis or clubbing. 2+ radial and carotid pulses.  Distal extremity pulses: 2+ bilaterally. .  Neurological: Alertness and orientation as per Constitutional exam. No evidence of gross cranial nerve deficit. Coordination appeared normal.   Skin: Skin is warm and dry. There is no rash or diaphoresis. Psychiatric: She has a normal mood and affect. Her speech is normal and behavior is normal.      MOST RECENT LABS ON RECORD:   Lab Results   Component Value Date    WBC 4.8 04/01/2022    HGB 13.4 04/01/2022    HCT 42.2 04/01/2022     04/01/2022    CHOL 167 04/16/2021    TRIG 88 04/16/2021    HDL 93 04/01/2022    ALT 22 04/01/2022    AST 28 04/01/2022     (H) 04/01/2022    K 4.6 04/01/2022     04/01/2022    CREATININE 0.78 04/01/2022    BUN 25 (H) 04/01/2022    CO2 21 04/01/2022    TSH 1.41 04/01/2022    INR 4.6 05/24/2022       ASSESSMENT:     1. PAF (paroxysmal atrial fibrillation) ([address]    2. Chronic anticoagulation    3. ASHD (arteriosclerotic heart disease)    4. S/P CABG x 2    5. S/P aortic valve and mitral valve replacement    6. Essential hypertension    7. Mixed hyperlipidemia       PLAN:        Paroxysmal Atrial Fibrillation: Currently maintaining sinus rhythm S/P surgical maze procedure in 2008. Paroxysmal atrial fibrillation noted on prior Cam monitor. · Beta Blocker: STOP Metoprolol succinate (Toprol XL) and START Carvedilol (Coreg) 12.5 mg twice daily. I also discussed the potential side effects of this medication including lightheadedness and dizziness and instructed them to stop the medication of this occurs and call our office if this occurs. In hopes to better control her blood pressure.    Stroke Risk: CHADS2-VASc Score: greater than 2 (2.2% stroke risk)  [id number] Score for Atrial Fibrillation Stroke Risk   Risk   Factors  Component Value   C CHF No 0   H HTN Yes 1   A2 Age >= 75 No,  (64 y.o.) 0   D DM No 0   S2 Prior Stroke/TIA No 0   V Vascular Disease No 0   A Age 74-69 No,  (64 y.o.) 0   Sc Sex female 1    [id number]  Score  2   Score last updated 1/6/52 7:01 PM EDT  Click here for a link to the UpToDate guideline \"Atrial Fibrillation: Anticoagulation therapy to prevent embolization  Disclaimer: Risk Score calculation is dependent on accuracy of patient problem list and past encounter diagnosis. · Anticoagulation: warfarin (Coumadin) take as directed (goal INR 2.5-3.5)      Atherosclerotic Heart Disease: S/P CABG x2 SVG to LAD and SVG to OM1 in 2008.  Antiplatelet Agent: Continue aspirin 162 mg daily.  Beta Blocker: STOP Metoprolol succinate (Toprol XL) and START Carvedilol (Coreg) 12.5 mg twice daily.  Cholesterol Reduction Therapy: Continue Atorvastatin (Lipitor) 20 mg daily.  Additional counseling: I advised them to call our office or go to the emergency room if they developed worsening or persistent chest pain or increased shortness of breath as this could be life threatening. · Mitral and aortic valve replacement: Chronic anticoagulation, Currently compensated. No evidence of volume overload. · Anticoagulation: Continue Coumadin Clinic for INR, goal INR is 2.5-3.5. · Beta Blocker: STOP Metoprolol succinate (Toprol XL) and START Carvedilol (Coreg) 12.5 mg twice daily. ACE Inibitor/ARB: INCREASE to losartan (Cozaar) 50 mg BID. I also discussed the potential side effects of this medication including lightheadedness and dizziness and instructed them to stop the medication of this occurs and call our office if this occurs. · Essential Hypertension: Uncontrolled   Beta Blocker: STOP Metoprolol succinate (Toprol XL) and START Carvedilol (Coreg) 12.5 mg twice daily.  ACE Inibitor/ARB: INCREASE to losartan (Cozaar) 50 mg BID. · Hyperlipidemia: Mixed LDL 78 mg/dL on 4/1/2022  · Cholesterol Reduction Therapy: Continue Atorvastatin (Lipitor) 20 mg daily. · Tiredness/ Fatigue: She reports she has had some increase in tiredness.  She has had no motive to do stuff this is more of an attitude thing and not her body limiting her from doing more. Follow up with PCP. Finally, I recommended that she continue her current medications and follow up with you as previously scheduled. FOLLOW UP:   I told Ms. Bry Frias to call my office if she had any problems, but otherwise I asked her to Return in about 6 months (around 11/30/2022). However, I would be happy to see her sooner should the need arise. Sincerely,  Corie Dakin, MD, F.A.C.C. [facility] Cardiology Specialist    [facility] [address]  Phone: [phone], Fax: [phone]     I believe that the risk of significant morbidity and mortality related to the patient's current medical conditions are: intermediate-high. Approximately 35 minutes were spent during prep work, discussion and exam of the patient, and follow up documentation and all of their questions were answered. The documentation recorded by the scribe, accurately and completely reflects the services I personally performed and the decisions made by me. Corie Dakin MD, F.A.C.C.  May 31, 2022

## 2022-05-31 NOTE — PATIENT INSTRUCTIONS
SURVEY:    You may be receiving a survey from [email or web address] regarding your visit today. Please complete the survey to enable us to provide the highest quality of care to you and your family. If you cannot score us a very good on any question, please call the office to discuss how we could have made your experience a very good one. Thank you. No

## 2022-06-07 ENCOUNTER — HOSPITAL ENCOUNTER (OUTPATIENT)
Dept: PHARMACY | Age: 67
Setting detail: THERAPIES SERIES
Discharge: HOME | End: 2022-06-07
Payer: COMMERCIAL

## 2022-06-07 VITALS
SYSTOLIC BLOOD PRESSURE: 146 MMHG | DIASTOLIC BLOOD PRESSURE: 74 MMHG | WEIGHT: 110 LBS | BODY MASS INDEX: 18.88 KG/M2 | HEART RATE: 67 BPM

## 2022-06-07 DIAGNOSIS — Z95.2 AORTIC VALVE REPLACED: ICD-10-CM

## 2022-06-07 DIAGNOSIS — Z95.2 S/P MVR (MITRAL VALVE REPLACEMENT): Primary | ICD-10-CM

## 2022-06-07 LAB — INR BLD: 2.7

## 2022-06-07 PROCEDURE — 85610 PROTHROMBIN TIME: CPT

## 2022-06-07 PROCEDURE — 99211 OFF/OP EST MAY X REQ PHY/QHP: CPT

## 2022-06-07 NOTE — PATIENT INSTRUCTIONS
Continue to monitor urine and stool. Continue to monitor for signs of bleeding. Return to clinic in 4 weeks. Continue warfarin  5 mg po every MWF and half tablet for 2.5 mg all other days.

## 2022-06-07 NOTE — PROGRESS NOTES
[facility] [address] [place] [address]/[place]  Medication Management  ANTICOAGULATION    Referring Provider: Augustina Meraz     GOAL INR: 2.5-3.5     TODAY'S INR: 2.7     WARFARIN Dosage: Continue warfarin  5 mg po every MWF and half tablet for 2.5 mg all other days. INR (no units)   Date Value   2022 4.6   2022 3.2   2022 3   2022 2.9   2021 3.2   10/12/2021 3.4   2021 3.2       Medication changes:  Increased Losartan from daily to twice daily  Started Coreg 12.5 mg twice daily     Notes:    Fingerstick INR drawn per clinic protocol. Patient states no visible blood in urine and no black tarry stool. Denies any missed doses of warfarin. No change in other maintenance medications or in diet. Will recheck INR in 4 weeks. Patient acknowledges working in consult agreement with pharmacist as referred by his/her physician.                   For Pharmacy Admin Tracking Only     Intervention Detail: Adherence Monitorin and New Rx: 2, reason: Needs Additional Therapy   Total # of Interventions Recommended: 2   Total # of Interventions Accepted: 2   Time Spent (min): [facility], [address] [place] [address], PharmD

## 2022-06-08 ENCOUNTER — TELEPHONE (OUTPATIENT)
Dept: CARDIOLOGY | Age: 67
End: 2022-06-08

## 2022-06-08 DIAGNOSIS — I10 ESSENTIAL HYPERTENSION: ICD-10-CM

## 2022-06-08 DIAGNOSIS — I25.10 ASHD (ARTERIOSCLEROTIC HEART DISEASE): ICD-10-CM

## 2022-06-08 DIAGNOSIS — I48.0 PAF (PAROXYSMAL ATRIAL FIBRILLATION) (HCC): Primary | ICD-10-CM

## 2022-06-08 DIAGNOSIS — Z95.1 S/P CABG X 2: ICD-10-CM

## 2022-06-08 NOTE — TELEPHONE ENCOUNTER
[address] VIRGIE Lawrence called into the office and said that we would need to do a prior authorization for Ms. Vega Losartan since she is taking it 50 mg BID. However they said if we could change her to Losartan 100 mg daily we would not need to do a prior authorization. Please advise. Thanks!

## 2022-06-09 RX ORDER — LOSARTAN POTASSIUM 100 MG/1
100 TABLET ORAL DAILY
Qty: 90 TABLET | Refills: 3 | Status: SHIPPED | OUTPATIENT
Start: 2022-06-09 | End: 2022-09-19

## 2022-06-09 NOTE — TELEPHONE ENCOUNTER
Please send them a prescription for losartan 100 mg daily. There is no need to do prior authorization for the same dose to be taken twice daily.   Thank you

## 2022-06-22 ENCOUNTER — TELEPHONE (OUTPATIENT)
Dept: CARDIOLOGY | Age: 67
End: 2022-06-22

## 2022-06-22 DIAGNOSIS — I10 ESSENTIAL HYPERTENSION: ICD-10-CM

## 2022-06-22 DIAGNOSIS — I48.0 PAF (PAROXYSMAL ATRIAL FIBRILLATION) (HCC): ICD-10-CM

## 2022-06-22 DIAGNOSIS — Z95.1 S/P CABG X 2: ICD-10-CM

## 2022-06-22 DIAGNOSIS — E78.2 MIXED HYPERLIPIDEMIA: ICD-10-CM

## 2022-06-22 DIAGNOSIS — I25.10 ASHD (ARTERIOSCLEROTIC HEART DISEASE): ICD-10-CM

## 2022-06-22 DIAGNOSIS — Z79.01 CHRONIC ANTICOAGULATION: ICD-10-CM

## 2022-06-22 DIAGNOSIS — Z95.2 S/P AORTIC VALVE AND MITRAL VALVE REPLACEMENT: ICD-10-CM

## 2022-06-22 NOTE — TELEPHONE ENCOUNTER
Called Ms. Vega per Dr Shweta Parker he would like her to decrease Coreg to 6.25 BID. Called Ms. Vanessa Mason she verbalized understanding 93

## 2022-06-24 RX ORDER — CARVEDILOL 6.25 MG/1
6.25 TABLET ORAL 2 TIMES DAILY WITH MEALS
Qty: 180 TABLET | Refills: 3 | Status: SHIPPED | OUTPATIENT
Start: 2022-06-24

## 2022-07-05 ENCOUNTER — HOSPITAL ENCOUNTER (OUTPATIENT)
Dept: PHARMACY | Age: 67
Setting detail: THERAPIES SERIES
Discharge: HOME | End: 2022-07-05
Payer: MEDICARE

## 2022-07-05 VITALS
DIASTOLIC BLOOD PRESSURE: 78 MMHG | WEIGHT: 109.2 LBS | BODY MASS INDEX: 18.74 KG/M2 | HEART RATE: 42 BPM | SYSTOLIC BLOOD PRESSURE: 152 MMHG

## 2022-07-05 DIAGNOSIS — Z95.2 S/P MVR (MITRAL VALVE REPLACEMENT): Primary | ICD-10-CM

## 2022-07-05 DIAGNOSIS — Z95.2 AORTIC VALVE REPLACED: ICD-10-CM

## 2022-07-05 DIAGNOSIS — I48.91 ATRIAL FIBRILLATION, UNSPECIFIED TYPE (HCC): ICD-10-CM

## 2022-07-05 LAB — INR BLD: 3.1

## 2022-07-05 PROCEDURE — 99211 OFF/OP EST MAY X REQ PHY/QHP: CPT | Performed by: FAMILY MEDICINE

## 2022-07-05 PROCEDURE — 85610 PROTHROMBIN TIME: CPT | Performed by: FAMILY MEDICINE

## 2022-07-05 RX ORDER — MULTIVITAMIN WITH IRON
250 TABLET ORAL DAILY
COMMUNITY

## 2022-07-05 NOTE — PROGRESS NOTES
[facility] [address] [place] [address]/[place]  Medication Management  ANTICOAGULATION    Referring Provider: Dr Rowan Chin INR: 2.5-3.5    TODAY'S INR: 3.1    WARFARIN Dosage: continue 5mg MWF, 2.5mg all other days    INR (no units)   Date Value   2022 3.1   2022 2.7   2022 4.6   2022 3.2   2022 3   2022 2.9   2021 3.2       Medication changes:  No changes  Notes:    Fingerstick INR drawn per clinic protocol. Patient states no visible blood in urine and no black tarry stool. Denies any missed doses of warfarin. No change in other maintenance medications or in diet. Will recheck INR in 6 weeks. Patient acknowledges working in consult agreement with pharmacist as referred by his/her physician.                   For Pharmacy Admin Tracking Only     Intervention Detail: Adherence Monitorin   Total # of Interventions Recommended: 2   Total # of Interventions Accepted: 2   Time Spent (min): 20      MAMIE Hernandes.Ph., 2022,3:35 PM

## 2022-07-05 NOTE — PATIENT INSTRUCTIONS
Continue to take warfarin 5mg (1 tablet) on Monday, Wednesday and Friday and warfarin 2.5mg (1/2 tablet) on all other days. Continue to monitor for signs of bleeding. Return to coumadin clinic in 6 weeks.

## 2022-08-16 ENCOUNTER — HOSPITAL ENCOUNTER (OUTPATIENT)
Dept: PHARMACY | Age: 67
Setting detail: THERAPIES SERIES
Discharge: HOME | End: 2022-08-16
Payer: MEDICARE

## 2022-08-16 VITALS
SYSTOLIC BLOOD PRESSURE: 148 MMHG | BODY MASS INDEX: 18.54 KG/M2 | HEART RATE: 87 BPM | WEIGHT: 108 LBS | DIASTOLIC BLOOD PRESSURE: 78 MMHG

## 2022-08-16 DIAGNOSIS — Z95.2 S/P MVR (MITRAL VALVE REPLACEMENT): Primary | ICD-10-CM

## 2022-08-16 DIAGNOSIS — Z95.2 AORTIC VALVE REPLACED: ICD-10-CM

## 2022-08-16 LAB — INR BLD: 2.1

## 2022-08-16 PROCEDURE — 99211 OFF/OP EST MAY X REQ PHY/QHP: CPT

## 2022-08-16 PROCEDURE — 85610 PROTHROMBIN TIME: CPT

## 2022-08-16 NOTE — PROGRESS NOTES
[facility] [address] [place] [address]/[place]  Medication Management  ANTICOAGULATION    Referring Provider: Dr. Erwin Jorgensen INR: 2.5-3.5    TODAY'S INR: 2.1    WARFARIN Dosage: Warfarin 5 mg po every MWF; 2.5 mg po all other days  Patient will take warfarin 5 mg po today for 1 dose    INR (no units)   Date Value   2022 2.1   2022 3.1   2022 2.7   2022 4.6   2022 3.2   2022 3   2022 2.9       Medication changes:  No changes  Notes:    Fingerstick INR drawn per clinic protocol. Patient states no visible blood in urine and no black tarry stool. Denies any missed doses of warfarin. No change in other maintenance medications. Will recheck INR in 3 weeks. Patient acknowledges working in consult agreement with pharmacist as referred by his/her physician. Patient has been eating more greens recently which may decrease INR. For Pharmacy Admin Tracking Only    Intervention Detail: Adherence Monitorin and Dose Adjustment: 1, reason: Improve Adherence, Therapy Optimization  Total # of Interventions Recommended: 2  Total # of Interventions Accepted: 2  Time Spent (min): SAMANTHA/ [address]  Shanthi Hughes, [facility]

## 2022-08-16 NOTE — PATIENT INSTRUCTIONS
Please take warfarin 5 mg today. Continue current dose of warfarin as instructed on dosing calendar provided. Continue to monitor urine and stool for signs and symptoms of bleeding. Return to clinic in 3 weeks. Please notify the clinic of any medication changes. Please remember to bring all medications (both prescription and OTC) to your next visit. Kindly notify the clinic if you are unable to make to your next appointment.

## 2022-09-06 ENCOUNTER — HOSPITAL ENCOUNTER (OUTPATIENT)
Dept: PHARMACY | Age: 67
Setting detail: THERAPIES SERIES
Discharge: HOME | End: 2022-09-06
Payer: MEDICARE

## 2022-09-06 VITALS
WEIGHT: 110 LBS | BODY MASS INDEX: 18.88 KG/M2 | DIASTOLIC BLOOD PRESSURE: 73 MMHG | SYSTOLIC BLOOD PRESSURE: 165 MMHG | HEART RATE: 73 BPM

## 2022-09-06 DIAGNOSIS — Z95.2 AORTIC VALVE REPLACED: ICD-10-CM

## 2022-09-06 DIAGNOSIS — Z95.2 S/P MVR (MITRAL VALVE REPLACEMENT): Primary | ICD-10-CM

## 2022-09-06 LAB — INR BLD: 2.1

## 2022-09-06 PROCEDURE — 85610 PROTHROMBIN TIME: CPT

## 2022-09-06 PROCEDURE — 99212 OFFICE O/P EST SF 10 MIN: CPT

## 2022-09-06 NOTE — PATIENT INSTRUCTIONS
Continue to monitor urine and stool. Continue to monitor for signs of bleeding. Return to clinic in 3 weeks. Take warfarin 5 mg today then continue warfarin 5 mg MWF and half tablet for 2.5 mg all other days.

## 2022-09-06 NOTE — PROGRESS NOTES
[facility]/[place]  Medication Management  ANTICOAGULATION    Referring Provider: Trenton Yanez INR: 2.5-3.5     TODAY'S INR: 2.1     WARFARIN Dosage: Take warfarin 5 mg today then continue warfarin 5 mg MWF and half tablet for 2.5 mg all other days. INR (no units)   Date Value   2022 2.1   2022 3.1   2022 2.7   2022 4.6   2022 3.2   2022 3   2022 2.9       Medication changes:  None    Notes:    Fingerstick INR drawn per clinic protocol. Patient states no visible blood in urine and no black tarry stool. Denies any missed doses of warfarin. No change in other maintenance medications or in diet. Will recheck INR in 3 weeks. Patient acknowledges working in consult agreement with pharmacist as referred by his/her physician. Patient states her diet changes with seasons so she is eating salads now but diet will change again soon with fall.                For Pharmacy Admin Tracking Only    Intervention Detail: Adherence Monitorin and Dose Adjustment: 1, reason: Therapy Optimization  Total # of Interventions Recommended: 1  Total # of Interventions Accepted: 1  Time Spent (min):  Raul Yepez [facility] [place], PharmD

## 2022-09-19 DIAGNOSIS — I25.10 ASHD (ARTERIOSCLEROTIC HEART DISEASE): ICD-10-CM

## 2022-09-19 DIAGNOSIS — Z95.1 S/P CABG X 2: ICD-10-CM

## 2022-09-19 DIAGNOSIS — I48.0 PAF (PAROXYSMAL ATRIAL FIBRILLATION) (HCC): ICD-10-CM

## 2022-09-19 DIAGNOSIS — I10 ESSENTIAL HYPERTENSION: ICD-10-CM

## 2022-09-19 RX ORDER — LOSARTAN POTASSIUM 50 MG/1
50 TABLET ORAL 2 TIMES DAILY
Qty: 90 TABLET | Refills: 3 | Status: SHIPPED | OUTPATIENT
Start: 2022-09-19 | End: 2022-09-20 | Stop reason: DRUGHIGH

## 2022-09-19 NOTE — TELEPHONE ENCOUNTER
Health Maintenance   Topic Date Due    Pneumococcal 65+ years Vaccine (1 - PCV) Never done    Depression Monitoring  Never done    HIV screen  Never done    Hepatitis C screen  Never done    Cervical cancer screen  Never done    Colorectal Cancer Screen  Never done    Shingles vaccine (1 of 2) Never done    DEXA (modify frequency per FRAX score)  Never done    Breast cancer screen  01/31/2019    COVID-19 Vaccine (4 - Booster for Morgan Batch series) 03/15/2022    Annual Wellness Visit (AWV)  Never done    Flu vaccine (1) 09/01/2022    Lipids  04/01/2023    DTaP/Tdap/Td vaccine (2 - Td or Tdap) 05/15/2024    Hepatitis A vaccine  Aged Out    Hepatitis B vaccine  Aged Out    Hib vaccine  Aged Out    Meningococcal (ACWY) vaccine  Aged Out             (applicable per patient's age: Cancer Screenings, Depression Screening, Fall Risk Screening, Immunizations)    LDL Cholesterol (mg/dL)   Date Value   04/01/2022 78     AST (U/L)   Date Value   04/01/2022 28     ALT (U/L)   Date Value   04/01/2022 22     BUN (mg/dL)   Date Value   04/01/2022 25 (H)      (goal A1C is < 7)   (goal LDL is <100) need 30-50% reduction from baseline     BP Readings from Last 3 Encounters:   09/06/22 (!) 165/73   08/16/22 (!) 148/78   07/05/22 (!) 152/78    (goal /80)      All Future Testing planned in [facility]:  Lab Frequency Next Occurrence   TAI DANICA DIGITAL SCREEN BILATERAL Once 11/03/2021       Next Visit Date:  Future Appointments   Date Time Provider Samia Ambrose   9/27/2022 11:20 AM [address] MED MG[facility] [place]   10/10/2022  1:40 PM Jayda Healy MD [facility] C[facility]            Patient Active Problem List:     Skin lesion of face (actinic keratosis)     Postmenopausal     Overactive bladder     Rheumatic heart disease     Sternocleidomastoid muscle tenderness     Left shoulder pain     Depression     Neck pain     Bilateral shoulder pain     Chronic pain     Neuropathy     S/P MVR (mitral valve replacement) Aortic valve replaced     A-fib ([address]

## 2022-09-20 RX ORDER — LOSARTAN POTASSIUM 50 MG/1
50 TABLET ORAL 2 TIMES DAILY
COMMUNITY
End: 2022-09-21 | Stop reason: SDUPTHER

## 2022-09-21 RX ORDER — LOSARTAN POTASSIUM 50 MG/1
50 TABLET ORAL 2 TIMES DAILY
Qty: 180 TABLET | Refills: 3 | Status: SHIPPED | OUTPATIENT
Start: 2022-09-21

## 2022-09-27 ENCOUNTER — HOSPITAL ENCOUNTER (OUTPATIENT)
Dept: PHARMACY | Age: 67
Setting detail: THERAPIES SERIES
Discharge: HOME | End: 2022-09-27
Payer: MEDICARE

## 2022-09-27 VITALS
WEIGHT: 109.4 LBS | DIASTOLIC BLOOD PRESSURE: 63 MMHG | SYSTOLIC BLOOD PRESSURE: 168 MMHG | HEART RATE: 44 BPM | BODY MASS INDEX: 18.78 KG/M2

## 2022-09-27 DIAGNOSIS — Z95.2 S/P MVR (MITRAL VALVE REPLACEMENT): Primary | ICD-10-CM

## 2022-09-27 DIAGNOSIS — I48.91 ATRIAL FIBRILLATION, UNSPECIFIED TYPE (HCC): ICD-10-CM

## 2022-09-27 DIAGNOSIS — Z95.2 AORTIC VALVE REPLACED: ICD-10-CM

## 2022-09-27 LAB — INR BLD: 2.3

## 2022-09-27 PROCEDURE — 99212 OFFICE O/P EST SF 10 MIN: CPT | Performed by: FAMILY MEDICINE

## 2022-09-27 PROCEDURE — 85610 PROTHROMBIN TIME: CPT | Performed by: FAMILY MEDICINE

## 2022-09-27 NOTE — PATIENT INSTRUCTIONS
Please take 5mg (1 tablet) today then increase your dosing to take 1/2 tablet (2.5mg) on Tuesday, Thursday and Saturday and 1 tablet (5mg) all other days. Continue to monitor for signs of bleeding. Return to coumadin clinic in 2 weeks.

## 2022-10-10 ENCOUNTER — HOSPITAL ENCOUNTER (OUTPATIENT)
Dept: NON INVASIVE DIAGNOSTICS | Age: 67
Discharge: HOME | End: 2022-10-10
Payer: MEDICARE

## 2022-10-10 ENCOUNTER — HOSPITAL ENCOUNTER (OUTPATIENT)
Age: 67
Discharge: HOME | End: 2022-10-10
Payer: MEDICARE

## 2022-10-10 ENCOUNTER — OFFICE VISIT (OUTPATIENT)
Dept: CARDIOLOGY | Age: 67
End: 2022-10-10
Payer: MEDICARE

## 2022-10-10 ENCOUNTER — HOSPITAL ENCOUNTER (OUTPATIENT)
Dept: PHARMACY | Age: 67
Setting detail: THERAPIES SERIES
Discharge: HOME | End: 2022-10-10
Payer: MEDICARE

## 2022-10-10 VITALS
WEIGHT: 108 LBS | SYSTOLIC BLOOD PRESSURE: 138 MMHG | BODY MASS INDEX: 18.54 KG/M2 | HEART RATE: 89 BPM | DIASTOLIC BLOOD PRESSURE: 84 MMHG

## 2022-10-10 VITALS
SYSTOLIC BLOOD PRESSURE: 127 MMHG | OXYGEN SATURATION: 99 % | HEART RATE: 88 BPM | BODY MASS INDEX: 18.44 KG/M2 | WEIGHT: 108 LBS | HEIGHT: 64 IN | DIASTOLIC BLOOD PRESSURE: 78 MMHG | RESPIRATION RATE: 18 BRPM

## 2022-10-10 DIAGNOSIS — I48.0 PAF (PAROXYSMAL ATRIAL FIBRILLATION) (HCC): ICD-10-CM

## 2022-10-10 DIAGNOSIS — Z95.2 S/P AORTIC VALVE AND MITRAL VALVE REPLACEMENT: ICD-10-CM

## 2022-10-10 DIAGNOSIS — I25.10 ASHD (ARTERIOSCLEROTIC HEART DISEASE): ICD-10-CM

## 2022-10-10 DIAGNOSIS — Z95.2 AORTIC VALVE REPLACED: ICD-10-CM

## 2022-10-10 DIAGNOSIS — E78.2 MIXED HYPERLIPIDEMIA: ICD-10-CM

## 2022-10-10 DIAGNOSIS — I25.10 ASHD (ARTERIOSCLEROTIC HEART DISEASE): Primary | ICD-10-CM

## 2022-10-10 DIAGNOSIS — Z95.2 S/P MVR (MITRAL VALVE REPLACEMENT): Primary | ICD-10-CM

## 2022-10-10 DIAGNOSIS — Z95.1 S/P CABG X 2: ICD-10-CM

## 2022-10-10 DIAGNOSIS — Z79.01 CHRONIC ANTICOAGULATION: ICD-10-CM

## 2022-10-10 LAB
ANION GAP SERPL CALCULATED.3IONS-SCNC: 11 MMOL/L (ref 9–17)
BUN BLDV-MCNC: 21 MG/DL (ref 8–23)
BUN/CREAT BLD: 24 (ref 9–20)
CALCIUM SERPL-MCNC: 10.5 MG/DL (ref 8.6–10.4)
CHLORIDE BLD-SCNC: 104 MMOL/L (ref 98–107)
CO2: 26 MMOL/L (ref 20–31)
CREAT SERPL-MCNC: 0.86 MG/DL (ref 0.5–0.9)
GFR SERPL CREATININE-BSD FRML MDRD: >60 ML/MIN/1.73M2
GLUCOSE BLD-MCNC: 103 MG/DL (ref 70–99)
HCT VFR BLD CALC: 43.2 % (ref 36.3–47.1)
HEMOGLOBIN: 14.1 G/DL (ref 11.9–15.1)
INR BLD: 3
MCH RBC QN AUTO: 32.6 PG (ref 25.2–33.5)
MCHC RBC AUTO-ENTMCNC: 32.6 G/DL (ref 28.4–34.8)
MCV RBC AUTO: 99.8 FL (ref 82.6–102.9)
NRBC AUTOMATED: 0 PER 100 WBC
PDW BLD-RTO: 12.7 % (ref 11.8–14.4)
PLATELET # BLD: 124 K/UL (ref 138–453)
PMV BLD AUTO: 11 FL (ref 8.1–13.5)
POTASSIUM SERPL-SCNC: 4.5 MMOL/L (ref 3.7–5.3)
RBC # BLD: 4.33 M/UL (ref 3.95–5.11)
SODIUM BLD-SCNC: 141 MMOL/L (ref 135–144)
WBC # BLD: 4.6 K/UL (ref 3.5–11.3)

## 2022-10-10 PROCEDURE — 93247 EXT ECG>7D<15D SCAN A/R: CPT

## 2022-10-10 PROCEDURE — 36415 COLL VENOUS BLD VENIPUNCTURE: CPT

## 2022-10-10 PROCEDURE — 85027 COMPLETE CBC AUTOMATED: CPT

## 2022-10-10 PROCEDURE — 99214 OFFICE O/P EST MOD 30 MIN: CPT | Performed by: INTERNAL MEDICINE

## 2022-10-10 PROCEDURE — 80048 BASIC METABOLIC PNL TOTAL CA: CPT

## 2022-10-10 PROCEDURE — 93246 EXT ECG>7D<15D RECORDING: CPT

## 2022-10-10 PROCEDURE — 80061 LIPID PANEL: CPT

## 2022-10-10 PROCEDURE — 85610 PROTHROMBIN TIME: CPT

## 2022-10-10 PROCEDURE — 99211 OFF/OP EST MAY X REQ PHY/QHP: CPT

## 2022-10-10 PROCEDURE — 1123F ACP DISCUSS/DSCN MKR DOCD: CPT | Performed by: INTERNAL MEDICINE

## 2022-10-10 NOTE — PATIENT INSTRUCTIONS
SURVEY:    You may be receiving a survey from [employer] regarding your visit today. Please complete the survey to enable us to provide the highest quality of care to you and your family. If you cannot score us a very good on any question, please call the office to discuss how we could have made your experience a very good one. Thank you.

## 2022-10-10 NOTE — PATIENT INSTRUCTIONS
Continue to monitor urine and stool. Continue to monitor for signs of bleeding. Return to clinic in 6 weeks. Continue warfarin 5 mg MWF and half tablet for 2.5 mg all other days.

## 2022-10-10 NOTE — PROGRESS NOTES
Marcelina Rome am scribing for and in the presence of Carol Clark MD, F.A.C.C..    Patient: Frutoso Romberg  : 1955  Date of Visit: October 10, 2022    REASON FOR VISIT / CONSULTATION: Follow-up (HX: PAF, CAD,S/P CABGx2, HTN, HLD. Pt is here for a 6 month follow up. She says she has palpitations when she does stuff with her arms and wit physical activity, sometimes it gets so bad she has to go sit down. She has CP sometimes about twice a week feels sharp lasts a couple seconds. Light headed/dizziness sometimes. She also has to breath harder when she has these palpitation episodes. )      History of Present Illness:        Dear Edson Goodson,    I had the pleasure of seeing Frutoso Romberg in my office today. Ms. Luis Gordon is a 74 y.o. female who presented for follow up. She has extensive cardiac history. History of rheumatic fever diagnosed in . She had ischemic colitis back in , this was probably embolic from atrial fibrillation. History of open heart surgery with 2 vessel bypass (SVG to LAD and SVG to OM1), aortic valve replacement with 19 mm Saint Zhao mechanical valve, mitral valve replacement with 27 mm Saint Zhao mechanical valve and maze procedure on 2008. Aortic valve replacement with 21 Amy-Barnett aortic valve and aortic root replacement with bovine patch in addition to tricuspid valve repair in     She has been maintained on warfarin since her episode of ischemic colitis and Aortic/mitral valve replacement with no bleeding complications. She told me that she has never been taking statin therapy. I looked back in the note from Dr. Aldo Rose, the only thing I saw was fish oil. Patient had CABGx2 at the time of her mitral and aortic valve replacement. She has history of hypertension. Denied history of diabetes or dyslipidemia. Other relevant medical history includes migraine headaches and anxiety.   She said she stopped taking Paxil because it made her gaining weight. CAM monitor done on 8/6/2020- 3 days and 1 hour recorded. Baseline rhythm is sinus with average heart rate of 71 bpm ranging between 42 and 132 bpm. Sinus tachycardia represented 3% of the study distribution. Occasional APCs, 3% with multiple runs of what appeared to be ectopic atrial tachycardia. The longest lasted for 2.5 hours at a heart rate of 174 bpm. These episodes usually terminated into sinus pauses followed by junctional escape beats and sinus bradycardia. A total of 12 pauses more than 2.5 seconds, the longest being 3.0 seconds. Some of these were conversion pauses and happening during typical daytime hours. Occasional PVCs (3%) with 2 runs of wide QRS tachycardia. The longest was 5 beats at a heart rate of 134 bpm.  Cannot exclude supraventricular tachycardia with aberrant conduction. We offered her an ablation procedure after getting the CAM monitor but patient refused to do this during the pandemic. EKG done in office on (4/27/2022): Showed normal sinus rhythm no ischemic changes from prior. CAM done on 4/27/2022: 6 days and 19 hours recorded. Baseline rhythm is sinus with average heart rate of 65 bpm, ranging between 38 and 79 bpm. Severe bradycardia represented less than 1% of the study duration. 67 pauses recorded, the longest was 3.1 seconds. Some of these pauses are compensatory pauses following isolated PVCs. Very frequent short episodes of atrial tachycardia, the longest lasted for 31 beats at an average heart rate of 151 bpm. Occasional PVCs. PVC burden 2.19% with multiple bigeminy and ventricular couplets. No ventricular runs. Further work-up, including but not limited to pacemaker. Therapy may be needed as clinically indicated. Tilt Table Test done on 5/12/2022: Abnormal head upright tilt table study. The patient's heart rate, blood  pressure response and symptoms were most consistent with dysautonomia.     Echo done on 5/12/2022: Global left ventricular systolic function appears preserved with an estimated ejection fraction of 60%. The left ventricular cavity size is within normal limits and the left ventricular wall thickness is within normal limits. The inferoseptal wall is abnormal in its motion which is not unusual status post open heart surgery. S/P Aortic valve replacement with 21 mm CE valve, mean gradient across the aortic valve is 5 mmHg. No significant aortic regurgitation. S/P mechanical MVR with a mean gradient of 6.3 mmHg. No significant mitral regurgitation. S/P tricuspid valve repair with a mean gradient of 3 mmHg. Mild to moderate tricuspid regurgitation. Mild pulmonary hypertension with an estimated right ventricular systolic pressure of 35 mmHg. Evidence of moderate diastolic dysfunction is seen. Compared to the previous study of 4/16/2021, no significant change was seen. Ms. Amalia Hui is here today for a six month follow up. She says she is just doing okay. She says she has palpitations when she does physical activity and when she does stuff with her arms and sometimes she has to stop and sit down. She does have some light headed/dizziness. No syncopal episodes but she has had one near syncopal episodes. No blood in her urine or stool. She does have chest pains about twice a week, they feel sharp and last a couple of seconds. She has shortness of breath when she has this palpitation episodes. She has been very busy in the yard with mowing and everything. She does have a riding mower and it takes her about three hours a to do. She can also pull weeds. She is able to do her own grocery shopping with no significant external symptoms. She does feel some chest discomfort however she can relate this to stress. It does not radiate any where. She has had this pain since her  passed away. She is not drinking enough fluids she feels. She does drink diet 7 up most of the time. She has no issues sleeping at night. No fever or cough.  No abdominal pain, nausea or vomiting. No problem moving her bowel. No bleeding complications from taking aspirin and warfarin. Weight is stable. No change in her appetite. Bleeding Risks: Ms. Amalia Hui denies any current or recent bleeding problems including a history of a GI bleed, ulcers, recent or upcoming surgeries, blood in her stool or black tarry stools or blood in her urine. PAST MEDICAL HISTORY:         Past Medical History:   Diagnosis Date    Atrial fibrillation ([facility]     CAD (coronary artery disease)     CHF (congestive heart failure) ([facility])     Congestive heart failure (CHF) ([address]     Depression 2015    H/O mitral valve replacement     H/O tricuspid valve repair     Hypertension     Ischemic colitis ([facility] ,     Migraine     Migraine     Migraine     PAF (paroxysmal atrial fibrillation) ([facility])     Rheumatic fever     S/P AVR     SOB (shortness of breath)        CURRENT ALLERGIES: Patient has no known allergies. REVIEW OF SYSTEMS: 14 systems were reviewed. Pertinent positives and negatives as above, all else negative.      Past Surgical History:   Procedure Laterality Date    AORTIC VALVE REPLACEMENT      CARDIAC CATHETERIZATION      CARDIAC VALVE REPLACEMENT      aortic and mitral    CHOLECYSTECTOMY      COLONOSCOPY      MITRAL VALVE REPLACEMENT      OVARIAN CYST REMOVAL Right     PRE-MALIGNANT / BENIGN SKIN LESION EXCISION Left 13    face    TONSILLECTOMY AND ADENOIDECTOMY      TRANSESOPHAGEAL ECHOCARDIOGRAM  2017    TUBAL LIGATION      Social History:  Social History     Tobacco Use    Smoking status: Former     Packs/day: 0.50     Years: 20.00     Pack years: 10.00     Types: Cigarettes     Quit date: 3/28/1993     Years since quittin.5    Smokeless tobacco: Never   Substance Use Topics    Alcohol use: No    Drug use: No        CURRENT MEDICATIONS:        Outpatient Medications Marked as Taking for the 10/10/22 encounter (Office Visit) with Dori Wyman Ariel Asif MD   Medication Sig Dispense Refill    losartan (COZAAR) 50 MG tablet Take 1 tablet by mouth in the morning and at bedtime 180 tablet 3    magnesium (MAGNESIUM-OXIDE) 250 MG TABS tablet Take 250 mg by mouth daily      carvedilol (COREG) 6.25 MG tablet Take 1 tablet by mouth 2 times daily (with meals) 180 tablet 3    atorvastatin (LIPITOR) 20 MG tablet Take 1 tablet by mouth once daily 90 tablet 3    warfarin (COUMADIN) 5 MG tablet Take 1 tablet by mouth See Admin Instructions Coumadin Clinic:  2.5 mg every Tuesday Thursday Saturday;  5 mg all other days (Patient taking differently: Take 5 mg by mouth See Admin Instructions Coumadin Clinic:  5mg MWF, 2.5mg all other days) 90 tablet 3    Biotin 23762 MCG TABS Take 1 tablet by mouth daily      aspirin-acetaminophen-caffeine (EXCEDRIN MIGRAINE) 250-250-65 MG per tablet Take 1 tablet by mouth every 6 hours as needed for Headaches      oxybutynin (DITROPAN-XL) 10 MG extended release tablet Take 10 mg by mouth daily Uses PRN      Multiple Vitamins-Minerals (THERAPEUTIC MULTIVITAMIN-MINERALS) tablet Take 1 tablet by mouth daily      omeprazole (PRILOSEC) 20 MG capsule Take 1 capsule by mouth Daily 30 capsule 3    aspirin 81 MG tablet Take 162 mg by mouth daily          FAMILY HISTORY: family history includes Cancer (age of onset: 40) in her mother; Cancer (age of onset: 61) in her sister; Diabetes in her father. Physical Examination:     /78 (Site: Left Upper Arm, Position: Sitting, Cuff Size: Medium Adult)   Pulse 88   Resp 18   Ht 5' 4\" (1.626 m)   Wt 108 lb (49 kg)   SpO2 99%   BMI 18.54 kg/m²  Body mass index is 18.54 kg/m². Constitutional: She appeared oriented to person and place. She appears well-developed and well-nourished. In no acute distress. HEENT: Normocephalic and atraumatic. No JVD present. Carotid bruit is not present. No mass and no thyromegaly present. No lymphadenopathy noted.   Cardiovascular: Normal rate, regular rhythm, mechanical S1. Loud S2. Short ejection systolic murmur heard at the second left intercostal space without radiation. Pulmonary/Chest: Effort normal and breath sounds normal. No respiratory distress. She has no wheezes, rhonchi or rales. Abdominal: Soft, non-tender. She exhibits no organomegaly, mass or bruit. Extremities: No edema. No cyanosis or clubbing. 2+ radial and carotid pulses. Distal extremity pulses: 2+ bilaterally. .  Neurological: Alertness and orientation as per Constitutional exam. No evidence of gross cranial nerve deficit. Coordination appeared normal.   Skin: Skin is warm and dry. There is no rash or diaphoresis. Psychiatric: She has a normal mood and affect. Her speech is normal and behavior is normal.      MOST RECENT LABS ON RECORD:   Lab Results   Component Value Date    WBC 4.8 04/01/2022    HGB 13.4 04/01/2022    HCT 42.2 04/01/2022     04/01/2022    CHOL 167 04/16/2021    TRIG 88 04/16/2021    HDL 93 04/01/2022    ALT 22 04/01/2022    AST 28 04/01/2022     (H) 04/01/2022    K 4.6 04/01/2022     04/01/2022    CREATININE 0.78 04/01/2022    BUN 25 (H) 04/01/2022    CO2 21 04/01/2022    TSH 1.41 04/01/2022    INR 3 10/10/2022       ASSESSMENT:     1. ASHD (arteriosclerotic heart disease)    2. S/P aortic valve and mitral valve replacement    3. S/P CABG x 2    4. Chronic anticoagulation    5. Mixed hyperlipidemia    6. PAF (paroxysmal atrial fibrillation) ([facility])       PLAN:        Paroxysmal Atrial Fibrillation: Currently maintaining sinus rhythm S/P surgical maze procedure in 2008. Paroxysmal atrial fibrillation noted on prior Cam monitor. Worsening palpitations. Beta Blocker: Continue Carvedilol (Coreg) 6.25 mg twice daily.     Stroke Risk: CHADS2-VASc Score: greater than 2 (2.2% stroke risk)  [id number] Score for Atrial Fibrillation Stroke Risk   Risk   Factors  Component Value   C CHF No 0   H HTN Yes 1   A2 Age >= 75 No,  (74 y.o.) 0   D DM No 0   S2 Prior Stroke/TIA No 0   V Vascular Disease No 0   A Age 74-69 Yes,  (68 y.o.) 1   Sc Sex female 1    EVG8XZ1-ADMg  Score  3   Score last updated 7/6/81 7:56 PM EDT  Click here for a link to the UpToDate guideline \"Atrial Fibrillation: Anticoagulation therapy to prevent embolization  Disclaimer: Risk Score calculation is dependent on accuracy of patient problem list and past encounter diagnosis. Anticoagulation: warfarin (Coumadin) take as directed (goal INR 2.5-3.5)   I will get a repeat CAM monitor for 2 weeks to assess her atrial fibrillation and her offset pauses. We discussed the option of ablation and even pacemaker therapy if indicated. I ordered CBC and basal metabolic panel to be done. Atherosclerotic Heart Disease: S/P CABG x2 SVG to LAD and SVG to OM1 in 2008. No chest pain, pressure or tightness. No worsening shortness of breath. Antiplatelet Agent: Continue aspirin 162 mg daily. Beta Blocker: Continue Carvedilol (Coreg) 6.25 mg twice daily. Cholesterol Reduction Therapy: Continue Atorvastatin (Lipitor) 20 mg daily. Additional counseling: I advised them to call our office or go to the emergency room if they developed worsening or persistent chest pain or increased shortness of breath as this could be life threatening. Mitral and aortic valve replacement: Chronic anticoagulation, Currently compensated. No evidence of volume overload. Anticoagulation: Continue Coumadin Clinic for INR, goal INR is 2.5-3.5. Beta Blocker: Continue Carvedilol (Coreg) 6.25 mg twice daily. ACE Inibitor/ARB: Continue losartan (Cozaar) 50 mg BID. I also discussed the potential side effects of this medication including lightheadedness and dizziness and instructed them to stop the medication of this occurs and call our office if this occurs. Essential Hypertension: Controlled  Beta Blocker: Continue Carvedilol (Coreg) 6.25 mg twice daily. ACE Inibitor/ARB: Continue losartan (Cozaar) 50 mg BID. Hyperlipidemia: Mixed LDL 78 mg/dL on 4/1/2022  Cholesterol Reduction Therapy: Continue Atorvastatin (Lipitor) 20 mg daily. Follow-up repeat lipid panel. Finally, I recommended that she continue her current medications and follow up with you as previously scheduled. FOLLOW UP:   I told Ms. Goetz Ran to call my office if she had any problems, but otherwise I asked her to Return in about 6 months (around 4/10/2023). However, I would be happy to see her sooner should the need arise. Sincerely,  Abdoulaye Higgins MD, F.A.C.C. [facility] Cardiology Specialist    [address] [place] [address]  Phone: [phone], Fax: [phone]     I believe that the risk of significant morbidity and mortality related to the patient's current medical conditions are: intermediate-high. Approximately 35 minutes were spent during prep work, discussion and exam of the patient, and follow up documentation and all of their questions were answered. The documentation recorded by the scribe, accurately and completely reflects the services I personally performed and the decisions made by me. Abdoulaye Higgnis MD, F.A.C.C.  October 10, 2022

## 2022-10-10 NOTE — PROGRESS NOTES
[facility] [address] [place] [address]/[place]  Medication Management  ANTICOAGULATION    Referring Provider: Savannah Roca INR: 2.5-3.5     TODAY'S INR: 3.0     WARFARIN Dosage: Continue warfarin 5 mg MWF and half tablet for 2.5 mg all other days. INR (no units)   Date Value   2022 2.3   2022 2.1   2022 2.1   2022 3.1   2022 2.7   2022 4.6   2022 3.2     Medication changes:  none    Notes:    Fingerstick INR drawn per clinic protocol. Patient states no visible blood in urine and no black tarry stool. Denies any missed doses of warfarin. No change in other maintenance medications or in diet. Will recheck INR in 6 weeks. Patient acknowledges working in consult agreement with pharmacist as referred by his/her physician.                   For Pharmacy Admin Tracking Only    Intervention Detail: Adherence Monitorin  Total # of Interventions Recommended: 0  Total # of Interventions Accepted: 0  Time Spent (min): Deepa Tadeo 67, [address] [facility], PharmD

## 2022-10-11 ENCOUNTER — TELEPHONE (OUTPATIENT)
Dept: CARDIOLOGY | Age: 67
End: 2022-10-11

## 2022-10-11 LAB
CHOLESTEROL/HDL RATIO: 1.8
CHOLESTEROL: 193 MG/DL
HDLC SERPL-MCNC: 106 MG/DL
LDL CHOLESTEROL: 75 MG/DL (ref 0–130)
TRIGL SERPL-MCNC: 60 MG/DL

## 2022-10-11 NOTE — TELEPHONE ENCOUNTER
----- Message from Markus Siemens, MD sent at 10/10/2022  8:12 PM EDT -----  Blood work is good.   Thank you
Results left on pt vm
PAST SURGICAL HISTORY:  S/P thyroidectomy 3/2020

## 2022-10-12 ENCOUNTER — TELEPHONE (OUTPATIENT)
Dept: CARDIOLOGY | Age: 67
End: 2022-10-12

## 2022-10-12 NOTE — TELEPHONE ENCOUNTER
----- Message from Darrion Musa MD sent at 10/11/2022  8:05 PM EDT -----  Lipid panel is good.   Thank you

## 2022-10-31 NOTE — PROCEDURES
[address] [place] [facility], [address] [place] [address]                                 EVENT MONITOR    PATIENT NAME: Kwabena Tobar                    :        1955  MED REC NO:   [id number]                              ROOM:  ACCOUNT NO:   [de-identified]                           ADMIT DATE: 10/10/2022  PROVIDER:     Jordin Lugo MD    CARDIOVASCULAR DIAGNOSTIC DEPARTMENT    DATE OF STUDY:  10/10/2022    ORDERING PROVIDER:  Nilam Martínez. Saundra Cervantes MD    INTERPRETING PHYSICIAN: Tin Cervantes MD    DIAGNOSIS:  Paroxysmal atrial fibrillation. PHYSICIAN INTERPRETATION:  1. 3 days and 10 hours recorded. 2. Baseline rhythm is atrial fibrillation throughout with occasional  atrial flutter.  Average heart rate 90 bpm, ranges between 54 and 181  bpm.  3. Rare premature ventricular contractions (PVCs burden 0.61%)        TOVA Ji MD    D: 10/31/2022 8:38:35       T: 10/31/2022 8:40:27     MYA/CHELSEA  Job#: [id number]     Doc#: Unknown    CC:

## 2022-11-01 ENCOUNTER — OFFICE VISIT (OUTPATIENT)
Dept: CARDIOLOGY | Age: 67
End: 2022-11-01
Payer: MEDICARE

## 2022-11-01 VITALS
OXYGEN SATURATION: 99 % | HEIGHT: 64 IN | RESPIRATION RATE: 18 BRPM | BODY MASS INDEX: 18.44 KG/M2 | DIASTOLIC BLOOD PRESSURE: 75 MMHG | SYSTOLIC BLOOD PRESSURE: 126 MMHG | WEIGHT: 108 LBS | HEART RATE: 86 BPM

## 2022-11-01 DIAGNOSIS — I48.0 PAF (PAROXYSMAL ATRIAL FIBRILLATION) (HCC): ICD-10-CM

## 2022-11-01 DIAGNOSIS — E78.2 MIXED HYPERLIPIDEMIA: ICD-10-CM

## 2022-11-01 DIAGNOSIS — I10 ESSENTIAL HYPERTENSION: ICD-10-CM

## 2022-11-01 DIAGNOSIS — Z95.1 S/P CABG X 2: ICD-10-CM

## 2022-11-01 DIAGNOSIS — I25.10 ASHD (ARTERIOSCLEROTIC HEART DISEASE): Primary | ICD-10-CM

## 2022-11-01 DIAGNOSIS — Z95.2 S/P AORTIC VALVE AND MITRAL VALVE REPLACEMENT: ICD-10-CM

## 2022-11-01 DIAGNOSIS — R42 DIZZINESS: ICD-10-CM

## 2022-11-01 DIAGNOSIS — Z79.01 CHRONIC ANTICOAGULATION: ICD-10-CM

## 2022-11-01 PROCEDURE — 3078F DIAST BP <80 MM HG: CPT | Performed by: INTERNAL MEDICINE

## 2022-11-01 PROCEDURE — 3074F SYST BP LT 130 MM HG: CPT | Performed by: INTERNAL MEDICINE

## 2022-11-01 PROCEDURE — 99214 OFFICE O/P EST MOD 30 MIN: CPT | Performed by: INTERNAL MEDICINE

## 2022-11-01 PROCEDURE — 1123F ACP DISCUSS/DSCN MKR DOCD: CPT | Performed by: INTERNAL MEDICINE

## 2022-11-01 NOTE — PROGRESS NOTES
Vladimir Barthel am scribing for and in the presence of Avani Raines MD, F.A.C.C..    Patient: Jason Thornton  : 1955  Date of Visit: 2022    REASON FOR VISIT / CONSULTATION: Follow-up (HX: ASHD, S/P  CABG x 2, HLD, PAF, S/P Aortic valve. Pt is here today to follow up on her abnormal CAM results. Palpitations daily off and on. SOB with exertion or while using her arms and taking a shower and she has to sit down and if she doesn't sit she feels like shes going to fall down. Denies CP or any light headed/dizziness. )      History of Present Illness:        Dear Garland Arellano,    I had the pleasure of seeing Jsaon Thornton in my office today. Ms. Bryan Doran is a 74 y.o. female who presented for follow up. She has extensive cardiac history. History of rheumatic fever diagnosed in . She had ischemic colitis back in , this was probably embolic from atrial fibrillation. History of open heart surgery with 2 vessel bypass (SVG to LAD and SVG to OM1), aortic valve replacement with 19 mm Saint Zhao mechanical valve, mitral valve replacement with 27 mm Saint Zhao mechanical valve and maze procedure on 2008. Aortic valve replacement with 21 Amy-Barnett aortic valve and aortic root replacement with bovine patch in addition to tricuspid valve repair in     She has been maintained on warfarin since her episode of ischemic colitis and Aortic/mitral valve replacement with no bleeding complications. She told me that she has never been taking statin therapy. I looked back in the note from Dr. Yamileth Pearson, the only thing I saw was fish oil. Patient had CABGx2 at the time of her mitral and aortic valve replacement. She has history of hypertension. Denied history of diabetes or dyslipidemia. Other relevant medical history includes migraine headaches and anxiety. She said she stopped taking Paxil because it made her gaining weight.     CAM monitor done on 2020- 3 days and 1 hour recorded. Baseline rhythm is sinus with average heart rate of 71 bpm ranging between 42 and 132 bpm. Sinus tachycardia represented 3% of the study distribution. Occasional APCs, 3% with multiple runs of what appeared to be ectopic atrial tachycardia. The longest lasted for 2.5 hours at a heart rate of 174 bpm. These episodes usually terminated into sinus pauses followed by junctional escape beats and sinus bradycardia. A total of 12 pauses more than 2.5 seconds, the longest being 3.0 seconds. Some of these were conversion pauses and happening during typical daytime hours. Occasional PVCs (3%) with 2 runs of wide QRS tachycardia. The longest was 5 beats at a heart rate of 134 bpm.  Cannot exclude supraventricular tachycardia with aberrant conduction. We offered her an ablation procedure after getting the CAM monitor but patient refused to do this during the pandemic. EKG done in office on (4/27/2022): Showed normal sinus rhythm no ischemic changes from prior. CAM done on 4/27/2022: 6 days and 19 hours recorded. Baseline rhythm is sinus with average heart rate of 65 bpm, ranging between 38 and 79 bpm. Severe bradycardia represented less than 1% of the study duration. 67 pauses recorded, the longest was 3.1 seconds. Some of these pauses are compensatory pauses following isolated PVCs. Very frequent short episodes of atrial tachycardia, the longest lasted for 31 beats at an average heart rate of 151 bpm. Occasional PVCs. PVC burden 2.19% with multiple bigeminy and ventricular couplets. No ventricular runs. Further work-up, including but not limited to pacemaker. Therapy may be needed as clinically indicated. Tilt Table Test done on 5/12/2022: Abnormal head upright tilt table study. The patient's heart rate, blood  pressure response and symptoms were most consistent with dysautonomia.     Echo done on 5/12/2022: Global left ventricular systolic function appears preserved with an estimated ejection fraction of 60%. The left ventricular cavity size is within normal limits and the left ventricular wall thickness is within normal limits. The inferoseptal wall is abnormal in its motion which is not unusual status post open heart surgery. S/P Aortic valve replacement with 21 mm CE valve, mean gradient across the aortic valve is 5 mmHg. No significant aortic regurgitation. S/P mechanical MVR with a mean gradient of 6.3 mmHg. No significant mitral regurgitation. S/P tricuspid valve repair with a mean gradient of 3 mmHg. Mild to moderate tricuspid regurgitation. Mild pulmonary hypertension with an estimated right ventricular systolic pressure of 35 mmHg. Evidence of moderate diastolic dysfunction is seen. Compared to the previous study of 4/16/2021, no significant change was seen. CAM monitor done on 10/10/22: 3 days and 10 hours recorded. Baseline rhythm is atrial fibrillation throughout with occasional atrial flutter. Average heart rate 90 bpm, ranges between 54 and 181 bpm. Rare premature ventricular contractions (PVCs burden 0.61%)      Ms. Alfredo Morgan is here today to follow up on her CAM monitor results. She says she still is having palpitations off and on daily. She says if she does not do anything all day she is fine. She has shortness of breath whenever she exerts herself or does her daily household chores. She says if she raises her arms up and uses them especially in the shower she get short of breath and has to sit down. She denies any current chest pain, pressure, or tightness. She denies any light headed or dizziness. She has no issues sleeping at night. No fever or cough. No abdominal pain, nausea or vomiting. No problem moving her bowel. No bleeding complications from taking aspirin and warfarin. Weight is stable. No change in her appetite.     Bleeding Risks: Ms. Alfredo Morgan denies any current or recent bleeding problems including a history of a GI bleed, ulcers, recent or upcoming surgeries, blood in her stool or black tarry stools or blood in her urine. PAST MEDICAL HISTORY:         Past Medical History:   Diagnosis Date    Atrial fibrillation ([facility]     CAD (coronary artery disease)     CHF (congestive heart failure) ([facility])     Congestive heart failure (CHF) ([facility]     Depression 2015    H/O mitral valve replacement     H/O tricuspid valve repair     Hypertension     Ischemic colitis ([facility] ,     Migraine     Migraine     Migraine     PAF (paroxysmal atrial fibrillation) ([facility])     Rheumatic fever     S/P AVR     SOB (shortness of breath)        CURRENT ALLERGIES: Patient has no known allergies. REVIEW OF SYSTEMS: 14 systems were reviewed. Pertinent positives and negatives as above, all else negative.      Past Surgical History:   Procedure Laterality Date    AORTIC VALVE REPLACEMENT      CARDIAC CATHETERIZATION      CARDIAC VALVE REPLACEMENT      aortic and mitral    CHOLECYSTECTOMY      COLONOSCOPY      MITRAL VALVE REPLACEMENT      OVARIAN CYST REMOVAL Right 1977    PRE-MALIGNANT / BENIGN SKIN LESION EXCISION Left 13    face    TONSILLECTOMY AND ADENOIDECTOMY      TRANSESOPHAGEAL ECHOCARDIOGRAM  2017    TUBAL LIGATION      Social History:  Social History     Tobacco Use    Smoking status: Former     Packs/day: 0.50     Years: 20.00     Pack years: 10.00     Types: Cigarettes     Quit date: 3/28/1993     Years since quittin.6    Smokeless tobacco: Never   Substance Use Topics    Alcohol use: No    Drug use: No        CURRENT MEDICATIONS:        Outpatient Medications Marked as Taking for the 22 encounter (Office Visit) with Awilda Bush MD   Medication Sig Dispense Refill    losartan (COZAAR) 50 MG tablet Take 1 tablet by mouth in the morning and at bedtime 180 tablet 3    magnesium (MAGNESIUM-OXIDE) 250 MG TABS tablet Take 250 mg by mouth daily      carvedilol (COREG) 6.25 MG tablet Take 1 tablet by mouth 2 times daily (with meals) 180 tablet 3 atorvastatin (LIPITOR) 20 MG tablet Take 1 tablet by mouth once daily 90 tablet 3    warfarin (COUMADIN) 5 MG tablet Take 1 tablet by mouth See Admin Instructions Coumadin Clinic:  2.5 mg every Tuesday Thursday Saturday;  5 mg all other days (Patient taking differently: Take 5 mg by mouth See Admin Instructions Coumadin Clinic:  5mg MWF and Sunday, 2.5mg all other days) 90 tablet 3    Biotin 30808 MCG TABS Take 1 tablet by mouth daily      aspirin-acetaminophen-caffeine (EXCEDRIN MIGRAINE) 250-250-65 MG per tablet Take 1 tablet by mouth every 6 hours as needed for Headaches      oxybutynin (DITROPAN-XL) 10 MG extended release tablet Take 10 mg by mouth daily Uses PRN      Multiple Vitamins-Minerals (THERAPEUTIC MULTIVITAMIN-MINERALS) tablet Take 1 tablet by mouth daily      omeprazole (PRILOSEC) 20 MG capsule Take 1 capsule by mouth Daily 30 capsule 3    aspirin 81 MG tablet Take 162 mg by mouth daily          FAMILY HISTORY: family history includes Cancer (age of onset: 40) in her mother; Cancer (age of onset: 61) in her sister; Diabetes in her father. Physical Examination:     /75 (Site: Right Upper Arm, Position: Sitting, Cuff Size: Medium Adult)   Pulse 86   Resp 18   Ht 5' 4\" (1.626 m)   Wt 108 lb (49 kg)   SpO2 99%   BMI 18.54 kg/m²  Body mass index is 18.54 kg/m². Constitutional: She appeared oriented to person and place. She appears well-developed and well-nourished. In no acute distress. HEENT: Normocephalic and atraumatic. No JVD present. Carotid bruit is not present. No mass and no thyromegaly present. No lymphadenopathy noted. Cardiovascular: Normal rate, regular rhythm, mechanical S1. Loud S2. Short ejection systolic murmur heard at the second left intercostal space without radiation. Pulmonary/Chest: Effort normal and breath sounds normal. No respiratory distress. She has no wheezes, rhonchi or rales. Abdominal: Soft, non-tender. She exhibits no organomegaly, mass or bruit. Extremities: No edema. No cyanosis or clubbing. 2+ radial and carotid pulses. Distal extremity pulses: 2+ bilaterally. .  Neurological: Alertness and orientation as per Constitutional exam. No evidence of gross cranial nerve deficit. Coordination appeared normal.   Skin: Skin is warm and dry. There is no rash or diaphoresis. Psychiatric: She has a normal mood and affect. Her speech is normal and behavior is normal.      MOST RECENT LABS ON RECORD:   Lab Results   Component Value Date    WBC 4.6 10/10/2022    HGB 14.1 10/10/2022    HCT 43.2 10/10/2022     (L) 10/10/2022    CHOL 193 10/10/2022    TRIG 60 10/10/2022     10/10/2022    ALT 22 04/01/2022    AST 28 04/01/2022     10/10/2022    K 4.5 10/10/2022     10/10/2022    CREATININE 0.86 10/10/2022    BUN 21 10/10/2022    CO2 26 10/10/2022    TSH 1.41 04/01/2022    INR 3 10/10/2022       ASSESSMENT:     1. ASHD (arteriosclerotic heart disease)    2. S/P CABG x 2    3. S/P aortic valve and mitral valve replacement    4. PAF (paroxysmal atrial fibrillation) ([address]    5. Chronic anticoagulation    6. Mixed hyperlipidemia    7. Essential hypertension    8. Dizziness       PLAN:        Paroxysmal Atrial Fibrillation: Currently maintaining sinus rhythm S/P surgical maze procedure in 2008. Paroxysmal atrial fibrillation noted on prior Cam monitor. Worsening palpitations. Beta Blocker: Continue Carvedilol (Coreg) 6.25 mg twice daily.     Stroke Risk: CHADS2-VASc Score: greater than 2 (2.2% stroke risk)  [id number] Score for Atrial Fibrillation Stroke Risk   Risk   Factors  Component Value   C CHF No 0   H HTN Yes 1   A2 Age >= 76 No,  (68 y.o.) 0   D DM No 0   S2 Prior Stroke/TIA No 0   V Vascular Disease No 0   A Age 74-69 Yes,  (68 y.o.) 1   Sc Sex female 1    [id number]  Score  3   Score last updated 9/1/70 2:88 PM EDT  Click here for a link to the UpToDate guideline \"Atrial Fibrillation: Anticoagulation therapy to prevent should the need arise. Sincerely,  Garo Talley MD, F.A.C.C. [facility] Specialist    [address] [facility]  Phone: [phone], Fax: [phone]     I believe that the risk of significant morbidity and mortality related to the patient's current medical conditions are: intermediate-high. Approximately 35 minutes were spent during prep work, discussion and exam of the patient, and follow up documentation and all of their questions were answered. The documentation recorded by the scribe, accurately and completely reflects the services I personally performed and the decisions made by me. Garo Talley MD, F.A.C.C.  November 1, 2022

## 2022-11-01 NOTE — PATIENT INSTRUCTIONS
SURVEY:    You may be receiving a survey from [employer] regarding your visit today. Please complete the survey to enable us to provide the highest quality of care to you and your family. If you cannot score us a very good on any question, please call the office to discuss how we could have made your experience a very good one. Thank you.

## 2022-11-15 ENCOUNTER — HOSPITAL ENCOUNTER (OUTPATIENT)
Dept: PHARMACY | Age: 67
Setting detail: THERAPIES SERIES
Discharge: HOME | End: 2022-11-15
Payer: MEDICARE

## 2022-11-15 VITALS — WEIGHT: 109 LBS | BODY MASS INDEX: 18.71 KG/M2

## 2022-11-15 DIAGNOSIS — Z95.2 S/P MVR (MITRAL VALVE REPLACEMENT): Primary | ICD-10-CM

## 2022-11-15 DIAGNOSIS — I48.91 ATRIAL FIBRILLATION, UNSPECIFIED TYPE (HCC): ICD-10-CM

## 2022-11-15 DIAGNOSIS — Z95.2 AORTIC VALVE REPLACED: ICD-10-CM

## 2022-11-15 LAB — INR BLD: 3.4

## 2022-11-15 PROCEDURE — 85610 PROTHROMBIN TIME: CPT | Performed by: FAMILY MEDICINE

## 2022-11-15 PROCEDURE — 99211 OFF/OP EST MAY X REQ PHY/QHP: CPT | Performed by: FAMILY MEDICINE

## 2022-11-15 NOTE — PATIENT INSTRUCTIONS
Please continue to take warfarin 2.5mg (1/2 tablet) on Tuesday, Thursday and Saturday and 5mg (1 tablet) all other days. Continue to monitor for signs of bleeding. Return to coumadin clinic in 7 weeks.

## 2022-11-15 NOTE — PROGRESS NOTES
[address]/[place]  Medication Management  ANTICOAGULATION    Referring Provider: Dr Anam Morejon INR: 2.5-3.5    TODAY'S INR: 3.4    WARFARIN Dosage: continue 2.5mg TRSat, 5mg all other days    INR (no units)   Date Value   11/15/2022 3.4   10/10/2022 3   2022 2.3   2022 2.1   2022 2.1   2022 3.1   2022 2.7       Medication changes:  No changes  Notes:    Fingerstick INR drawn per clinic protocol. Patient states no visible blood in urine and no black tarry stool. Denies any missed doses of warfarin. No change in other maintenance medications or in diet. Will recheck INR in 7 weeks. Patient is awaiting a call from Dr Js Martin, electrophysiologist at [address] [place] [address]. Dr Mardel Bence has referred her to see physician following a 4 day CAM recorder monitoring. Patient acknowledges working in consult agreement with pharmacist as referred by his/her physician.                   For Pharmacy Admin Tracking Only    Intervention Detail: Adherence Monitorin  Total # of Interventions Recommended: 2  Total # of Interventions Accepted: 2  Time Spent (min): 20      MAMIE Lara.Sandeep., 11/15/2022,12:09 PM

## 2022-11-23 RX ORDER — ATORVASTATIN CALCIUM 20 MG/1
TABLET, FILM COATED ORAL
Qty: 90 TABLET | Refills: 3 | Status: SHIPPED | OUTPATIENT
Start: 2022-11-23

## 2023-01-01 DIAGNOSIS — Z79.01 CHRONIC ANTICOAGULATION: ICD-10-CM

## 2023-01-01 DIAGNOSIS — Z86.79 HISTORY OF ATRIAL FIBRILLATION: ICD-10-CM

## 2023-01-03 ENCOUNTER — HOSPITAL ENCOUNTER (OUTPATIENT)
Dept: PHARMACY | Age: 68
Setting detail: THERAPIES SERIES
Discharge: HOME | End: 2023-01-03
Payer: MEDICARE

## 2023-01-03 VITALS
DIASTOLIC BLOOD PRESSURE: 75 MMHG | BODY MASS INDEX: 18.64 KG/M2 | SYSTOLIC BLOOD PRESSURE: 126 MMHG | WEIGHT: 108.6 LBS | HEART RATE: 90 BPM

## 2023-01-03 DIAGNOSIS — Z95.2 AORTIC VALVE REPLACED: ICD-10-CM

## 2023-01-03 DIAGNOSIS — I48.91 ATRIAL FIBRILLATION, UNSPECIFIED TYPE (HCC): ICD-10-CM

## 2023-01-03 DIAGNOSIS — Z95.2 S/P MVR (MITRAL VALVE REPLACEMENT): Primary | ICD-10-CM

## 2023-01-03 LAB — INR BLD: 4.2

## 2023-01-03 PROCEDURE — 85610 PROTHROMBIN TIME: CPT | Performed by: FAMILY MEDICINE

## 2023-01-03 PROCEDURE — 99212 OFFICE O/P EST SF 10 MIN: CPT | Performed by: FAMILY MEDICINE

## 2023-01-03 RX ORDER — WARFARIN SODIUM 5 MG/1
TABLET ORAL
Qty: 90 TABLET | Refills: 3 | Status: SHIPPED | OUTPATIENT
Start: 2023-01-03

## 2023-01-03 NOTE — PROGRESS NOTES
[facility] [address] [place] [address]/[place]  Medication Management  ANTICOAGULATION    Referring Provider: Dr Bill Ulrich INR: 2.5-3.5    TODAY'S INR: 4.2    WARFARIN Dosage: hold x1 then resume 2.5mg TRSat, 5mg all other days    INR (no units)   Date Value   2023 4.2   11/15/2022 3.4   10/10/2022 3   2022 2.3   2022 2.1   2022 2.1   2022 3.1       Medication changes:  No changes  Notes:    Fingerstick INR drawn per clinic protocol. Patient states no visible blood in urine and no black tarry stool. Denies any missed doses of warfarin. No change in other maintenance medications or in diet. Will recheck INR in 3 weeks. Patient states she has been eating [place] lot of different things [place] and New Year's and still has some [facility] foods left. Patient is not able to completely tell me the foods she has been eating but this may be contributing to her supratherapeutic INR today. Patient will hold warfarin today then resume previously stable dosing. Patient acknowledges working in consult agreement with pharmacist as referred by his/her physician.                   For Pharmacy Admin Tracking Only    Intervention Detail: Adherence Monitorin and Dose Adjustment: 1, reason: Therapy De-escalation  Total # of Interventions Recommended: 3  Total # of Interventions Accepted: 3  Time Spent (min): [address] [place] [address], RSingh., 1/3/2023,12:43 PM

## 2023-01-03 NOTE — PATIENT INSTRUCTIONS
Please do not take warfarin today then return to your regular dosing of 2.5mg (1/2 tablet) on Tuesday, Thursday and Saturday and 5mg (1 tablet) all other days. Continue to monitor for signs of bleeding. Return to coumadin clinic in 3 weeks. No

## 2023-01-24 ENCOUNTER — APPOINTMENT (OUTPATIENT)
Dept: PHARMACY | Age: 68
End: 2023-01-24
Payer: MEDICARE

## 2023-01-31 ENCOUNTER — HOSPITAL ENCOUNTER (OUTPATIENT)
Dept: PHARMACY | Age: 68
Setting detail: THERAPIES SERIES
Discharge: HOME | End: 2023-01-31
Payer: MEDICARE

## 2023-01-31 VITALS — WEIGHT: 108.2 LBS | BODY MASS INDEX: 18.57 KG/M2

## 2023-01-31 DIAGNOSIS — Z95.2 AORTIC VALVE REPLACED: ICD-10-CM

## 2023-01-31 DIAGNOSIS — Z95.2 S/P MVR (MITRAL VALVE REPLACEMENT): Primary | ICD-10-CM

## 2023-01-31 DIAGNOSIS — I48.91 ATRIAL FIBRILLATION, UNSPECIFIED TYPE (HCC): ICD-10-CM

## 2023-01-31 LAB — INR BLD: 3.7

## 2023-01-31 PROCEDURE — 99212 OFFICE O/P EST SF 10 MIN: CPT | Performed by: FAMILY MEDICINE

## 2023-01-31 PROCEDURE — 85610 PROTHROMBIN TIME: CPT | Performed by: FAMILY MEDICINE

## 2023-01-31 NOTE — PATIENT INSTRUCTIONS
Please do not take warfarin today then begin taking warfarin 5mg (1 tablet) on Monday, Wednesday and Friday and 2.5mg (1/2 tablet) all other days. Continue to monitor for signs of bleeding. Return to coumadin clinic in 3 weeks.

## 2023-01-31 NOTE — PROGRESS NOTES
[address]/[place]  Medication Management  ANTICOAGULATION    Referring Provider: Dr Giordano Dates INR: 2.5-3.5    TODAY'S INR: 3.7    WARFARIN Dosage: hold x1 then decrease to 5mg MWF, 2.5mg all other days    INR (no units)   Date Value   2023 3.7   2023 4.2   11/15/2022 3.4   10/10/2022 3   2022 2.3   2022 2.1   2022 2.1       Medication changes:  No changes  Notes:    Fingerstick INR drawn per clinic protocol. Patient states no visible blood in urine and no black tarry stool. Denies any missed doses of warfarin. No change in other maintenance medications or in diet. Will recheck INR in 3 weeks. Patient is eating fewer greens during the winter as well as not cooking as much due to fatigue from tachy/liliana symptoms. Patient is scheduled for initial visit with Dr Vini Hayward in [place] to discuss need for pacemaker, etc the first week of March. Patient acknowledges working in consult agreement with pharmacist as referred by his/her physician.                   For Pharmacy Admin Tracking Only    Intervention Detail: Adherence Monitorin and Dose Adjustment: 2, reason: Therapy De-escalation  Total # of Interventions Recommended: 4  Total # of Interventions Accepted: 4  Time Spent (min):  25      Gildardo Lynch R.Ph., 2023,1:35 PM

## 2023-02-21 ENCOUNTER — APPOINTMENT (OUTPATIENT)
Dept: PHARMACY | Age: 68
End: 2023-02-21
Payer: MEDICARE

## 2023-02-28 ENCOUNTER — HOSPITAL ENCOUNTER (OUTPATIENT)
Dept: PHARMACY | Age: 68
Setting detail: THERAPIES SERIES
Discharge: HOME | End: 2023-02-28
Payer: MEDICARE

## 2023-02-28 VITALS
DIASTOLIC BLOOD PRESSURE: 92 MMHG | HEART RATE: 88 BPM | BODY MASS INDEX: 18.37 KG/M2 | WEIGHT: 107 LBS | SYSTOLIC BLOOD PRESSURE: 155 MMHG

## 2023-02-28 DIAGNOSIS — Z95.2 AORTIC VALVE REPLACED: ICD-10-CM

## 2023-02-28 DIAGNOSIS — Z95.2 S/P MVR (MITRAL VALVE REPLACEMENT): Primary | ICD-10-CM

## 2023-02-28 LAB — INR BLD: 3.1

## 2023-02-28 PROCEDURE — 99211 OFF/OP EST MAY X REQ PHY/QHP: CPT

## 2023-02-28 PROCEDURE — 85610 PROTHROMBIN TIME: CPT

## 2023-02-28 NOTE — PROGRESS NOTES
[facility] [address] [place] [address]/[place]  Medication Management  ANTICOAGULATION    Referring Provider: Dr Kelsey Paiz INR: 2.5-3.5    TODAY'S INR: 3.1    WARFARIN Dosage: Continue warfarin 5 mg po every MWF; 2.5 mg po all other days    INR (no units)   Date Value   2023 3.1   2023 3.7   2023 4.2   11/15/2022 3.4   10/10/2022 3   2022 2.3   2022 2.1       Medication changes:  No changes  Notes:    Fingerstick INR drawn per clinic protocol. Patient states no visible blood in urine and no black tarry stool. Denies any missed doses of warfarin. No change in other maintenance medications or in diet. Will recheck INR in 6 weeks. .  Patient acknowledges working in consult agreement with pharmacist as referred by his/her physician. For Pharmacy Admin Tracking Only    Intervention Detail: Adherence Monitorin  Total # of Interventions Recommended: 1  Total # of Interventions Accepted: 1  Time Spent (min): [address] [facility]

## 2023-03-16 ENCOUNTER — TELEPHONE (OUTPATIENT)
Dept: CARDIOLOGY | Age: 68
End: 2023-03-16

## 2023-03-16 NOTE — TELEPHONE ENCOUNTER
Patient saw Neil Hernandez and he wanted her to get scheduled with you for a cardioversion and then start Amiodarone. Patient is scheduled on 4/26 at [facility] for Ablation.

## 2023-03-17 DIAGNOSIS — Z86.79 HISTORY OF ATRIAL FIBRILLATION: Primary | ICD-10-CM

## 2023-03-21 DIAGNOSIS — Z95.1 S/P CABG X 2: ICD-10-CM

## 2023-03-21 DIAGNOSIS — Z95.2 S/P AORTIC VALVE AND MITRAL VALVE REPLACEMENT: ICD-10-CM

## 2023-03-21 DIAGNOSIS — I48.0 PAF (PAROXYSMAL ATRIAL FIBRILLATION) (HCC): ICD-10-CM

## 2023-03-21 DIAGNOSIS — Z79.01 CHRONIC ANTICOAGULATION: ICD-10-CM

## 2023-03-21 DIAGNOSIS — I25.10 ASHD (ARTERIOSCLEROTIC HEART DISEASE): ICD-10-CM

## 2023-03-21 DIAGNOSIS — E78.2 MIXED HYPERLIPIDEMIA: ICD-10-CM

## 2023-03-21 DIAGNOSIS — I10 ESSENTIAL HYPERTENSION: ICD-10-CM

## 2023-03-22 RX ORDER — CARVEDILOL 6.25 MG/1
TABLET ORAL
Qty: 180 TABLET | Refills: 3 | Status: SHIPPED | OUTPATIENT
Start: 2023-03-22

## 2023-03-23 ENCOUNTER — HOSPITAL ENCOUNTER (OUTPATIENT)
Dept: NON INVASIVE DIAGNOSTICS | Age: 68
Discharge: HOME | End: 2023-03-23
Attending: INTERNAL MEDICINE
Payer: MEDICARE

## 2023-03-23 ENCOUNTER — HOSPITAL ENCOUNTER (OUTPATIENT)
Dept: CARDIAC CATH/INVASIVE PROCEDURES | Age: 68
Discharge: HOME | End: 2023-03-23
Attending: INTERNAL MEDICINE | Admitting: INTERNAL MEDICINE
Payer: MEDICARE

## 2023-03-23 VITALS
RESPIRATION RATE: 14 BRPM | DIASTOLIC BLOOD PRESSURE: 83 MMHG | SYSTOLIC BLOOD PRESSURE: 156 MMHG | TEMPERATURE: 98.5 F | HEART RATE: 89 BPM | OXYGEN SATURATION: 100 %

## 2023-03-23 PROCEDURE — 93243 EXT ECG>48HR<7D SCAN A/R: CPT

## 2023-03-23 PROCEDURE — 93242 EXT ECG>48HR<7D RECORDING: CPT

## 2023-03-23 PROCEDURE — 93005 ELECTROCARDIOGRAM TRACING: CPT | Performed by: INTERNAL MEDICINE

## 2023-03-23 NOTE — PROGRESS NOTES
Cardioversion procedure will be canceled. Patient is in normal sinus rhythm. She will be discharged with a Cam monitor for 3 days. She still need to follow-up with  On 4/56/2023 for EPS/ablation. Rafael De Anda MD, MS, F.A.C.C.   [facility]) Cardiology Specialists, [address] [place] [address], [address]  Phone: [phone], Fax: [phone]

## 2023-03-24 LAB
EKG ATRIAL RATE: 88 BPM
EKG ATRIAL RATE: 89 BPM
EKG P-R INTERVAL: 216 MS
EKG Q-T INTERVAL: 376 MS
EKG Q-T INTERVAL: 380 MS
EKG QRS DURATION: 94 MS
EKG QRS DURATION: 94 MS
EKG QTC CALCULATION (BAZETT): 457 MS
EKG QTC CALCULATION (BAZETT): 459 MS
EKG R AXIS: -61 DEGREES
EKG R AXIS: -61 DEGREES
EKG T AXIS: 104 DEGREES
EKG T AXIS: 108 DEGREES
EKG VENTRICULAR RATE: 88 BPM
EKG VENTRICULAR RATE: 89 BPM

## 2023-04-24 ENCOUNTER — HOSPITAL ENCOUNTER (OUTPATIENT)
Dept: PHARMACY | Age: 68
Setting detail: THERAPIES SERIES
Discharge: HOME | End: 2023-04-24
Payer: MEDICARE

## 2023-04-24 VITALS
DIASTOLIC BLOOD PRESSURE: 84 MMHG | HEART RATE: 71 BPM | SYSTOLIC BLOOD PRESSURE: 166 MMHG | BODY MASS INDEX: 18.02 KG/M2 | WEIGHT: 105 LBS

## 2023-04-24 DIAGNOSIS — Z95.2 S/P MVR (MITRAL VALVE REPLACEMENT): Primary | ICD-10-CM

## 2023-04-24 DIAGNOSIS — Z95.2 AORTIC VALVE REPLACED: ICD-10-CM

## 2023-04-24 LAB — INR BLD: 3.9

## 2023-04-24 PROCEDURE — 99212 OFFICE O/P EST SF 10 MIN: CPT

## 2023-04-24 PROCEDURE — 85610 PROTHROMBIN TIME: CPT

## 2023-04-24 RX ORDER — POLYETHYLENE GLYCOL 3350 17 G/17G
17 POWDER, FOR SOLUTION ORAL DAILY PRN
COMMUNITY

## 2023-04-24 NOTE — PATIENT INSTRUCTIONS
Continue to monitor urine and stool for signs and symptoms of bleeding. Please notify the clinic of any medication changes. Please remember to bring all medications (both prescription and OTC) to your next visit. Kindly notify the clinic if you are unable to make to your next appointment. Continue current dose of warfarin as instructed on dosing calendar provided.

## 2023-04-24 NOTE — PROGRESS NOTES
[facility] [address] [place] [address]/[place]  Medication Management  ANTICOAGULATION    Referring Provider: Dr. Jn Lee INR: 2.5-3.5     TODAY'S INR: 3.9     WARFARIN Dosage: Hold warfarin today then continue warfarin whole 5 mg tablet MWF and half tablet for 2.5 mg all other days. INR (no units)   Date Value   2023 3.6   2023 3.1   2023 3.7   2023 4.2   11/15/2022 3.4   10/10/2022 3   2022 2.3       Hemoglobin   Date Value Ref Range Status   10/10/2022 14.1 11.9 - 15.1 g/dL Final     Hematocrit   Date Value Ref Range Status   10/10/2022 43.2 36.3 - 47.1 % Final     ALT   Date Value Ref Range Status   2022 22 5 - 33 U/L Final     AST   Date Value Ref Range Status   2022 28 <32 U/L Final       Medication changes: Added Miralax to med list    Notes:    Fingerstick INR drawn per clinic protocol. Patient states no visible blood in urine and no black tarry stool. Denies any missed doses of warfarin. No change in other maintenance medications or in diet. Will recheck INR in 2 weeks. Patient acknowledges working in consult agreement with pharmacist as referred by his/her physician. Ablation at [facility] on 23.               For Pharmacy Admin Tracking Only    Intervention Detail: Adherence Monitorin, Dose Adjustment: 1, reason: Therapy Optimization, and New Rx: 1, reason: Patient Preference  Total # of Interventions Recommended: 2  Total # of Interventions Accepted: 2  Time Spent (min): Deepa Tadeo [address], [address], PharmD

## 2023-05-09 ENCOUNTER — HOSPITAL ENCOUNTER (OUTPATIENT)
Dept: PHARMACY | Age: 68
Setting detail: THERAPIES SERIES
Discharge: HOME | End: 2023-05-09
Payer: MEDICARE

## 2023-05-09 VITALS
HEART RATE: 43 BPM | SYSTOLIC BLOOD PRESSURE: 119 MMHG | BODY MASS INDEX: 18.47 KG/M2 | DIASTOLIC BLOOD PRESSURE: 52 MMHG | WEIGHT: 107.6 LBS

## 2023-05-09 DIAGNOSIS — Z95.2 AORTIC VALVE REPLACED: ICD-10-CM

## 2023-05-09 DIAGNOSIS — I48.91 ATRIAL FIBRILLATION, UNSPECIFIED TYPE (HCC): ICD-10-CM

## 2023-05-09 DIAGNOSIS — Z95.2 S/P MVR (MITRAL VALVE REPLACEMENT): Primary | ICD-10-CM

## 2023-05-09 LAB — INR BLD: 3

## 2023-05-09 PROCEDURE — 85610 PROTHROMBIN TIME: CPT | Performed by: FAMILY MEDICINE

## 2023-05-09 PROCEDURE — 99211 OFF/OP EST MAY X REQ PHY/QHP: CPT | Performed by: FAMILY MEDICINE

## 2023-05-09 NOTE — PATIENT INSTRUCTIONS
Continue to take warfarin 5mg (1 tablet) Mondays, Wednesdays and Fridays and 2.5mg (1/2 tablet) all other days. Continue to monitor urine and stool for signs and symptoms of bleeding. Please notify the clinic of any medication changes. Please remember to bring all medications (both prescription and OTC) to your next visit. Kindly notify the clinic if you are unable to make to your next appointment. Continue current dose of warfarin as instructed on dosing calendar provided.

## 2023-05-09 NOTE — PROGRESS NOTES
is to have a 30 day event monitor placed in the near future. Patient acknowledges working in consult agreement with pharmacist as referred by his/her physician. Addend:  Dr Leyla Minor responded and requests patient be seen in his office this week. I spoke with dept and they are contacting patient to schedule at this time. I spoke with patient late yesterday and she is awaiting the call.             For Pharmacy Admin Tracking Only    Intervention Detail: Adherence Monitorin  Total # of Interventions Recommended: 2  Total # of Interventions Accepted: 2  Time Spent (min): [address] [place] [address] [place], R.Ph., 2023,12:10 PM

## 2023-05-11 ENCOUNTER — HOSPITAL ENCOUNTER (OUTPATIENT)
Age: 68
Discharge: HOME | End: 2023-05-11
Payer: MEDICARE

## 2023-05-11 ENCOUNTER — OFFICE VISIT (OUTPATIENT)
Dept: CARDIOLOGY | Age: 68
End: 2023-05-11
Payer: MEDICARE

## 2023-05-11 VITALS
BODY MASS INDEX: 18.3 KG/M2 | SYSTOLIC BLOOD PRESSURE: 126 MMHG | RESPIRATION RATE: 18 BRPM | DIASTOLIC BLOOD PRESSURE: 75 MMHG | HEIGHT: 64 IN | HEART RATE: 64 BPM | WEIGHT: 107.2 LBS | OXYGEN SATURATION: 100 %

## 2023-05-11 DIAGNOSIS — R00.1 BRADYCARDIA: ICD-10-CM

## 2023-05-11 DIAGNOSIS — E78.2 MIXED HYPERLIPIDEMIA: ICD-10-CM

## 2023-05-11 DIAGNOSIS — I48.0 PAF (PAROXYSMAL ATRIAL FIBRILLATION) (HCC): Primary | ICD-10-CM

## 2023-05-11 DIAGNOSIS — I48.0 PAF (PAROXYSMAL ATRIAL FIBRILLATION) (HCC): ICD-10-CM

## 2023-05-11 DIAGNOSIS — R55 NEAR SYNCOPE: ICD-10-CM

## 2023-05-11 DIAGNOSIS — I10 ESSENTIAL HYPERTENSION: ICD-10-CM

## 2023-05-11 DIAGNOSIS — I25.10 ASHD (ARTERIOSCLEROTIC HEART DISEASE): ICD-10-CM

## 2023-05-11 DIAGNOSIS — R42 LIGHTHEADED: ICD-10-CM

## 2023-05-11 DIAGNOSIS — Z95.2 S/P AORTIC VALVE AND MITRAL VALVE REPLACEMENT: ICD-10-CM

## 2023-05-11 DIAGNOSIS — Z95.1 S/P CABG X 2: ICD-10-CM

## 2023-05-11 DIAGNOSIS — R42 DIZZINESS: ICD-10-CM

## 2023-05-11 DIAGNOSIS — Z79.01 CHRONIC ANTICOAGULATION: ICD-10-CM

## 2023-05-11 LAB
ANION GAP SERPL CALCULATED.3IONS-SCNC: 10 MMOL/L (ref 9–17)
BUN SERPL-MCNC: 21 MG/DL (ref 8–23)
BUN/CREAT BLD: 25 (ref 9–20)
CALCIUM SERPL-MCNC: 10.4 MG/DL (ref 8.6–10.4)
CHLORIDE SERPL-SCNC: 108 MMOL/L (ref 98–107)
CO2 SERPL-SCNC: 26 MMOL/L (ref 20–31)
CREAT SERPL-MCNC: 0.84 MG/DL (ref 0.5–0.9)
GFR SERPL CREATININE-BSD FRML MDRD: >60 ML/MIN/1.73M2
GLUCOSE SERPL-MCNC: 97 MG/DL (ref 70–99)
HCT VFR BLD AUTO: 37.7 % (ref 36.3–47.1)
HGB BLD-MCNC: 12.3 G/DL (ref 11.9–15.1)
MCH RBC QN AUTO: 32.3 PG (ref 25.2–33.5)
MCHC RBC AUTO-ENTMCNC: 32.6 G/DL (ref 28.4–34.8)
MCV RBC AUTO: 99 FL (ref 82.6–102.9)
NRBC AUTOMATED: 0 PER 100 WBC
PDW BLD-RTO: 13 % (ref 11.8–14.4)
PLATELET # BLD AUTO: 156 K/UL (ref 138–453)
PMV BLD AUTO: 10 FL (ref 8.1–13.5)
POTASSIUM SERPL-SCNC: 4.4 MMOL/L (ref 3.7–5.3)
RBC # BLD: 3.81 M/UL (ref 3.95–5.11)
SODIUM SERPL-SCNC: 144 MMOL/L (ref 135–144)
WBC # BLD AUTO: 6.4 K/UL (ref 3.5–11.3)

## 2023-05-11 PROCEDURE — 3074F SYST BP LT 130 MM HG: CPT | Performed by: INTERNAL MEDICINE

## 2023-05-11 PROCEDURE — 99214 OFFICE O/P EST MOD 30 MIN: CPT | Performed by: INTERNAL MEDICINE

## 2023-05-11 PROCEDURE — 80048 BASIC METABOLIC PNL TOTAL CA: CPT

## 2023-05-11 PROCEDURE — 3078F DIAST BP <80 MM HG: CPT | Performed by: INTERNAL MEDICINE

## 2023-05-11 PROCEDURE — 93000 ELECTROCARDIOGRAM COMPLETE: CPT | Performed by: INTERNAL MEDICINE

## 2023-05-11 PROCEDURE — 85027 COMPLETE CBC AUTOMATED: CPT

## 2023-05-11 PROCEDURE — 36415 COLL VENOUS BLD VENIPUNCTURE: CPT

## 2023-05-11 PROCEDURE — 1123F ACP DISCUSS/DSCN MKR DOCD: CPT | Performed by: INTERNAL MEDICINE

## 2023-05-11 NOTE — PROGRESS NOTES
Yes,  (68 y.o.) 1   Sc Sex female 1    [id number]  Score  3   Score last updated 2/1/25 1:49 PM EDT  Click here for a link to the UpToDate guideline \"Atrial Fibrillation: Anticoagulation therapy to prevent embolization  Disclaimer: Risk Score calculation is dependent on accuracy of patient problem list and past encounter diagnosis. Anticoagulation: warfarin (Coumadin) take as directed (goal INR 2.5-3.5)   I ordered a Vital Connect monitor for two weeks because of her recurrent palpitations. Atherosclerotic Heart Disease: S/P CABG x2 SVG to LAD and SVG to OM1 in 2008. No chest pain, pressure or tightness. No worsening shortness of breath. Antiplatelet Agent: Continue aspirin 162 mg daily. Beta Blocker: Continue Carvedilol (Coreg) 6.25 mg twice daily. Cholesterol Reduction Therapy: Continue Atorvastatin (Lipitor) 20 mg daily. Additional counseling: I advised them to call our office or go to the emergency room if they developed worsening or persistent chest pain or increased shortness of breath as this could be life threatening. Mitral and aortic valve replacement: Chronic anticoagulation, Currently compensated. No evidence of volume overload. Anticoagulation: Continue Coumadin Clinic for INR, goal INR is 2.5-3.5. Beta Blocker: Continue Carvedilol (Coreg) 6.25 mg twice daily. ACE Inibitor/ARB: Continue losartan (Cozaar) 50 mg BID. Essential Hypertension: Controlled  Beta Blocker: Continue Carvedilol (Coreg) 6.25 mg twice daily. ACE Inibitor/ARB: Continue losartan (Cozaar) 50 mg BID. Hyperlipidemia: Mixed LDL 75 mg/dL on 10/10/2022  Cholesterol Reduction Therapy: Continue Atorvastatin (Lipitor) 20 mg daily. Lightheadedness/dizziness: near syncope  Pharmacologic Therapy: Not indicated at this time.   Nonpharmacologic counseling: Because of her condition, I reminded her to try and keep herself well-hydrated and to take extra time when moving from laying to sitting, sitting to

## 2023-05-11 NOTE — PATIENT INSTRUCTIONS
SURVEY:    You may be receiving a survey from [employer] regarding your visit today. Please complete the survey to enable us to provide the highest quality of care to you and your family. If you cannot score us a very good on any question, please call the office to discuss how we could have made your experience a very good one. Thank you.

## 2023-05-12 ENCOUNTER — TELEPHONE (OUTPATIENT)
Dept: CARDIOLOGY | Age: 68
End: 2023-05-12

## 2023-05-12 NOTE — TELEPHONE ENCOUNTER
----- Message from Brad Lux MD sent at 5/12/2023  7:59 AM EDT -----  Blood work is good.   Thank you

## 2023-05-15 ENCOUNTER — HOSPITAL ENCOUNTER (OUTPATIENT)
Dept: NON INVASIVE DIAGNOSTICS | Age: 68
Discharge: HOME | End: 2023-05-15
Payer: MEDICARE

## 2023-05-15 DIAGNOSIS — R00.1 BRADYCARDIA: ICD-10-CM

## 2023-05-15 PROCEDURE — 93243 EXT ECG>48HR<7D SCAN A/R: CPT

## 2023-05-15 PROCEDURE — 93247 EXT ECG>7D<15D SCAN A/R: CPT

## 2023-05-15 PROCEDURE — 93246 EXT ECG>7D<15D RECORDING: CPT

## 2023-05-15 PROCEDURE — 93242 EXT ECG>48HR<7D RECORDING: CPT

## 2023-05-18 ENCOUNTER — HOSPITAL ENCOUNTER (EMERGENCY)
Age: 68
Discharge: ANOTHER HOSPITAL | End: 2023-05-18
Attending: EMERGENCY MEDICINE
Payer: MEDICARE

## 2023-05-18 ENCOUNTER — OFFICE VISIT (OUTPATIENT)
Dept: CARDIOLOGY | Age: 68
End: 2023-05-18
Payer: MEDICARE

## 2023-05-18 VITALS
RESPIRATION RATE: 18 BRPM | OXYGEN SATURATION: 99 % | SYSTOLIC BLOOD PRESSURE: 156 MMHG | DIASTOLIC BLOOD PRESSURE: 73 MMHG | WEIGHT: 109.8 LBS | HEART RATE: 51 BPM | BODY MASS INDEX: 18.74 KG/M2 | HEIGHT: 64 IN

## 2023-05-18 VITALS
HEART RATE: 86 BPM | OXYGEN SATURATION: 96 % | RESPIRATION RATE: 15 BRPM | TEMPERATURE: 98.3 F | SYSTOLIC BLOOD PRESSURE: 138 MMHG | DIASTOLIC BLOOD PRESSURE: 77 MMHG

## 2023-05-18 DIAGNOSIS — E78.2 MIXED HYPERLIPIDEMIA: ICD-10-CM

## 2023-05-18 DIAGNOSIS — Z79.01 CHRONIC ANTICOAGULATION: ICD-10-CM

## 2023-05-18 DIAGNOSIS — I25.10 ASHD (ARTERIOSCLEROTIC HEART DISEASE): ICD-10-CM

## 2023-05-18 DIAGNOSIS — I10 PRIMARY HYPERTENSION: ICD-10-CM

## 2023-05-18 DIAGNOSIS — Z95.2 S/P AORTIC VALVE AND MITRAL VALVE REPLACEMENT: ICD-10-CM

## 2023-05-18 DIAGNOSIS — I49.5 TACHY-BRADY SYNDROME (HCC): Primary | ICD-10-CM

## 2023-05-18 DIAGNOSIS — I48.0 PAF (PAROXYSMAL ATRIAL FIBRILLATION) (HCC): ICD-10-CM

## 2023-05-18 DIAGNOSIS — Z95.1 S/P CABG X 2: ICD-10-CM

## 2023-05-18 DIAGNOSIS — R42 LIGHTHEADED: ICD-10-CM

## 2023-05-18 DIAGNOSIS — R42 DIZZINESS: ICD-10-CM

## 2023-05-18 LAB
ALBUMIN SERPL-MCNC: 4.7 G/DL (ref 3.5–5.2)
ALBUMIN/GLOB SERPL: 2 {RATIO} (ref 1–2.5)
ALP SERPL-CCNC: 82 U/L (ref 35–104)
ALT SERPL-CCNC: 38 U/L (ref 5–33)
ANION GAP SERPL CALCULATED.3IONS-SCNC: 9 MMOL/L (ref 9–17)
AST SERPL-CCNC: 52 U/L
BASOPHILS # BLD: 0.04 K/UL (ref 0–0.2)
BASOPHILS NFR BLD: 1 % (ref 0–2)
BILIRUB SERPL-MCNC: 0.7 MG/DL (ref 0.3–1.2)
BUN SERPL-MCNC: 23 MG/DL (ref 8–23)
BUN/CREAT SERPL: 28 (ref 9–20)
CALCIUM SERPL-MCNC: 9.9 MG/DL (ref 8.6–10.4)
CHLORIDE SERPL-SCNC: 107 MMOL/L (ref 98–107)
CO2 SERPL-SCNC: 26 MMOL/L (ref 20–31)
CREAT SERPL-MCNC: 0.82 MG/DL (ref 0.5–0.9)
EKG ATRIAL RATE: 144 BPM
EKG Q-T INTERVAL: 336 MS
EKG QRS DURATION: 94 MS
EKG QTC CALCULATION (BAZETT): 490 MS
EKG R AXIS: -72 DEGREES
EKG T AXIS: 92 DEGREES
EKG VENTRICULAR RATE: 128 BPM
EOSINOPHIL # BLD: 0.22 K/UL (ref 0–0.44)
EOSINOPHILS RELATIVE PERCENT: 4 % (ref 1–4)
ERYTHROCYTE [DISTWIDTH] IN BLOOD BY AUTOMATED COUNT: 12.9 % (ref 11.8–14.4)
GFR SERPL CREATININE-BSD FRML MDRD: >60 ML/MIN/1.73M2
GLUCOSE SERPL-MCNC: 100 MG/DL (ref 70–99)
HCT VFR BLD AUTO: 38.1 % (ref 36.3–47.1)
HGB BLD-MCNC: 12.5 G/DL (ref 11.9–15.1)
IMM GRANULOCYTES # BLD AUTO: <0.03 K/UL (ref 0–0.3)
IMM GRANULOCYTES NFR BLD: 0 %
LYMPHOCYTES # BLD: 16 % (ref 24–43)
LYMPHOCYTES NFR BLD: 0.93 K/UL (ref 1.1–3.7)
MAGNESIUM SERPL-MCNC: 2.3 MG/DL (ref 1.6–2.6)
MCH RBC QN AUTO: 32.6 PG (ref 25.2–33.5)
MCHC RBC AUTO-ENTMCNC: 32.8 G/DL (ref 28.4–34.8)
MCV RBC AUTO: 99.2 FL (ref 82.6–102.9)
MONOCYTES NFR BLD: 0.38 K/UL (ref 0.1–1.2)
MONOCYTES NFR BLD: 6 % (ref 3–12)
NEUTROPHILS NFR BLD: 73 % (ref 36–65)
NEUTS SEG NFR BLD: 4.4 K/UL (ref 1.5–8.1)
NRBC AUTOMATED: 0 PER 100 WBC
PLATELET # BLD AUTO: ABNORMAL K/UL (ref 138–453)
PLATELET, FLUORESCENCE: 124 K/UL (ref 138–453)
PLATELETS.RETICULATED NFR BLD AUTO: 2.4 % (ref 1.1–10.3)
POTASSIUM SERPL-SCNC: 4.5 MMOL/L (ref 3.7–5.3)
PROT SERPL-MCNC: 7 G/DL (ref 6.4–8.3)
RBC # BLD AUTO: 3.84 M/UL (ref 3.95–5.11)
SODIUM SERPL-SCNC: 142 MMOL/L (ref 135–144)
TROPONIN I SERPL HS-MCNC: 23 NG/L (ref 0–14)
WBC OTHER # BLD: 6 K/UL (ref 3.5–11.3)

## 2023-05-18 PROCEDURE — 93010 ELECTROCARDIOGRAM REPORT: CPT | Performed by: FAMILY MEDICINE

## 2023-05-18 PROCEDURE — 85025 COMPLETE CBC W/AUTO DIFF WBC: CPT

## 2023-05-18 PROCEDURE — 1123F ACP DISCUSS/DSCN MKR DOCD: CPT | Performed by: INTERNAL MEDICINE

## 2023-05-18 PROCEDURE — 99285 EMERGENCY DEPT VISIT HI MDM: CPT

## 2023-05-18 PROCEDURE — 93005 ELECTROCARDIOGRAM TRACING: CPT | Performed by: EMERGENCY MEDICINE

## 2023-05-18 PROCEDURE — 83735 ASSAY OF MAGNESIUM: CPT

## 2023-05-18 PROCEDURE — 84484 ASSAY OF TROPONIN QUANT: CPT

## 2023-05-18 PROCEDURE — 3074F SYST BP LT 130 MM HG: CPT | Performed by: INTERNAL MEDICINE

## 2023-05-18 PROCEDURE — 80053 COMPREHEN METABOLIC PANEL: CPT

## 2023-05-18 PROCEDURE — 3078F DIAST BP <80 MM HG: CPT | Performed by: INTERNAL MEDICINE

## 2023-05-18 PROCEDURE — 36415 COLL VENOUS BLD VENIPUNCTURE: CPT

## 2023-05-18 PROCEDURE — 99215 OFFICE O/P EST HI 40 MIN: CPT | Performed by: INTERNAL MEDICINE

## 2023-05-18 ASSESSMENT — LIFESTYLE VARIABLES
HOW OFTEN DO YOU HAVE A DRINK CONTAINING ALCOHOL: NEVER
HOW MANY STANDARD DRINKS CONTAINING ALCOHOL DO YOU HAVE ON A TYPICAL DAY: PATIENT DOES NOT DRINK

## 2023-05-18 ASSESSMENT — PAIN - FUNCTIONAL ASSESSMENT: PAIN_FUNCTIONAL_ASSESSMENT: NONE - DENIES PAIN

## 2023-05-18 ASSESSMENT — ENCOUNTER SYMPTOMS: SHORTNESS OF BREATH: 0

## 2023-05-18 NOTE — ED PROVIDER NOTES
[address]      Pt Name: Fran Cortez  MRN: [id number]  [place] 1955  Date of evaluation: 5/18/2023  Provider: Anam iGl MD    CHIEF COMPLAINT       Chief Complaint   Patient presents with    Bradycardia     From Dr. Cleo Goodwinyelena is a 74 y.o. female who presents to the emergency department from the cardiology clinic for evaluation of arrhythmia. Was being seen in follow-up for episodes of palpitation and near syncope. Reports that she has had palpitations for quite some time but began having episodes of lightheadedness and near syncope yesterday. Had 4 episodes of this during the day yesterday. Dr. Shay Holder, cardiology, has a vital connect event report showing multiple pauses. 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 He has reached out to cardiology at [facility] to discuss pacemaker placement as the patient is established there and had recent ablation for atrial fibrillation at that facility. Today, she denies any shortness of breath. No fever. No nausea or vomiting. No other complaints. REVIEW OF SYSTEMS       Review of Systems   Constitutional:  Negative for fever. Respiratory:  Negative for shortness of breath. Cardiovascular:  Positive for palpitations. Negative for chest pain and leg swelling. Neurological:  Positive for light-headedness.        PAST MEDICAL HISTORY     Past Medical History:   Diagnosis Date    Atrial fibrillation [facility])     CAD (coronary artery disease)     CHF (congestive heart failure) ([facility])     Congestive heart failure (CHF) ([facility]     Depression 6/11/2015    H/O mitral valve replacement     H/O tricuspid valve repair     Hypertension     Ischemic colitis ([facility] 2005, 2010    Migraine     Migraine     Migraine     PAF (paroxysmal atrial fibrillation) ([facility])     Rheumatic fever     S/P AVR     SOB (shortness of breath)          SURGICAL

## 2023-05-18 NOTE — ED NOTES
[facility] called stating pt has been accepted at [facility] but waiting for accepting provider and room number     Nash URENA Reser  05/18/23 [id number]

## 2023-05-18 NOTE — PATIENT INSTRUCTIONS
SURVEY:    You may be receiving a survey from [employer] regarding your visit today. Please complete the survey to enable us to provide the highest quality of care to you and your family. If you cannot score us a very good on any question, please call the office to discuss how we could have made your experience a very good one. Thank you.

## 2023-05-22 ENCOUNTER — HOSPITAL ENCOUNTER (OUTPATIENT)
Dept: PHARMACY | Age: 68
Setting detail: THERAPIES SERIES
Discharge: HOME | End: 2023-05-22
Payer: MEDICARE

## 2023-05-22 ENCOUNTER — OFFICE VISIT (OUTPATIENT)
Dept: CARDIOLOGY | Age: 68
End: 2023-05-22
Payer: MEDICARE

## 2023-05-22 VITALS
WEIGHT: 109 LBS | BODY MASS INDEX: 18.71 KG/M2 | SYSTOLIC BLOOD PRESSURE: 165 MMHG | HEART RATE: 107 BPM | DIASTOLIC BLOOD PRESSURE: 94 MMHG

## 2023-05-22 VITALS
BODY MASS INDEX: 18.61 KG/M2 | SYSTOLIC BLOOD PRESSURE: 166 MMHG | HEART RATE: 62 BPM | OXYGEN SATURATION: 98 % | WEIGHT: 109 LBS | RESPIRATION RATE: 18 BRPM | DIASTOLIC BLOOD PRESSURE: 81 MMHG | HEIGHT: 64 IN

## 2023-05-22 DIAGNOSIS — Z95.2 S/P MVR (MITRAL VALVE REPLACEMENT): Primary | ICD-10-CM

## 2023-05-22 DIAGNOSIS — I25.10 ASHD (ARTERIOSCLEROTIC HEART DISEASE): ICD-10-CM

## 2023-05-22 DIAGNOSIS — Z95.0 PACEMAKER: ICD-10-CM

## 2023-05-22 DIAGNOSIS — I48.19 PERSISTENT ATRIAL FIBRILLATION (HCC): ICD-10-CM

## 2023-05-22 DIAGNOSIS — I45.5 SINUS PAUSE: ICD-10-CM

## 2023-05-22 DIAGNOSIS — Z86.79 S/P ABLATION OF ATRIAL FIBRILLATION: ICD-10-CM

## 2023-05-22 DIAGNOSIS — Z95.2 AORTIC VALVE REPLACED: ICD-10-CM

## 2023-05-22 DIAGNOSIS — Z95.2 S/P AORTIC VALVE AND MITRAL VALVE REPLACEMENT: ICD-10-CM

## 2023-05-22 DIAGNOSIS — E78.2 MIXED HYPERLIPIDEMIA: ICD-10-CM

## 2023-05-22 DIAGNOSIS — Z95.1 S/P CABG X 2: ICD-10-CM

## 2023-05-22 DIAGNOSIS — Z98.890 S/P ABLATION OF ATRIAL FIBRILLATION: ICD-10-CM

## 2023-05-22 DIAGNOSIS — Z79.01 CHRONIC ANTICOAGULATION: ICD-10-CM

## 2023-05-22 DIAGNOSIS — I10 PRIMARY HYPERTENSION: ICD-10-CM

## 2023-05-22 LAB — INR BLD: 5.1

## 2023-05-22 PROCEDURE — 3078F DIAST BP <80 MM HG: CPT | Performed by: PHYSICIAN ASSISTANT

## 2023-05-22 PROCEDURE — 99214 OFFICE O/P EST MOD 30 MIN: CPT | Performed by: PHYSICIAN ASSISTANT

## 2023-05-22 PROCEDURE — 85610 PROTHROMBIN TIME: CPT

## 2023-05-22 PROCEDURE — 1123F ACP DISCUSS/DSCN MKR DOCD: CPT | Performed by: PHYSICIAN ASSISTANT

## 2023-05-22 PROCEDURE — 99212 OFFICE O/P EST SF 10 MIN: CPT

## 2023-05-22 PROCEDURE — 3074F SYST BP LT 130 MM HG: CPT | Performed by: PHYSICIAN ASSISTANT

## 2023-05-22 RX ORDER — METOPROLOL TARTRATE 50 MG/1
50 TABLET, FILM COATED ORAL 2 TIMES DAILY
Qty: 180 TABLET | Refills: 1 | Status: SHIPPED | OUTPATIENT
Start: 2023-05-22

## 2023-05-22 NOTE — PROGRESS NOTES
persistent chest pain or increased shortness of breath as this could be life threatening. Mitral and aortic valve replacement: Chronic anticoagulation, Currently compensated. No evidence of volume overload. Anticoagulation: Continue Coumadin Clinic for INR, goal INR is 2.5-3.5. Beta Blocker: STOP Carvedilol (Coreg) and START Metoprolol tartrate (Lopressor) 50 mg bid. ACE Inibitor/ARB: Continue losartan (Cozaar) 50 mg BID. Essential Hypertension: Controlled  Beta Blocker: STOP Carvedilol (Coreg) and START Metoprolol tartrate (Lopressor) 50 mg bid. ACE Inibitor/ARB: Continue losartan (Cozaar) 50 mg BID. Hyperlipidemia: Mixed LDL 75 mg/dL on 10/10/2022  Cholesterol Reduction Therapy: Continue Atorvastatin (Lipitor) 20 mg daily. Dual Chamber Pacemaker:  Indication for Device Placement: Sinus pause  Interrogation Findings: We will plan to recheck their device at their next scheduled appointment date. Follow Up:  I told Ms. Hakeem Marquez to call my office if she had any problems, but otherwise told her to Return in about 1 week (around 5/29/2023) for nurse visit. However, I would be happy to see her sooner should the need arise. Sincerely,  Kaitlin Brown PA-C  [facility] Cardiology Specialist    [address]  [facility], [address] [place] [address]  Phone: [phone], Fax: [phone]     I believe that the risk of significant morbidity and mortality related to the patient's current medical conditions are: high. Approximately 45 minutes were spent during prep work, discussion and exam of the patient, and follow up documentation and all of their questions were answered. The documentation recorded by the scribe, accurately and completely reflects the services I personally performed and the decisions made by me.  Kaitlin Brown PA-C May 23, 2023

## 2023-05-22 NOTE — PATIENT INSTRUCTIONS
SURVEY:    You may be receiving a survey from [employer] regarding your visit today. Please complete the survey to enable us to provide the highest quality of care to you and your family. If you cannot score us a very good on any question, please call the office to discuss how we could have made your experience a very good one. Thank you.

## 2023-05-26 ENCOUNTER — HOSPITAL ENCOUNTER (OUTPATIENT)
Dept: PHARMACY | Age: 68
Setting detail: THERAPIES SERIES
Discharge: HOME | End: 2023-05-26
Payer: MEDICARE

## 2023-05-26 ENCOUNTER — NURSE ONLY (OUTPATIENT)
Dept: CARDIOLOGY | Age: 68
End: 2023-05-26

## 2023-05-26 VITALS
DIASTOLIC BLOOD PRESSURE: 74 MMHG | BODY MASS INDEX: 18.68 KG/M2 | HEART RATE: 67 BPM | SYSTOLIC BLOOD PRESSURE: 166 MMHG | WEIGHT: 108.8 LBS

## 2023-05-26 DIAGNOSIS — I48.91 ATRIAL FIBRILLATION, UNSPECIFIED TYPE (HCC): ICD-10-CM

## 2023-05-26 DIAGNOSIS — Z95.2 AORTIC VALVE REPLACED: ICD-10-CM

## 2023-05-26 DIAGNOSIS — Z95.2 S/P MVR (MITRAL VALVE REPLACEMENT): Primary | ICD-10-CM

## 2023-05-26 DIAGNOSIS — Z51.89 VISIT FOR WOUND CHECK: Primary | ICD-10-CM

## 2023-05-26 LAB — INR BLD: 1.3

## 2023-05-26 PROCEDURE — 85610 PROTHROMBIN TIME: CPT | Performed by: FAMILY MEDICINE

## 2023-05-26 PROCEDURE — 99212 OFFICE O/P EST SF 10 MIN: CPT | Performed by: FAMILY MEDICINE

## 2023-05-26 NOTE — PROGRESS NOTES
[facility] [address] [place] [address]/[place]  Medication Management  ANTICOAGULATION    Referring Provider: Dr Alok Amezcua INR: 2.5-3.5    TODAY'S INR: 1.3    WARFARIN Dosage: 7.5mg x1, 5mg x1 then increase dosing 12% to 5mg MF, 2.5mg all other days    INR (no units)   Date Value   2023 1.3   2023 5.1   2023 3   2023 3.9   2023 3.6   2023 3.1   2023 3.7       Hemoglobin   Date Value Ref Range Status   2023 12.5 11.9 - 15.1 g/dL Final     Hematocrit   Date Value Ref Range Status   2023 38.1 36.3 - 47.1 % Final     ALT   Date Value Ref Range Status   2023 38 (H) 5 - 33 U/L Final     AST   Date Value Ref Range Status   2023 52 (H) <32 U/L Final       Medication changes:  No changes    Notes:    Fingerstick INR drawn per clinic protocol. Patient states no visible blood in urine and no black tarry stool. Denies any missed doses of warfarin. No change in other maintenance medications or in diet. Will recheck INR in 5 days. Patient is feeling well after pacemaker placement. Patient acknowledges working in consult agreement with pharmacist as referred by his/her physician.                   For Pharmacy Admin Tracking Only    Intervention Detail: Adherence Monitorin and Dose Adjustment: 3, reason: Therapy Optimization  Total # of Interventions Recommended: 5  Total # of Interventions Accepted: 5  Time Spent (min): 20      CARLOS ALBERTO AdkinsPh., 2023,2:33 PM

## 2023-05-26 NOTE — PROGRESS NOTES
Dual Chamber Pacemaker:  Pacemaker Indication: Tachy-liliana syndrome- Sinus pause  Pacemaker Incision site:     Appearance of his pacemaker incision: healing appropriately around incision area. Incision Care: Education  Keep the area clean and dry as much as possible and that it was ok for her to shower. Avoid direct water on the site for a few more weeks and especially to avoid scrubbing the area. I told her to watch for signs of infection including increased pain at the site, swelling, drainage, fever or chills and call my office if any of these signs or symptoms developed.      Interrogation Follow up: I iInstructed her to return in about 3 weeks to have the Pacemaker interrogated followed by repeat  interrogations every 6 months via home monitoring with once a year office checks

## 2023-05-31 ENCOUNTER — HOSPITAL ENCOUNTER (OUTPATIENT)
Dept: PHARMACY | Age: 68
Setting detail: THERAPIES SERIES
Discharge: HOME | End: 2023-05-31
Payer: MEDICARE

## 2023-05-31 VITALS
WEIGHT: 106.6 LBS | DIASTOLIC BLOOD PRESSURE: 90 MMHG | HEART RATE: 75 BPM | SYSTOLIC BLOOD PRESSURE: 163 MMHG | BODY MASS INDEX: 18.3 KG/M2

## 2023-05-31 DIAGNOSIS — Z95.2 AORTIC VALVE REPLACED: ICD-10-CM

## 2023-05-31 DIAGNOSIS — I48.91 ATRIAL FIBRILLATION, UNSPECIFIED TYPE (HCC): ICD-10-CM

## 2023-05-31 DIAGNOSIS — Z95.2 S/P MVR (MITRAL VALVE REPLACEMENT): Primary | ICD-10-CM

## 2023-05-31 LAB — INR BLD: 2.3

## 2023-05-31 PROCEDURE — 85610 PROTHROMBIN TIME: CPT | Performed by: FAMILY MEDICINE

## 2023-05-31 PROCEDURE — 99212 OFFICE O/P EST SF 10 MIN: CPT | Performed by: FAMILY MEDICINE

## 2023-05-31 NOTE — PATIENT INSTRUCTIONS
Increase your dosing to take warfarin 5mg (1 tablet) Monday, Wednesday and Friday and 2.5mg (1/2 tablet) all other days. Continue to monitor urine and stool for signs and symptoms of bleeding. Please notify the clinic of any medication changes. Please remember to bring all medications (both prescription and OTC) to your next visit. Kindly notify the clinic if you are unable to make to your next appointment. Continue current dose of warfarin as instructed on dosing calendar provided.

## 2023-05-31 NOTE — PROGRESS NOTES
[facility] [address] [place] [address]/[place]  Medication Management  ANTICOAGULATION    Referring Provider: Dr Margi Seals INR: 2.5-3.5    TODAY'S INR: 2.3    WARFARIN Dosage: resume previously stable dosing of 5mg MWF, 2.5mg all other days    INR (no units)   Date Value   2023 2.3   2023 1.3   2023 5.1   2023 3   2023 3.9   2023 3.6   2023 3.1       Hemoglobin   Date Value Ref Range Status   2023 12.5 11.9 - 15.1 g/dL Final     Hematocrit   Date Value Ref Range Status   2023 38.1 36.3 - 47.1 % Final     ALT   Date Value Ref Range Status   2023 38 (H) 5 - 33 U/L Final     AST   Date Value Ref Range Status   2023 52 (H) <32 U/L Final       Medication changes:  No changes    Notes:    Fingerstick INR drawn per clinic protocol. Patient states no visible blood in urine and no black tarry stool. Denies any missed doses of warfarin. No change in other maintenance medications or in diet. Will recheck INR in 2 weeks following appt with Dr Jeremiah Damon. Patient states she is not feeling as good as she was a week ago but doing well overall. Patient acknowledges working in consult agreement with pharmacist as referred by his/her physician.                   For Pharmacy Admin Tracking Only    Intervention Detail: Adherence Monitorin and Dose Adjustment: 1, reason: Therapy Optimization  Total # of Interventions Recommended: 3  Total # of Interventions Accepted: 3  Time Spent (min):  25    Jamaica Nuñez R.Ph., 2023,12:50 PM

## 2023-06-09 ENCOUNTER — OFFICE VISIT (OUTPATIENT)
Dept: CARDIOLOGY | Age: 68
End: 2023-06-09

## 2023-06-09 VITALS
HEIGHT: 64 IN | WEIGHT: 106 LBS | DIASTOLIC BLOOD PRESSURE: 76 MMHG | BODY MASS INDEX: 18.1 KG/M2 | OXYGEN SATURATION: 99 % | RESPIRATION RATE: 18 BRPM | HEART RATE: 89 BPM | SYSTOLIC BLOOD PRESSURE: 134 MMHG

## 2023-06-09 DIAGNOSIS — Z95.1 S/P CABG X 2: ICD-10-CM

## 2023-06-09 DIAGNOSIS — R07.89 CHEST DISCOMFORT: ICD-10-CM

## 2023-06-09 DIAGNOSIS — I25.10 ASHD (ARTERIOSCLEROTIC HEART DISEASE): ICD-10-CM

## 2023-06-09 DIAGNOSIS — Z95.0 PACEMAKER: ICD-10-CM

## 2023-06-09 DIAGNOSIS — I45.5 SINUS PAUSE: ICD-10-CM

## 2023-06-09 DIAGNOSIS — E78.2 MIXED HYPERLIPIDEMIA: ICD-10-CM

## 2023-06-09 DIAGNOSIS — I25.810 CORONARY ARTERY DISEASE INVOLVING CORONARY BYPASS GRAFT OF NATIVE HEART WITHOUT ANGINA PECTORIS: ICD-10-CM

## 2023-06-09 DIAGNOSIS — Z95.2 S/P AORTIC VALVE AND MITRAL VALVE REPLACEMENT: ICD-10-CM

## 2023-06-09 DIAGNOSIS — I47.20 VT (VENTRICULAR TACHYCARDIA) (HCC): ICD-10-CM

## 2023-06-09 DIAGNOSIS — D68.69 SECONDARY HYPERCOAGULABLE STATE (HCC): ICD-10-CM

## 2023-06-09 DIAGNOSIS — Z95.2 S/P AVR (AORTIC VALVE REPLACEMENT): ICD-10-CM

## 2023-06-09 DIAGNOSIS — I10 ESSENTIAL HYPERTENSION: ICD-10-CM

## 2023-06-09 DIAGNOSIS — I48.19 PERSISTENT ATRIAL FIBRILLATION (HCC): ICD-10-CM

## 2023-06-09 DIAGNOSIS — Z79.01 CHRONIC ANTICOAGULATION: ICD-10-CM

## 2023-06-09 DIAGNOSIS — I48.0 PAF (PAROXYSMAL ATRIAL FIBRILLATION) (HCC): ICD-10-CM

## 2023-06-09 RX ORDER — CARVEDILOL 6.25 MG/1
TABLET ORAL
Qty: 180 TABLET | Refills: 0 | OUTPATIENT
Start: 2023-06-09

## 2023-06-09 NOTE — PATIENT INSTRUCTIONS
SURVEY:    You may be receiving a survey from [employer] regarding your visit today. Please complete the survey to enable us to provide the highest quality of care to you and your family. If you cannot score us a very good on any question, please call the office to discuss how we could have made your experience a very good one. Thank you.

## 2023-06-09 NOTE — PROGRESS NOTES
CYST REMOVAL Right     PRE-MALIGNANT / BENIGN SKIN LESION EXCISION Left 13    face    TONSILLECTOMY AND ADENOIDECTOMY      TRANSESOPHAGEAL ECHOCARDIOGRAM  2017    TUBAL LIGATION      Social History:  Social History     Tobacco Use    Smoking status: Former     Packs/day: 0.50     Years: 20.00     Pack years: 10.00     Types: Cigarettes     Quit date: 3/28/1993     Years since quittin.2    Smokeless tobacco: Never   Substance Use Topics    Alcohol use: No    Drug use: No        CURRENT MEDICATIONS:        Outpatient Medications Marked as Taking for the 23 encounter (Office Visit) with Sharad Gonzalez PA-C   Medication Sig Dispense Refill    metoprolol tartrate (LOPRESSOR) 50 MG tablet Take 1 tablet by mouth 2 times daily 180 tablet 1    Magnesium Oxide (MAGNESIUM-OXIDE) 250 MG TABS tablet Take 1 tablet by mouth daily      polyethylene glycol (GLYCOLAX) 17 GM/SCOOP powder Take 17 g by mouth daily as needed      warfarin (COUMADIN) 5 MG tablet TAKE 2.5MG (1/2 TABLET) EVERY TUESDAY, THURSDAY, & SATURDAY THEN TAKE 5MG (1 TABLET) , MONDAY, WEDNESDAY AND FRIDAY (Patient taking differently: 5mg MF, 2.5mg TWRSS  Coumadin Clinic) 90 tablet 3    atorvastatin (LIPITOR) 20 MG tablet Take 1 tablet by mouth once daily 90 tablet 3    losartan (COZAAR) 50 MG tablet Take 1 tablet by mouth in the morning and at bedtime 180 tablet 3    Biotin 50406 MCG TABS Take 1 tablet by mouth daily      oxybutynin (DITROPAN-XL) 10 MG extended release tablet Take 1 tablet by mouth daily Uses PRN      Multiple Vitamins-Minerals (THERAPEUTIC MULTIVITAMIN-MINERALS) tablet Take 1 tablet by mouth daily      omeprazole (PRILOSEC) 20 MG capsule Take 1 capsule by mouth Daily 30 capsule 3    aspirin 81 MG tablet Take 2 tablets by mouth daily         FAMILY HISTORY: family history includes Cancer (age of onset: 40) in her mother; Cancer (age of onset: 61) in her sister; Diabetes in her father.      Physical Examination:     BP
Diabetes in her father. Physical Examination:     /76 (Site: Left Upper Arm, Position: Sitting, Cuff Size: Small Adult)   Pulse 89   Resp 18   Ht 5' 4\" (1.626 m)   Wt 106 lb (48.1 kg)   SpO2 99%   BMI 18.19 kg/m²  Body mass index is 18.19 kg/m². Constitutional: She appeared oriented to person and place. She appears well-developed and well-nourished. In no acute distress. HEENT: Normocephalic and atraumatic. No JVD present. Carotid bruit is not present. No mass and no thyromegaly present. No lymphadenopathy noted. Cardiovascular: Normal rate, regular rhythm, mechanical S1. Loud S2. Short ejection systolic murmur heard at the second left intercostal space without radiation. Pulmonary/Chest: Effort normal and breath sounds normal. No respiratory distress. She has no wheezes, rhonchi or rales. Abdominal: Soft, non-tender. She exhibits no organomegaly, mass or bruit. Extremities: No edema. No cyanosis or clubbing. 2+ radial and carotid pulses. Distal extremity pulses: 2+ bilaterally. Neurological: Alertness and orientation as per Constitutional exam. No evidence of gross cranial nerve deficit. Coordination appeared normal.   Skin: Skin is warm and dry. There is no rash or diaphoresis. Pacemaker site tender to touch. No erythema, edema, or ecchymosis. Psychiatric: She has a normal mood and affect. Her speech is normal and behavior is normal.      MOST RECENT LABS ON RECORD:   Lab Results   Component Value Date    WBC 6.0 05/18/2023    HGB 12.5 05/18/2023    HCT 38.1 05/18/2023    PLT See Reflexed IPF Result 05/18/2023    CHOL 193 10/10/2022    TRIG 60 10/10/2022     10/10/2022    ALT 38 (H) 05/18/2023    AST 52 (H) 05/18/2023     05/18/2023    K 4.5 05/18/2023     05/18/2023    CREATININE 0.82 05/18/2023    BUN 23 05/18/2023    CO2 26 05/18/2023    TSH 1.41 04/01/2022    INR 2.3 05/31/2023       ASSESSMENT:     1. Chest discomfort    2.  Persistent atrial fibrillation ([address]

## 2023-06-21 ENCOUNTER — TELEPHONE (OUTPATIENT)
Dept: CARDIOLOGY | Age: 68
End: 2023-06-21

## 2023-06-21 NOTE — TELEPHONE ENCOUNTER
----- Message from Sabino Vásquez PA-C sent at 6/21/2023  8:51 AM EDT -----  Please let them know their stress test looked good, it was low risk. Will discuss at follow up. Thanks.

## 2023-07-06 ENCOUNTER — HOSPITAL ENCOUNTER (OUTPATIENT)
Dept: PHARMACY | Age: 68
Setting detail: THERAPIES SERIES
Discharge: HOME | End: 2023-07-06
Payer: MEDICARE

## 2023-07-06 VITALS
HEART RATE: 82 BPM | SYSTOLIC BLOOD PRESSURE: 152 MMHG | DIASTOLIC BLOOD PRESSURE: 89 MMHG | BODY MASS INDEX: 18.02 KG/M2 | WEIGHT: 105 LBS

## 2023-07-06 DIAGNOSIS — Z95.2 AORTIC VALVE REPLACED: ICD-10-CM

## 2023-07-06 DIAGNOSIS — Z95.2 S/P MVR (MITRAL VALVE REPLACEMENT): Primary | ICD-10-CM

## 2023-07-06 LAB — INR BLD: 3.1

## 2023-07-06 PROCEDURE — 99211 OFF/OP EST MAY X REQ PHY/QHP: CPT

## 2023-07-06 PROCEDURE — 85610 PROTHROMBIN TIME: CPT

## 2023-07-06 NOTE — PROGRESS NOTES
[address] [facility]  Medication Management  ANTICOAGULATION    Referring Provider: Dr Dominic Castanon INR: 2.5 - 3.5     TODAY'S INR: 3.1     WARFARIN Dosage: Continue warfarin whole 5 mg tablet MWF and half tablet for 2.5 mg all other days. INR (no units)   Date Value   2023 3.3   2023 2.3   2023 1.3   2023 5.1   2023 3   2023 3.9   2023 3.6       Hemoglobin   Date Value Ref Range Status   2023 12.5 11.9 - 15.1 g/dL Final     Hematocrit   Date Value Ref Range Status   2023 38.1 36.3 - 47.1 % Final     ALT   Date Value Ref Range Status   2023 38 (H) 5 - 33 U/L Final     AST   Date Value Ref Range Status   2023 52 (H) <32 U/L Final     Medication changes:  none    Notes:    Fingerstick INR drawn per clinic protocol. Patient states no visible blood in urine and no black tarry stool. Denies any missed doses of warfarin. No change in other maintenance medications or in diet. Will recheck INR in 6 weeks. Patient acknowledges working in consult agreement with pharmacist as referred by his/her physician.                   For Pharmacy Admin Tracking Only    Intervention Detail: Adherence Monitorin  Total # of Interventions Recommended: 0  Total # of Interventions Accepted: 0  Time Spent (min): [address] [facility], [address] [place] [address], PharmYAJAIRA

## 2023-08-15 ENCOUNTER — HOSPITAL ENCOUNTER (OUTPATIENT)
Dept: PHARMACY | Age: 68
Setting detail: THERAPIES SERIES
Discharge: HOME | End: 2023-08-15
Payer: MEDICARE

## 2023-08-15 VITALS
WEIGHT: 106.6 LBS | BODY MASS INDEX: 18.3 KG/M2 | SYSTOLIC BLOOD PRESSURE: 174 MMHG | DIASTOLIC BLOOD PRESSURE: 77 MMHG | HEART RATE: 67 BPM

## 2023-08-15 DIAGNOSIS — I48.91 ATRIAL FIBRILLATION, UNSPECIFIED TYPE (HCC): ICD-10-CM

## 2023-08-15 DIAGNOSIS — Z95.2 AORTIC VALVE REPLACED: ICD-10-CM

## 2023-08-15 DIAGNOSIS — Z95.2 S/P MVR (MITRAL VALVE REPLACEMENT): Primary | ICD-10-CM

## 2023-08-15 LAB — INR BLD: 2.8

## 2023-08-15 PROCEDURE — 99211 OFF/OP EST MAY X REQ PHY/QHP: CPT | Performed by: FAMILY MEDICINE

## 2023-08-15 PROCEDURE — 85610 PROTHROMBIN TIME: CPT | Performed by: FAMILY MEDICINE

## 2023-08-15 NOTE — PROGRESS NOTES
[facility]  [facility]  ANTICOAGULATION    Referring Provider: Dr Pantera Altamirano INR: 2.5-3.5    TODAY'S INR: 2.8    WARFARIN Dosage: continue 5mg MWF, 2.5mg all other days    INR (no units)   Date Value   08/15/2023 2.8   2023 3.1   2023 3.3   2023 2.3   2023 1.3   2023 5.1   2023 3       Hemoglobin   Date Value Ref Range Status   2023 12.5 11.9 - 15.1 g/dL Final     Hematocrit   Date Value Ref Range Status   2023 38.1 36.3 - 47.1 % Final     ALT   Date Value Ref Range Status   2023 38 (H) 5 - 33 U/L Final     AST   Date Value Ref Range Status   2023 52 (H) <32 U/L Final       Medication changes:  No changes    Notes:    Fingerstick INR drawn per clinic protocol. Patient states no visible blood in urine and no black tarry stool. Denies any missed doses of warfarin. No change in other maintenance medications or in diet. Will recheck INR in 6 weeks. Patient states she feels she is struggling with some depression since her pacemaker placement. Patient has appt with Dr Kae Donnelly on . I have encouraged her to contact her PCP but she is resistant at this time. Patient acknowledges working in consult agreement with pharmacist as referred by his/her physician.                   For Pharmacy Admin Tracking Only    Intervention Detail: Adherence Monitorin  Total # of Interventions Recommended: 2  Total # of Interventions Accepted: 2  Time Spent (min): 20      MAMIE Hoffmann.Ph., 8/15/2023,12:19 PM

## 2023-09-13 RX ORDER — LOSARTAN POTASSIUM 50 MG/1
50 TABLET ORAL 2 TIMES DAILY
Qty: 180 TABLET | Refills: 0 | Status: SHIPPED | OUTPATIENT
Start: 2023-09-13

## 2023-09-26 ENCOUNTER — HOSPITAL ENCOUNTER (OUTPATIENT)
Dept: PHARMACY | Age: 68
Setting detail: THERAPIES SERIES
Discharge: HOME | End: 2023-09-26
Payer: MEDICARE

## 2023-09-26 VITALS
WEIGHT: 108.8 LBS | HEART RATE: 71 BPM | SYSTOLIC BLOOD PRESSURE: 136 MMHG | DIASTOLIC BLOOD PRESSURE: 67 MMHG | BODY MASS INDEX: 18.68 KG/M2

## 2023-09-26 DIAGNOSIS — Z95.2 S/P MVR (MITRAL VALVE REPLACEMENT): Primary | ICD-10-CM

## 2023-09-26 DIAGNOSIS — I48.91 ATRIAL FIBRILLATION, UNSPECIFIED TYPE (HCC): ICD-10-CM

## 2023-09-26 DIAGNOSIS — Z95.2 AORTIC VALVE REPLACED: ICD-10-CM

## 2023-09-26 LAB — INR BLD: 3

## 2023-09-26 PROCEDURE — 99211 OFF/OP EST MAY X REQ PHY/QHP: CPT | Performed by: FAMILY MEDICINE

## 2023-09-26 PROCEDURE — 85610 PROTHROMBIN TIME: CPT | Performed by: FAMILY MEDICINE

## 2023-09-26 NOTE — PROGRESS NOTES
[facility]  [facility]  ANTICOAGULATION    Referring Provider: Dr Jose J Lorenz INR: 2.5-3.5    TODAY'S INR: 3.0    WARFARIN Dosage: continue 5mg MWF, 2.5mg all other days    INR (no units)   Date Value   2023 3   08/15/2023 2.8   2023 3.1   2023 3.3   2023 2.3   2023 1.3   2023 5.1       Hemoglobin   Date Value Ref Range Status   2023 12.5 11.9 - 15.1 g/dL Final     Hematocrit   Date Value Ref Range Status   2023 38.1 36.3 - 47.1 % Final     ALT   Date Value Ref Range Status   2023 38 (H) 5 - 33 U/L Final     AST   Date Value Ref Range Status   2023 52 (H) <32 U/L Final       Medication changes:  No change    Notes:    Fingerstick INR drawn per clinic protocol. Patient states no visible blood in urine and no black tarry stool. Denies any missed doses of warfarin. No change in other maintenance medications or in diet. Will recheck INR in 6 weeks. Patient has routine check up with Dr Mic Muhammad at [facility] on 10/3/23. Patient acknowledges working in consult agreement with pharmacist as referred by his/her physician.                   For Pharmacy Admin Tracking Only    Intervention Detail: Adherence Monitorin  Total # of Interventions Recommended: 2  Total # of Interventions Accepted: 2  Time Spent (min): 20      Katy Browne R.Ph., 2023,11:15 AM

## 2023-09-26 NOTE — PATIENT INSTRUCTIONS
Continue to take warfarin 5mg (1 tablet) Monday Wednesday and Friday and 2.5mg (1/2 tablet) all other days. Continue to monitor urine and stool for signs and symptoms of bleeding. Please notify the clinic of any medication changes. Please remember to bring all medications (both prescription and OTC) to your next visit. Kindly notify the clinic if you are unable to make to your next appointment. Continue current dose of warfarin as instructed on dosing calendar provided.

## 2023-11-03 RX ORDER — METOPROLOL TARTRATE 50 MG/1
50 TABLET, FILM COATED ORAL 2 TIMES DAILY
Qty: 180 TABLET | Refills: 3 | Status: SHIPPED | OUTPATIENT
Start: 2023-11-03

## 2023-11-07 ENCOUNTER — HOSPITAL ENCOUNTER (OUTPATIENT)
Dept: PHARMACY | Age: 68
Setting detail: THERAPIES SERIES
Discharge: HOME | End: 2023-11-07
Payer: MEDICARE

## 2023-11-07 VITALS
SYSTOLIC BLOOD PRESSURE: 150 MMHG | WEIGHT: 106 LBS | HEART RATE: 90 BPM | DIASTOLIC BLOOD PRESSURE: 78 MMHG | BODY MASS INDEX: 18.19 KG/M2

## 2023-11-07 DIAGNOSIS — Z95.2 S/P MVR (MITRAL VALVE REPLACEMENT): Primary | ICD-10-CM

## 2023-11-07 DIAGNOSIS — Z95.2 AORTIC VALVE REPLACED: ICD-10-CM

## 2023-11-07 LAB — INR BLD: 2.6

## 2023-11-07 PROCEDURE — 99211 OFF/OP EST MAY X REQ PHY/QHP: CPT

## 2023-11-07 PROCEDURE — 85610 PROTHROMBIN TIME: CPT

## 2023-11-07 NOTE — PROGRESS NOTES
[facility]  [facility]  ANTICOAGULATION    Referring Provider: Dr. Evert Pastor INR: 2.5-3.5    TODAY'S INR: 2.6    WARFARIN Dosage: Continue warfarin 5 mg po every MWF; 2.5 mg po all other days    INR (no units)   Date Value   2023 2.6   2023 3   08/15/2023 2.8   2023 3.1   2023 3.3   2023 2.3   2023 1.3       Hemoglobin   Date Value Ref Range Status   2023 12.5 11.9 - 15.1 g/dL Final     Hematocrit   Date Value Ref Range Status   2023 38.1 36.3 - 47.1 % Final     ALT   Date Value Ref Range Status   2023 38 (H) 5 - 33 U/L Final     AST   Date Value Ref Range Status   2023 52 (H) <32 U/L Final       Medication changes:  No changes    Notes:    Fingerstick INR drawn per clinic protocol. Patient states no visible blood in urine and no black tarry stool. Denies any missed doses of warfarin. No change in other maintenance medications or in diet. Will recheck INR in 6 weeks. Patient acknowledges working in consult agreement with pharmacist as referred by his/her physician. For Pharmacy Admin Tracking Only    Intervention Detail: Adherence Monitorin  Total # of Interventions Recommended: 1  Total # of Interventions Accepted: 1  Time Spent (min): [address] [facility].  Sandra Conway [facility]

## 2023-11-09 ENCOUNTER — HOSPITAL ENCOUNTER (OUTPATIENT)
Age: 68
Discharge: HOME | End: 2023-11-09
Payer: MEDICARE

## 2023-11-09 LAB
ALBUMIN SERPL-MCNC: 5 G/DL (ref 3.5–5.2)
ALBUMIN/GLOB SERPL: 2 {RATIO} (ref 1–2.5)
ALP SERPL-CCNC: 76 U/L (ref 35–104)
ALT SERPL-CCNC: 36 U/L (ref 5–33)
ANION GAP SERPL CALCULATED.3IONS-SCNC: 11 MMOL/L (ref 9–17)
AST SERPL-CCNC: 41 U/L
BASOPHILS # BLD: 0.03 K/UL (ref 0–0.2)
BASOPHILS NFR BLD: 1 % (ref 0–2)
BILIRUB SERPL-MCNC: 0.6 MG/DL (ref 0.3–1.2)
BUN SERPL-MCNC: 22 MG/DL (ref 8–23)
BUN/CREAT SERPL: 28 (ref 9–20)
CALCIUM SERPL-MCNC: 10 MG/DL (ref 8.6–10.4)
CHLORIDE SERPL-SCNC: 103 MMOL/L (ref 98–107)
CHOLEST SERPL-MCNC: 191 MG/DL
CHOLESTEROL/HDL RATIO: 1.9
CO2 SERPL-SCNC: 26 MMOL/L (ref 20–31)
CREAT SERPL-MCNC: 0.8 MG/DL (ref 0.5–0.9)
CRP SERPL HS-MCNC: <3 MG/L (ref 0–5)
EOSINOPHIL # BLD: 0.11 K/UL (ref 0–0.44)
EOSINOPHILS RELATIVE PERCENT: 2 % (ref 1–4)
ERYTHROCYTE [DISTWIDTH] IN BLOOD BY AUTOMATED COUNT: 12.4 % (ref 11.8–14.4)
ERYTHROCYTE [SEDIMENTATION RATE] IN BLOOD BY PHOTOMETRIC METHOD: <1 MM/HR (ref 0–30)
GFR SERPL CREATININE-BSD FRML MDRD: >60 ML/MIN/1.73M2
GLUCOSE SERPL-MCNC: 97 MG/DL (ref 70–99)
HCT VFR BLD AUTO: 41.8 % (ref 36.3–47.1)
HDLC SERPL-MCNC: 98 MG/DL
HGB BLD-MCNC: 13.8 G/DL (ref 11.9–15.1)
IMM GRANULOCYTES # BLD AUTO: <0.03 K/UL (ref 0–0.3)
IMM GRANULOCYTES NFR BLD: 0 %
LDLC SERPL CALC-MCNC: 77 MG/DL (ref 0–130)
LYMPHOCYTES NFR BLD: 0.99 K/UL (ref 1.1–3.7)
LYMPHOCYTES RELATIVE PERCENT: 16 % (ref 24–43)
MCH RBC QN AUTO: 32.7 PG (ref 25.2–33.5)
MCHC RBC AUTO-ENTMCNC: 33 G/DL (ref 28.4–34.8)
MCV RBC AUTO: 99.1 FL (ref 82.6–102.9)
MONOCYTES NFR BLD: 0.35 K/UL (ref 0.1–1.2)
MONOCYTES NFR BLD: 6 % (ref 3–12)
NEUTROPHILS NFR BLD: 76 % (ref 36–65)
NEUTS SEG NFR BLD: 4.8 K/UL (ref 1.5–8.1)
NRBC BLD-RTO: 0 PER 100 WBC
PLATELET # BLD AUTO: ABNORMAL K/UL (ref 138–453)
PLATELET, FLUORESCENCE: 137 K/UL (ref 138–453)
PLATELETS.RETICULATED NFR BLD AUTO: 3.3 % (ref 1.1–10.3)
POTASSIUM SERPL-SCNC: 4.2 MMOL/L (ref 3.7–5.3)
PROT SERPL-MCNC: 7.5 G/DL (ref 6.4–8.3)
RBC # BLD AUTO: 4.22 M/UL (ref 3.95–5.11)
SODIUM SERPL-SCNC: 140 MMOL/L (ref 135–144)
TRIGL SERPL-MCNC: 81 MG/DL
WBC OTHER # BLD: 6.3 K/UL (ref 3.5–11.3)

## 2023-11-09 PROCEDURE — 80053 COMPREHEN METABOLIC PANEL: CPT

## 2023-11-09 PROCEDURE — 86140 C-REACTIVE PROTEIN: CPT

## 2023-11-09 PROCEDURE — 36415 COLL VENOUS BLD VENIPUNCTURE: CPT

## 2023-11-09 PROCEDURE — 85652 RBC SED RATE AUTOMATED: CPT

## 2023-11-09 PROCEDURE — 85025 COMPLETE CBC W/AUTO DIFF WBC: CPT

## 2023-11-09 PROCEDURE — 80061 LIPID PANEL: CPT

## 2023-11-15 RX ORDER — ATORVASTATIN CALCIUM 20 MG/1
TABLET, FILM COATED ORAL
Qty: 90 TABLET | Refills: 3 | Status: SHIPPED | OUTPATIENT
Start: 2023-11-15

## 2023-11-21 ENCOUNTER — HOSPITAL ENCOUNTER (OUTPATIENT)
Dept: CT IMAGING | Age: 68
Discharge: HOME | End: 2023-11-23
Payer: MEDICARE

## 2023-11-21 DIAGNOSIS — R10.11 RIGHT UPPER QUADRANT PAIN: ICD-10-CM

## 2023-11-21 DIAGNOSIS — R10.11 ABDOMINAL PAIN, BILATERAL UPPER QUADRANT: ICD-10-CM

## 2023-11-21 DIAGNOSIS — R10.12 ABDOMINAL PAIN, BILATERAL UPPER QUADRANT: ICD-10-CM

## 2023-11-21 PROCEDURE — 6360000004 HC RX CONTRAST MEDICATION: Performed by: NURSE PRACTITIONER

## 2023-11-21 PROCEDURE — 74177 CT ABD & PELVIS W/CONTRAST: CPT

## 2023-11-21 RX ADMIN — IOPAMIDOL 75 ML: 755 INJECTION, SOLUTION INTRAVENOUS at 14:50

## 2023-11-21 RX ADMIN — IOPAMIDOL 18 ML: 755 INJECTION, SOLUTION INTRAVENOUS at 14:49

## 2023-12-12 ENCOUNTER — NURSE ONLY (OUTPATIENT)
Dept: CARDIOLOGY | Age: 68
End: 2023-12-12
Payer: MEDICARE

## 2023-12-12 DIAGNOSIS — I45.5 SINUS PAUSE: Primary | ICD-10-CM

## 2023-12-12 DIAGNOSIS — Z95.0 PACEMAKER: ICD-10-CM

## 2023-12-12 DIAGNOSIS — I49.5 TACHY-BRADY SYNDROME (HCC): ICD-10-CM

## 2023-12-12 PROCEDURE — 93294 REM INTERROG EVL PM/LDLS PM: CPT | Performed by: INTERNAL MEDICINE

## 2023-12-13 RX ORDER — LOSARTAN POTASSIUM 50 MG/1
50 TABLET ORAL 2 TIMES DAILY
Qty: 180 TABLET | Refills: 0 | Status: SHIPPED | OUTPATIENT
Start: 2023-12-13

## 2023-12-14 ENCOUNTER — OFFICE VISIT (OUTPATIENT)
Dept: CARDIOLOGY | Age: 68
End: 2023-12-14
Payer: MEDICARE

## 2023-12-14 VITALS
RESPIRATION RATE: 18 BRPM | SYSTOLIC BLOOD PRESSURE: 136 MMHG | DIASTOLIC BLOOD PRESSURE: 94 MMHG | OXYGEN SATURATION: 99 % | BODY MASS INDEX: 18.78 KG/M2 | HEIGHT: 64 IN | WEIGHT: 110 LBS | HEART RATE: 69 BPM

## 2023-12-14 DIAGNOSIS — Z95.1 S/P CABG X 2: ICD-10-CM

## 2023-12-14 DIAGNOSIS — E78.2 MIXED HYPERLIPIDEMIA: ICD-10-CM

## 2023-12-14 DIAGNOSIS — Z98.890 S/P ABLATION OF ATRIAL FIBRILLATION: ICD-10-CM

## 2023-12-14 DIAGNOSIS — I49.5 TACHY-BRADY SYNDROME (HCC): Primary | ICD-10-CM

## 2023-12-14 DIAGNOSIS — I25.10 ASHD (ARTERIOSCLEROTIC HEART DISEASE): ICD-10-CM

## 2023-12-14 DIAGNOSIS — Z86.79 S/P ABLATION OF ATRIAL FIBRILLATION: ICD-10-CM

## 2023-12-14 DIAGNOSIS — I48.0 PAF (PAROXYSMAL ATRIAL FIBRILLATION) (HCC): ICD-10-CM

## 2023-12-14 DIAGNOSIS — Z79.01 CHRONIC ANTICOAGULATION: ICD-10-CM

## 2023-12-14 DIAGNOSIS — Z95.2 S/P AORTIC VALVE AND MITRAL VALVE REPLACEMENT: ICD-10-CM

## 2023-12-14 DIAGNOSIS — I10 ESSENTIAL HYPERTENSION: ICD-10-CM

## 2023-12-14 DIAGNOSIS — R07.89 CHEST DISCOMFORT: ICD-10-CM

## 2023-12-14 DIAGNOSIS — Z95.0 CARDIAC PACEMAKER IN SITU: ICD-10-CM

## 2023-12-14 PROCEDURE — 3075F SYST BP GE 130 - 139MM HG: CPT | Performed by: INTERNAL MEDICINE

## 2023-12-14 PROCEDURE — 1123F ACP DISCUSS/DSCN MKR DOCD: CPT | Performed by: INTERNAL MEDICINE

## 2023-12-14 PROCEDURE — 93000 ELECTROCARDIOGRAM COMPLETE: CPT | Performed by: INTERNAL MEDICINE

## 2023-12-14 PROCEDURE — 3080F DIAST BP >= 90 MM HG: CPT | Performed by: INTERNAL MEDICINE

## 2023-12-14 PROCEDURE — 99214 OFFICE O/P EST MOD 30 MIN: CPT | Performed by: INTERNAL MEDICINE

## 2023-12-14 NOTE — PATIENT INSTRUCTIONS
SURVEY:    You may be receiving a survey from [employer] regarding your visit today. Please complete the survey to enable us to provide the highest quality of care to you and your family. If you cannot score us a very good on any question, please call the office to discuss how we could have made your experience a very good one. Thank you.

## 2023-12-14 NOTE — PROGRESS NOTES
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 Cardiac pacemaker in situ    3. ASHD (arteriosclerotic heart disease)    4. S/P CABG x 2    5. Essential hypertension    6. Chronic anticoagulation    7. PAF (paroxysmal atrial fibrillation) ([address])    8. S/P aortic valve and mitral valve replacement    9. Mixed hyperlipidemia    10. S/P ablation of atrial fibrillation    11. Chest discomfort      PLAN:        Proxysmal Atrial Fibrillation: Currently maintaining sinus rhythm. S/P surgical maze procedure in 2008. Paroxysmal atrial fibrillation noted on prior Cam monitor. S/P Ablation on 4/26/2023 at the [facility]  She has pauses up to 4.5 seconds associated with near passing out episodes. 5/18/2023: Dr. Rain Garcia at the [facility] placed a dual chamber pacemaker. Beta Blocker: Continue Metoprolol tartrate (Lopressor) 50 mg bid. Stroke Risk: CHADS2-VASc Score: greater than 2 (2.2% stroke risk)  [id number] Score for Atrial Fibrillation Stroke Risk   Risk   Factors  Component Value   C CHF No 0   H HTN Yes 1   A2 Age >= 76 No,  (70 y.o.) 0   D DM No 0   S2 Prior Stroke/TIA No 0   V Vascular Disease No 0   A Age 77-78 Yes,  (70 y.o.) 1   Sc Sex female 1    [id number]  Score  3   Score last updated 8/6/34 5:59 PM EDT  Click here for a link to the UpToDate guideline \"Atrial Fibrillation: Anticoagulation therapy to prevent embolization  Disclaimer: Risk Score calculation is dependent on accuracy of patient problem list and past encounter diagnosis.    Anticoagulation: warfarin (Coumadin) take as directed (goal INR

## 2024-01-17 ENCOUNTER — APPOINTMENT (OUTPATIENT)
Age: 69
End: 2024-01-17
Payer: MEDICARE

## 2024-01-17 ENCOUNTER — HOSPITAL ENCOUNTER (OUTPATIENT)
Age: 69
Discharge: HOME | End: 2024-01-19
Attending: INTERNAL MEDICINE

## 2024-01-17 ENCOUNTER — HOSPITAL ENCOUNTER (EMERGENCY)
Age: 69
Discharge: HOME | End: 2024-01-17
Payer: MEDICARE

## 2024-01-17 VITALS
HEART RATE: 67 BPM | OXYGEN SATURATION: 99 % | RESPIRATION RATE: 16 BRPM | DIASTOLIC BLOOD PRESSURE: 72 MMHG | HEIGHT: 64 IN | WEIGHT: 110.01 LBS | BODY MASS INDEX: 18.78 KG/M2 | SYSTOLIC BLOOD PRESSURE: 163 MMHG | TEMPERATURE: 97.2 F

## 2024-01-17 VITALS
HEIGHT: 64 IN | BODY MASS INDEX: 18.78 KG/M2 | WEIGHT: 110.01 LBS | DIASTOLIC BLOOD PRESSURE: 72 MMHG | SYSTOLIC BLOOD PRESSURE: 163 MMHG

## 2024-01-17 DIAGNOSIS — I48.11 LONGSTANDING PERSISTENT ATRIAL FIBRILLATION (HCC): ICD-10-CM

## 2024-01-17 DIAGNOSIS — K64.4 EXTERNAL HEMORRHOIDS: Primary | ICD-10-CM

## 2024-01-17 DIAGNOSIS — Z79.01 CHRONIC ANTICOAGULATION: ICD-10-CM

## 2024-01-17 DIAGNOSIS — Z98.890 S/P ABLATION OF ATRIAL FIBRILLATION: ICD-10-CM

## 2024-01-17 DIAGNOSIS — I25.10 ASHD (ARTERIOSCLEROTIC HEART DISEASE): ICD-10-CM

## 2024-01-17 DIAGNOSIS — I49.5 TACHY-BRADY SYNDROME (HCC): ICD-10-CM

## 2024-01-17 DIAGNOSIS — Z95.2 S/P AORTIC VALVE AND MITRAL VALVE REPLACEMENT: ICD-10-CM

## 2024-01-17 DIAGNOSIS — I48.0 PAF (PAROXYSMAL ATRIAL FIBRILLATION) (HCC): ICD-10-CM

## 2024-01-17 DIAGNOSIS — Z95.1 S/P CABG X 2: ICD-10-CM

## 2024-01-17 DIAGNOSIS — Z86.79 S/P ABLATION OF ATRIAL FIBRILLATION: ICD-10-CM

## 2024-01-17 DIAGNOSIS — Z95.0 CARDIAC PACEMAKER IN SITU: ICD-10-CM

## 2024-01-17 DIAGNOSIS — E78.2 MIXED HYPERLIPIDEMIA: ICD-10-CM

## 2024-01-17 DIAGNOSIS — I10 ESSENTIAL HYPERTENSION: ICD-10-CM

## 2024-01-17 LAB
ECHO AO ROOT DIAM: 3 CM
ECHO AO ROOT INDEX: 1.97 CM/M2
ECHO AV ACCELERATION TIME: 108.66 MS
ECHO AV CUSP MM: 1 CM
ECHO AV MEAN GRADIENT: 6 MMHG
ECHO AV MEAN VELOCITY: 1.2 M/S
ECHO AV PEAK GRADIENT: 11 MMHG
ECHO AV PEAK VELOCITY: 1.7 M/S
ECHO AV VTI: 40.4 CM
ECHO BSA: 1.5 M2
ECHO EST RA PRESSURE: 3 MMHG
ECHO LA DIAMETER INDEX: 1.71 CM/M2
ECHO LA DIAMETER: 2.6 CM
ECHO LA TO AORTIC ROOT RATIO: 0.87
ECHO LA VOL MOD A2C: 45 ML (ref 22–52)
ECHO LA VOLUME INDEX MOD A2C: 30 ML/M2 (ref 16–34)
ECHO LV E' LATERAL VELOCITY: 10 CM/S
ECHO LV EDV A2C: 61 ML
ECHO LV EDV A4C: 52 ML
ECHO LV EDV BP: 57 ML (ref 56–104)
ECHO LV EDV INDEX A4C: 34 ML/M2
ECHO LV EDV INDEX BP: 38 ML/M2
ECHO LV EDV NDEX A2C: 40 ML/M2
ECHO LV EJECTION FRACTION BIPLANE: 48 % (ref 55–100)
ECHO LV ESV A2C: 31 ML
ECHO LV ESV A4C: 26 ML
ECHO LV ESV BP: 30 ML (ref 19–49)
ECHO LV ESV INDEX A2C: 20 ML/M2
ECHO LV ESV INDEX A4C: 17 ML/M2
ECHO LV ESV INDEX BP: 20 ML/M2
ECHO LV FRACTIONAL SHORTENING: 22 % (ref 28–44)
ECHO LV INTERNAL DIMENSION DIASTOLE INDEX: 2.7 CM/M2
ECHO LV INTERNAL DIMENSION DIASTOLIC: 4.1 CM (ref 3.9–5.3)
ECHO LV INTERNAL DIMENSION SYSTOLIC INDEX: 2.11 CM/M2
ECHO LV INTERNAL DIMENSION SYSTOLIC: 3.2 CM
ECHO LV IVSD: 0.9 CM (ref 0.6–0.9)
ECHO LV IVSS: 1.1 CM
ECHO LV MASS 2D: 105.6 G (ref 67–162)
ECHO LV MASS INDEX 2D: 69.5 G/M2 (ref 43–95)
ECHO LV POSTERIOR WALL DIASTOLIC: 0.8 CM (ref 0.6–0.9)
ECHO LV POSTERIOR WALL SYSTOLIC: 0.9 CM
ECHO LV RELATIVE WALL THICKNESS RATIO: 0.39
ECHO LVOT AV VTI INDEX: 0.55
ECHO LVOT MEAN GRADIENT: 2 MMHG
ECHO LVOT VTI: 22.1 CM
ECHO MV E DECELERATION TIME (DT): 205.8 MS
ECHO MV E VELOCITY: 1.78 M/S
ECHO MV E/E' LATERAL: 17.8
ECHO MV MEAN GRADIENT: 4 MMHG
ECHO MV PEAK GRADIENT: 18 MMHG
ECHO PV MAX VELOCITY: 0.7 M/S
ECHO RIGHT VENTRICULAR SYSTOLIC PRESSURE (RVSP): 40 MMHG
ECHO TV MEAN GRADIENT: 2 MMHG
ECHO TV REGURGITANT MAX VELOCITY: 3.03 M/S
ECHO TV REGURGITANT PEAK GRADIENT: 37 MMHG
INR PPP: 3.2
PROTHROMBIN TIME: 33 SEC (ref 11.9–14.8)

## 2024-01-17 PROCEDURE — 85610 PROTHROMBIN TIME: CPT

## 2024-01-17 PROCEDURE — 93306 TTE W/DOPPLER COMPLETE: CPT

## 2024-01-17 PROCEDURE — 36415 COLL VENOUS BLD VENIPUNCTURE: CPT

## 2024-01-17 PROCEDURE — 99283 EMERGENCY DEPT VISIT LOW MDM: CPT

## 2024-01-17 RX ORDER — HYDROCORTISONE 25 MG/G
CREAM TOPICAL
Qty: 28 G | Refills: 0 | Status: SHIPPED | OUTPATIENT
Start: 2024-01-17

## 2024-01-17 ASSESSMENT — ENCOUNTER SYMPTOMS
ANAL BLEEDING: 1
BLOOD IN STOOL: 1

## 2024-01-17 NOTE — DISCHARGE INSTRUCTIONS
INR today was 3.2    Continue home medications  Proctocream twice daily to external hemorrhoids for 3-7 days.  Follow up with Dr Velazquez for echo results.

## 2024-01-17 NOTE — ED PROVIDER NOTES
[facility] ED  EMERGENCY DEPARTMENT ENCOUNTER      Pt Name: Beth Vega  MRN: [id number]  Birthdate 1955  Date of evaluation: 1/17/2024  Provider: USAMA Mendoza CNP  2:39 PM    CHIEF COMPLAINT       Chief Complaint   Patient presents with    Hemorrhoids     Bleeding started around 1030 this am         HISTORY OF PRESENT ILLNESS        Beth Vega 69 yo female presents from home to ED with c/o hemorrhoids. Bleeding began at 1030 this am. No previous bleeding with hemorrhoids. On coumadin for atrial fibrillation. Due for INR. Was scheduled for ECHO today at 1pm.Unable to go for appointment due to continued bleeding.     The history is provided by the patient. No  was used.       Nursing Notes were reviewed.    REVIEW OF SYSTEMS       Review of Systems   Gastrointestinal:  Positive for anal bleeding and blood in stool.   All other systems reviewed and are negative.      Except as noted above the remainder of the review of systems was reviewed and negative.       PAST MEDICAL HISTORY     Past Medical History:   Diagnosis Date    Atrial fibrillation (HCC)     CAD (coronary artery disease)     CHF (congestive heart failure) (HCC)     Congestive heart failure (CHF) (HCC)     Depression 6/11/2015    H/O mitral valve replacement     H/O tricuspid valve repair     Hypertension     Ischemic colitis (HCC) 2005, 2010    Migraine     Migraine     Migraine     PAF (paroxysmal atrial fibrillation) (HCC)     Rheumatic fever     S/P AVR     SOB (shortness of breath)          SURGICAL HISTORY       Past Surgical History:   Procedure Laterality Date    AORTIC VALVE REPLACEMENT  2008    CARDIAC CATHETERIZATION  2007    CARDIAC VALVE REPLACEMENT  2008    aortic and mitral    CHOLECYSTECTOMY  1997    COLONOSCOPY  2010    MITRAL VALVE REPLACEMENT  2008    OVARIAN CYST REMOVAL Right 1977    PRE-MALIGNANT / BENIGN SKIN LESION EXCISION Left 5/2/13    face    TONSILLECTOMY AND ADENOIDECTOMY

## 2024-01-19 ENCOUNTER — TELEPHONE (OUTPATIENT)
Dept: CARDIOLOGY | Age: 69
End: 2024-01-19

## 2024-01-19 NOTE — TELEPHONE ENCOUNTER
----- Message from Priscilla Velazquez MD sent at 1/18/2024  9:28 PM EST -----  Echo didn't change from before.

## 2024-02-08 ENCOUNTER — HOSPITAL ENCOUNTER (OUTPATIENT)
Dept: PHARMACY | Age: 69
Setting detail: THERAPIES SERIES
Discharge: HOME | End: 2024-02-08
Payer: MEDICARE

## 2024-02-08 ENCOUNTER — HOSPITAL ENCOUNTER (OUTPATIENT)
Age: 69
Discharge: HOME | End: 2024-02-08
Payer: MEDICARE

## 2024-02-08 ENCOUNTER — OFFICE VISIT (OUTPATIENT)
Dept: GASTROENTEROLOGY | Age: 69
End: 2024-02-08
Payer: MEDICARE

## 2024-02-08 VITALS
WEIGHT: 108.9 LBS | SYSTOLIC BLOOD PRESSURE: 160 MMHG | BODY MASS INDEX: 18.59 KG/M2 | DIASTOLIC BLOOD PRESSURE: 78 MMHG | RESPIRATION RATE: 18 BRPM | HEART RATE: 83 BPM | OXYGEN SATURATION: 98 % | HEIGHT: 64 IN

## 2024-02-08 VITALS
HEART RATE: 61 BPM | BODY MASS INDEX: 18.53 KG/M2 | DIASTOLIC BLOOD PRESSURE: 82 MMHG | SYSTOLIC BLOOD PRESSURE: 168 MMHG | WEIGHT: 108 LBS

## 2024-02-08 DIAGNOSIS — Z95.2 S/P MVR (MITRAL VALVE REPLACEMENT): Primary | ICD-10-CM

## 2024-02-08 DIAGNOSIS — R74.8 ELEVATED LIVER ENZYMES: ICD-10-CM

## 2024-02-08 DIAGNOSIS — R74.8 ELEVATED LIVER ENZYMES: Primary | ICD-10-CM

## 2024-02-08 DIAGNOSIS — Z11.59 ENCOUNTER FOR SCREENING FOR OTHER VIRAL DISEASES: ICD-10-CM

## 2024-02-08 DIAGNOSIS — Z72.89 OTHER PROBLEMS RELATED TO LIFESTYLE: ICD-10-CM

## 2024-02-08 DIAGNOSIS — D69.6 LOW PLATELET COUNT (HCC): ICD-10-CM

## 2024-02-08 DIAGNOSIS — Z95.2 AORTIC VALVE REPLACED: ICD-10-CM

## 2024-02-08 LAB
CERULOPLASMIN SERPL-MCNC: 28 MG/DL (ref 16–45)
INR BLD: 4.5

## 2024-02-08 PROCEDURE — 86038 ANTINUCLEAR ANTIBODIES: CPT

## 2024-02-08 PROCEDURE — 86317 IMMUNOASSAY INFECTIOUS AGENT: CPT

## 2024-02-08 PROCEDURE — 86709 HEPATITIS A IGM ANTIBODY: CPT

## 2024-02-08 PROCEDURE — 86803 HEPATITIS C AB TEST: CPT

## 2024-02-08 PROCEDURE — 1123F ACP DISCUSS/DSCN MKR DOCD: CPT | Performed by: NURSE PRACTITIONER

## 2024-02-08 PROCEDURE — 86705 HEP B CORE ANTIBODY IGM: CPT

## 2024-02-08 PROCEDURE — 82390 ASSAY OF CERULOPLASMIN: CPT

## 2024-02-08 PROCEDURE — 82103 ALPHA-1-ANTITRYPSIN TOTAL: CPT

## 2024-02-08 PROCEDURE — 83516 IMMUNOASSAY NONANTIBODY: CPT

## 2024-02-08 PROCEDURE — 85610 PROTHROMBIN TIME: CPT

## 2024-02-08 PROCEDURE — 86704 HEP B CORE ANTIBODY TOTAL: CPT

## 2024-02-08 PROCEDURE — 86708 HEPATITIS A ANTIBODY: CPT

## 2024-02-08 PROCEDURE — 82104 ALPHA-1-ANTITRYPSIN PHENO: CPT

## 2024-02-08 PROCEDURE — 36415 COLL VENOUS BLD VENIPUNCTURE: CPT

## 2024-02-08 PROCEDURE — 81256 HFE GENE: CPT

## 2024-02-08 PROCEDURE — 99203 OFFICE O/P NEW LOW 30 MIN: CPT | Performed by: NURSE PRACTITIONER

## 2024-02-08 PROCEDURE — 82784 ASSAY IGA/IGD/IGG/IGM EACH: CPT

## 2024-02-08 PROCEDURE — 86225 DNA ANTIBODY NATIVE: CPT

## 2024-02-08 PROCEDURE — 87340 HEPATITIS B SURFACE AG IA: CPT

## 2024-02-08 PROCEDURE — 99212 OFFICE O/P EST SF 10 MIN: CPT

## 2024-02-08 ASSESSMENT — ENCOUNTER SYMPTOMS
ABDOMINAL DISTENTION: 0
NAUSEA: 0
VOMITING: 0
COLOR CHANGE: 0
GASTROINTESTINAL NEGATIVE: 1
RESPIRATORY NEGATIVE: 1
COUGH: 0
ABDOMINAL PAIN: 0
ALLERGIC/IMMUNOLOGIC NEGATIVE: 1

## 2024-02-08 NOTE — PROGRESS NOTES
[facility]  Medication Management  ANTICOAGULATION    Referring Provider: Dr. Velazquez     GOAL INR: 2.5 - 3.5     TODAY'S INR: 4.5     WARFARIN Dosage: Hold warfarin today then decrease warfarin to 5 mg M, Th and 2.5 mg all other days.    INR (no units)   Date Value   2024 3.2   2023 3.2   2023 2.6   2023 3   08/15/2023 2.8   2023 3.1   2023 3.3       Hemoglobin   Date Value Ref Range Status   2023 13.8 11.9 - 15.1 g/dL Final     Hematocrit   Date Value Ref Range Status   2023 41.8 36.3 - 47.1 % Final     ALT   Date Value Ref Range Status   2023 36 (H) 5 - 33 U/L Final     AST   Date Value Ref Range Status   2023 41 (H) <32 U/L Final       Medication changes:  none    Notes:    Fingerstick INR drawn per clinic protocol. Patient states no visible blood in urine and no black tarry stool. Denies any missed doses of warfarin. No change in other maintenance medications or in diet. Will recheck INR in 2 weeks. Patient acknowledges working in consult agreement with pharmacist as referred by his/her physician.       Increased liver enzymes and gallbladder duct enlarged (gallbladder removed in )    Increased liver enzymes may cause increase INR.   Patient has an appt with Zoë Montalvo in GI today regarding abdominal pain, elevated liver enzymes and enlarged gallbladder duct.    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  Patient states she does not need to hold warfarin prior to this procedure.  She will call office to verify.    For Pharmacy Admin Tracking Only    Intervention Detail: Adherence Monitorin and Dose Adjustment: 2, reason: Therapy Optimization  Total # of Interventions Recommended: 2  Total # of Interventions Accepted: 2  Time Spent (min):  40      Ofelia Sterling [facility], PharmD

## 2024-02-08 NOTE — PATIENT INSTRUCTIONS
SURVEY:    You may be receiving a survey from [facility] your [facility] today.    You may get this in the mail, through your MyChart, or in your email.     Please complete the survey to enable us to provide the highest quality of care to you and your family.    If you cannot score us a very good (5 Stars) on any question, please call the office to discuss how we could of made your experience exceptional.    Thank you!    MD Zoë Valera, APRN-ZAK Alfaro LPN Michelle, LPN    Phone: [phone]  Fax: [phone]    Office Hours:   M-TH 8-5, F: 8-12

## 2024-02-08 NOTE — PROGRESS NOTES
Elevated liver enzymes, ischemic colitis     Endocrine: Negative.    Musculoskeletal: Negative.    Skin: Negative.  Negative for color change.   Allergic/Immunologic: Negative.    Neurological: Negative.    Hematological: Negative.    Psychiatric/Behavioral: Negative.         Physical Exam  VS:  BP (!) 160/78   Pulse 83   Resp 18   Ht 1.626 m (5' 4.02\")   Wt 49.4 kg (108 lb 14.4 oz)   SpO2 98%   BMI 18.68 kg/m²  Body mass index is 18.68 kg/m².    General Appearance/Constitutional:  Alert, NAD.Orientation age-appropriate.   HEENT:  NCAT.   Neck: supple  Pulmonary/Chest: clear to auscultation bilaterally.  Cardiovascular: normal rate, regular rhythm.  Lymphatics: No cervical lymphadenopathy noted.  Abdomen: soft, non-tender, non-distended, normal bowel sounds.  No obvious ascites.  Skin:  Normal turgor.  Warm, dry, without visible rash, unless noted elsewhere.  No jaundice.  Extremities: no lower extremity edema.  Musculoskeletal: normal range of motion, no joint swelling.  Neurologic:  no cranial nerve deficit, gait, coordination and speech normal.      Lab Results   Component Value Date    WBC 6.3 11/09/2023    HGB 13.8 11/09/2023    HCT 41.8 11/09/2023    MCV 99.1 11/09/2023    PLT See Reflexed IPF Result 11/09/2023        Lab Results   Component Value Date     11/09/2023    K 4.2 11/09/2023     11/09/2023    CO2 26 11/09/2023    BUN 22 11/09/2023    CREATININE 0.8 11/09/2023    GLUCOSE 97 11/09/2023    CALCIUM 10.0 11/09/2023    PROT 7.5 11/09/2023    LABALBU 5.0 11/09/2023    BILITOT 0.6 11/09/2023    ALKPHOS 76 11/09/2023    AST 41 (H) 11/09/2023    ALT 36 (H) 11/09/2023    LABGLOM >60 11/09/2023    GFRAA >60 04/01/2022    AGRATIO 2.0 11/09/2023          Lab Results   Component Value Date    INR 4.5 02/08/2024    INR 3.2 01/17/2024    INR 3.2 12/19/2023    PROTIME 33.0 (H) 01/17/2024    PROTIME 26.0 (H) 06/01/2020    PROTIME 34.1 (H) 04/27/2020       CT Result (most recent):  CT ABDOMEN PELVIS

## 2024-02-09 LAB
GLIADIN IGA SER IA-ACNC: NORMAL U/ML
GLIADIN IGG SER IA-ACNC: NORMAL U/ML
HAV AB SERPL IA-ACNC: NONREACTIVE
HAV IGM SERPL QL IA: NONREACTIVE
HBV CORE AB SER QL: NONREACTIVE
HBV CORE IGM SERPL QL IA: NONREACTIVE
HBV SURFACE AB SERPL IA-ACNC: 186.5 MIU/ML
HBV SURFACE AG SERPL QL IA: NONREACTIVE
HCV AB SERPL QL IA: NONREACTIVE
IGA SERPL-MCNC: 117 MG/DL (ref 70–400)
TTG IGA SER IA-ACNC: NORMAL U/ML

## 2024-02-12 LAB
ANA SER QL IA: ABNORMAL
DSDNA IGG SER QL IA: 12 IU/ML
GLIADIN IGA SER IA-ACNC: 1.9 U/ML
GLIADIN IGG SER IA-ACNC: <0.4 U/ML
IGA SERPL-MCNC: 117 MG/DL (ref 70–400)
MITOCHONDRIA M2 IGG SER-ACNC: <0.5 U/ML (ref 0–4)
NUCLEAR IGG SER IA-RTO: 0.2 U/ML
TTG IGA SER IA-ACNC: 0.3 U/ML

## 2024-02-13 LAB
ALPHA-1 ANTITRYPSIN PHENOTYPE: NORMAL
ALPHA-1 ANTITRYPSIN: 167 MG/DL (ref 90–200)

## 2024-02-14 LAB
C282Y HEMOCHROMATOSIS MUT: NEGATIVE
H63D HEMOCHROMATOSIS MUT: NEGATIVE
HEMOCHROMATOSIS MUTATION INT: NORMAL
HEMOCHROMATOSIS SPECIMEN: NORMAL
S65C HEMOCHROMATOSIS MUT: NEGATIVE

## 2024-02-15 ENCOUNTER — TELEPHONE (OUTPATIENT)
Dept: GASTROENTEROLOGY | Age: 69
End: 2024-02-15

## 2024-02-15 NOTE — TELEPHONE ENCOUNTER
Patient is unable to have the MRI MRCP due to pacemaker and artificial valve replacement. She will discuss other options at her follow up on the 7th.

## 2024-02-19 ENCOUNTER — TELEPHONE (OUTPATIENT)
Dept: SURGERY | Age: 69
End: 2024-02-19

## 2024-02-19 DIAGNOSIS — R74.8 ELEVATED LIVER ENZYMES: Primary | ICD-10-CM

## 2024-02-19 NOTE — TELEPHONE ENCOUNTER
----- Message from USAMA Zheng CNP sent at 2/19/2024  3:24 PM EST -----  Equivocal LUIS DANIEL results, would recommend retesting at 10 weeks.  Orders placed on her EMR. Thanks, Zoë.

## 2024-02-23 ENCOUNTER — HOSPITAL ENCOUNTER (OUTPATIENT)
Age: 69
End: 2024-02-23
Payer: MEDICARE

## 2024-02-23 ENCOUNTER — HOSPITAL ENCOUNTER (OUTPATIENT)
Dept: PHARMACY | Age: 69
Setting detail: THERAPIES SERIES
Discharge: HOME | End: 2024-02-23
Payer: MEDICARE

## 2024-02-23 ENCOUNTER — HOSPITAL ENCOUNTER (OUTPATIENT)
Dept: GENERAL RADIOLOGY | Age: 69
End: 2024-02-23
Payer: MEDICARE

## 2024-02-23 VITALS
DIASTOLIC BLOOD PRESSURE: 79 MMHG | BODY MASS INDEX: 18.6 KG/M2 | SYSTOLIC BLOOD PRESSURE: 153 MMHG | WEIGHT: 108.4 LBS | HEART RATE: 68 BPM

## 2024-02-23 DIAGNOSIS — Z95.2 AORTIC VALVE REPLACED: ICD-10-CM

## 2024-02-23 DIAGNOSIS — Z95.2 S/P MVR (MITRAL VALVE REPLACEMENT): Primary | ICD-10-CM

## 2024-02-23 DIAGNOSIS — T17.808A FOREIGN BODY IN LUNG, INITIAL ENCOUNTER: ICD-10-CM

## 2024-02-23 DIAGNOSIS — I48.91 ATRIAL FIBRILLATION, UNSPECIFIED TYPE (HCC): ICD-10-CM

## 2024-02-23 LAB — INR BLD: 3.4

## 2024-02-23 PROCEDURE — 99211 OFF/OP EST MAY X REQ PHY/QHP: CPT | Performed by: FAMILY MEDICINE

## 2024-02-23 PROCEDURE — 71046 X-RAY EXAM CHEST 2 VIEWS: CPT

## 2024-02-23 PROCEDURE — 85610 PROTHROMBIN TIME: CPT | Performed by: FAMILY MEDICINE

## 2024-02-23 NOTE — PROGRESS NOTES
[facility]  Medication Management  ANTICOAGULATION    Referring Provider: Dr Velazquez    GOAL INR: 2.5-3.5    TODAY'S INR: 3.4    WARFARIN Dosage: continue 5mg MR, 2.5mg all other day    INR (no units)   Date Value   2024 3.4   2024 4.5   2024 3.2   2023 3.2   2023 2.6   2023 3   08/15/2023 2.8       Hemoglobin   Date Value Ref Range Status   2023 13.8 11.9 - 15.1 g/dL Final     Hematocrit   Date Value Ref Range Status   2023 41.8 36.3 - 47.1 % Final     ALT   Date Value Ref Range Status   2023 36 (H) 5 - 33 U/L Final     AST   Date Value Ref Range Status   2023 41 (H) <32 U/L Final       Medication changes:  No change    Notes:    Fingerstick INR drawn per clinic protocol. Patient states no visible blood in urine and no black tarry stool. Denies any missed doses of warfarin. No change in other maintenance medications or in diet. Will recheck INR in 3 weeks. Patient is having squamous cell carcinoma removed from left cheek 24 by Dr Goodwin in [place]. Patient states she does not require warfarin hold for MOHS procedure.  Patient had  chest xray prior to this appt to access if she is candidate for MRI (has \"wires in my chest\").  Patient was seen by KAI Cook CNP on 24 and is to have follow up labs, US abdomen, MRI if permissible.  Patient acknowledges working in consult agreement with pharmacist as referred by his/her physician.                  For Pharmacy Admin Tracking Only    Intervention Detail: Adherence Monitorin  Total # of Interventions Recommended: 2  Total # of Interventions Accepted: 2  Time Spent (min): 30      MAMIE Hernandez.Ph., 2024,2:52 PM

## 2024-02-23 NOTE — PATIENT INSTRUCTIONS
Continue to take warfarin 5mg (1 tablet) Monday ad Thursday and 2.5mg (1/2 tablet) all other days.  Continue to monitor urine and stool for signs and symptoms of bleeding.   Please notify the clinic of any medication changes.   Please remember to bring all medications (both prescription and OTC) to your next visit.   Kindly notify the clinic if you are unable to make to your next appointment.   Follow warfarin dosing instructions on dosing calendar provided.

## 2024-02-26 DIAGNOSIS — D69.6 LOW PLATELET COUNT (HCC): ICD-10-CM

## 2024-02-26 DIAGNOSIS — R74.8 ELEVATED LIVER ENZYMES: ICD-10-CM

## 2024-03-12 ENCOUNTER — NURSE ONLY (OUTPATIENT)
Dept: CARDIOLOGY | Age: 69
End: 2024-03-12
Payer: MEDICARE

## 2024-03-12 DIAGNOSIS — I49.5 TACHY-BRADY SYNDROME (HCC): Primary | ICD-10-CM

## 2024-03-12 DIAGNOSIS — Z95.0 CARDIAC PACEMAKER IN SITU: ICD-10-CM

## 2024-03-12 PROCEDURE — 93294 REM INTERROG EVL PM/LDLS PM: CPT | Performed by: INTERNAL MEDICINE

## 2024-03-13 RX ORDER — LOSARTAN POTASSIUM 50 MG/1
50 TABLET ORAL 2 TIMES DAILY
Qty: 180 TABLET | Refills: 0 | Status: SHIPPED | OUTPATIENT
Start: 2024-03-13

## 2024-03-19 ENCOUNTER — HOSPITAL ENCOUNTER (OUTPATIENT)
Dept: PHARMACY | Age: 69
Setting detail: THERAPIES SERIES
Discharge: HOME | End: 2024-03-19
Payer: MEDICARE

## 2024-03-19 VITALS
WEIGHT: 110.6 LBS | SYSTOLIC BLOOD PRESSURE: 162 MMHG | DIASTOLIC BLOOD PRESSURE: 81 MMHG | BODY MASS INDEX: 18.97 KG/M2 | HEART RATE: 66 BPM

## 2024-03-19 DIAGNOSIS — I48.91 ATRIAL FIBRILLATION, UNSPECIFIED TYPE (HCC): ICD-10-CM

## 2024-03-19 DIAGNOSIS — Z95.2 S/P MVR (MITRAL VALVE REPLACEMENT): Primary | ICD-10-CM

## 2024-03-19 DIAGNOSIS — Z95.2 AORTIC VALVE REPLACED: ICD-10-CM

## 2024-03-19 LAB — INR BLD: 2.4

## 2024-03-19 PROCEDURE — 99211 OFF/OP EST MAY X REQ PHY/QHP: CPT | Performed by: FAMILY MEDICINE

## 2024-03-19 PROCEDURE — 85610 PROTHROMBIN TIME: CPT | Performed by: FAMILY MEDICINE

## 2024-03-19 NOTE — PROGRESS NOTES
[facility]  Medication Management  ANTICOAGULATION    Referring Provider: Dr Velazquez    GOAL INR: 2.5-3.5    TODAY'S INR: 2.4    WARFARIN Dosage: 5mg x1 then resume 5mg MR, 2.5mg all other days    INR (no units)   Date Value   2024 2.4   2024 3.4   2024 4.5   2024 3.2   2023 3.2   2023 2.6   2023 3       Hemoglobin   Date Value Ref Range Status   2023 13.8 11.9 - 15.1 g/dL Final     Hematocrit   Date Value Ref Range Status   2023 41.8 36.3 - 47.1 % Final     ALT   Date Value Ref Range Status   2023 36 (H) 5 - 33 U/L Final     AST   Date Value Ref Range Status   2023 41 (H) <32 U/L Final       Medication changes:  No change    Notes:    Fingerstick INR drawn per clinic protocol. Patient states no visible blood in urine and no black tarry stool. Denies any missed doses of warfarin. No change in other maintenance medications or in diet. Will recheck INR in 6 weeks.  Patient had a meal with significant amount of chickpeas on . This is likely contributing to her slightly subtherapeutic INR today. Patient will take warfarin 5mg today then resume previously stable dosing. Patient acknowledges working in consult agreement with pharmacist as referred by his/her physician.                  For Pharmacy Admin Tracking Only    Intervention Detail: Adherence Monitorin and Dose Adjustment: 1, reason: Therapy Optimization  Total # of Interventions Recommended: 3  Total # of Interventions Accepted: 3  Time Spent (min): 20      MAMIE Hernandez.Ph., 3/19/2024,3:04 PM

## 2024-03-19 NOTE — PATIENT INSTRUCTIONS
Please take 5mg (1 tablet) warfarin today then resume 5mg Monday and Thursday and 2.5mg (1/2 tablet) all other days.  Continue to monitor urine and stool for signs and symptoms of bleeding.   Please notify the clinic of any medication changes.   Please remember to bring all medications (both prescription and OTC) to your next visit.   Kindly notify the clinic if you are unable to make to your next appointment.   Follow warfarin dosing instructions on dosing calendar provided.

## 2024-03-25 ENCOUNTER — TELEPHONE (OUTPATIENT)
Dept: SURGERY | Age: 69
End: 2024-03-25

## 2024-03-25 NOTE — TELEPHONE ENCOUNTER
----- Message from USAMA Zheng CNP sent at 3/25/2024  2:56 PM EDT -----  Please send results to ordering provider.  Thanks, Zoë.

## 2024-04-30 ENCOUNTER — HOSPITAL ENCOUNTER (OUTPATIENT)
Dept: PHARMACY | Age: 69
Setting detail: THERAPIES SERIES
Discharge: HOME | End: 2024-04-30
Payer: MEDICARE

## 2024-04-30 VITALS
DIASTOLIC BLOOD PRESSURE: 58 MMHG | SYSTOLIC BLOOD PRESSURE: 126 MMHG | BODY MASS INDEX: 18.7 KG/M2 | HEART RATE: 87 BPM | WEIGHT: 109 LBS

## 2024-04-30 LAB — INR BLD: 2.6

## 2024-04-30 PROCEDURE — 85610 PROTHROMBIN TIME: CPT

## 2024-04-30 PROCEDURE — 99211 OFF/OP EST MAY X REQ PHY/QHP: CPT

## 2024-04-30 NOTE — PROGRESS NOTES
[facility]  Medication Management  ANTICOAGULATION    Referring Provider: Dr Velazquez    GOAL INR: 2.5-3.5    TODAY'S INR: 2.6    WARFARIN Dosage: 5 mg po every Monday and Thursday;  2.5 mg po all other days    INR (no units)   Date Value   2024 2.6   2024 2.4   2024 3.4   2024 4.5   2024 3.2   2023 3.2   2023 2.6       Hemoglobin   Date Value Ref Range Status   2023 13.8 11.9 - 15.1 g/dL Final     Hematocrit   Date Value Ref Range Status   2023 41.8 36.3 - 47.1 % Final     ALT   Date Value Ref Range Status   2023 36 (H) 5 - 33 U/L Final     AST   Date Value Ref Range Status   2023 41 (H) <32 U/L Final       Medication changes:  No change    Notes:    Fingerstick INR drawn per clinic protocol. Patient states no visible blood in urine and no black tarry stool. Denies any missed doses of warfarin. No change in other maintenance medications or in diet. Will recheck INR in 6 weeks. Patient acknowledges working in consult agreement with pharmacist as referred by his/her physician.                  For Pharmacy Admin Tracking Only    Intervention Detail: Adherence Monitorin  Total # of Interventions Recommended: 1  Total # of Interventions Accepted: 1  Time Spent (min): 20     Annika Hernandez RPh

## 2024-05-07 ENCOUNTER — HOSPITAL ENCOUNTER (OUTPATIENT)
Age: 69
Discharge: HOME | End: 2024-05-07
Payer: MEDICARE

## 2024-05-07 ENCOUNTER — OFFICE VISIT (OUTPATIENT)
Dept: GASTROENTEROLOGY | Age: 69
End: 2024-05-07

## 2024-05-07 VITALS
BODY MASS INDEX: 18.73 KG/M2 | WEIGHT: 109.2 LBS | OXYGEN SATURATION: 98 % | DIASTOLIC BLOOD PRESSURE: 78 MMHG | RESPIRATION RATE: 18 BRPM | HEART RATE: 78 BPM | SYSTOLIC BLOOD PRESSURE: 120 MMHG

## 2024-05-07 DIAGNOSIS — Z87.898 HISTORY OF ELEVATED ANTINUCLEAR ANTIBODY (ANA): Primary | ICD-10-CM

## 2024-05-07 DIAGNOSIS — R74.8 ELEVATED LIVER ENZYMES: ICD-10-CM

## 2024-05-07 DIAGNOSIS — Z87.898 HISTORY OF ELEVATED ANTINUCLEAR ANTIBODY (ANA): ICD-10-CM

## 2024-05-07 DIAGNOSIS — Z86.79 HISTORY OF ATRIAL FIBRILLATION: ICD-10-CM

## 2024-05-07 DIAGNOSIS — Z79.01 CHRONIC ANTICOAGULATION: ICD-10-CM

## 2024-05-07 LAB
ALBUMIN SERPL-MCNC: 4.3 G/DL (ref 3.5–5.2)
ALBUMIN/GLOB SERPL: 1.7 {RATIO} (ref 1–2.5)
ALP SERPL-CCNC: 80 U/L (ref 35–104)
ALT SERPL-CCNC: 31 U/L (ref 5–33)
AST SERPL-CCNC: 34 U/L
BASOPHILS # BLD: 0.05 K/UL (ref 0–0.2)
BASOPHILS NFR BLD: 1 % (ref 0–2)
BILIRUB DIRECT SERPL-MCNC: <0.1 MG/DL
BILIRUB INDIRECT SERPL-MCNC: ABNORMAL MG/DL (ref 0–1)
BILIRUB SERPL-MCNC: 0.6 MG/DL (ref 0.3–1.2)
EOSINOPHIL # BLD: 0.17 K/UL (ref 0–0.44)
EOSINOPHILS RELATIVE PERCENT: 4 % (ref 1–4)
ERYTHROCYTE [DISTWIDTH] IN BLOOD BY AUTOMATED COUNT: 12.3 % (ref 11.8–14.4)
HCT VFR BLD AUTO: 40.4 % (ref 36.3–47.1)
HGB BLD-MCNC: 13 G/DL (ref 11.9–15.1)
IMM GRANULOCYTES # BLD AUTO: <0.03 K/UL (ref 0–0.3)
IMM GRANULOCYTES NFR BLD: 0 %
LYMPHOCYTES NFR BLD: 1.06 K/UL (ref 1.1–3.7)
LYMPHOCYTES RELATIVE PERCENT: 22 % (ref 24–43)
MCH RBC QN AUTO: 31.7 PG (ref 25.2–33.5)
MCHC RBC AUTO-ENTMCNC: 32.2 G/DL (ref 28.4–34.8)
MCV RBC AUTO: 98.5 FL (ref 82.6–102.9)
MONOCYTES NFR BLD: 0.4 K/UL (ref 0.1–1.2)
MONOCYTES NFR BLD: 8 % (ref 3–12)
NEUTROPHILS NFR BLD: 65 % (ref 36–65)
NEUTS SEG NFR BLD: 3.23 K/UL (ref 1.5–8.1)
NRBC BLD-RTO: 0 PER 100 WBC
PLATELET # BLD AUTO: 149 K/UL (ref 138–453)
PMV BLD AUTO: 10.2 FL (ref 8.1–13.5)
PROT SERPL-MCNC: 6.9 G/DL (ref 6.4–8.3)
RBC # BLD AUTO: 4.1 M/UL (ref 3.95–5.11)
WBC OTHER # BLD: 4.9 K/UL (ref 3.5–11.3)

## 2024-05-07 PROCEDURE — 82977 ASSAY OF GGT: CPT

## 2024-05-07 PROCEDURE — 86038 ANTINUCLEAR ANTIBODIES: CPT

## 2024-05-07 PROCEDURE — 85025 COMPLETE CBC W/AUTO DIFF WBC: CPT

## 2024-05-07 PROCEDURE — 36415 COLL VENOUS BLD VENIPUNCTURE: CPT

## 2024-05-07 PROCEDURE — 86225 DNA ANTIBODY NATIVE: CPT

## 2024-05-07 PROCEDURE — 80076 HEPATIC FUNCTION PANEL: CPT

## 2024-05-07 NOTE — PROGRESS NOTES
[address] [address]    Chief Complaint   Patient presents with    Follow-up     Pt present for 4 wk follow up for abnormal CT scan and elevated liver enzymes. Pt states some abdominal pain. Pt states normal BM with no issues or concerns.        HPI Beth Vega is a 68 y.o. old female who has a past medical history of hypertension, A fib, coronary artery disease, CHF, mitral valve replacement, tricuspid valve repair s/p rheumatic fever, ischemic colitis, initially presented as a new GI referral with concern for abnormal CT scan and elevated liver enzymes.    According to Beth, she had a CT scan of her abdomen and pelvis after experiencing what she describes as \"just a twinge\" sensation that she noted in her right upper abdomen.  She is status post cholecystectomy (1997).  Further evaluation noted an elevation in her AST and ALT, alkaline phosphate within normal limits.  Platelets noted to be low currently at 137, reviewed EMR notes that in 2021 platelets were 125.  Beth describe herself as a nonalcohol drinker with her last alcoholic drink being in early 80s.  Her mother has a history of liver cancer that brother reports was metastasized from breast cancer.    Beth denies any jaundice, darkening of her urine.  No abdominal bloating.d Hx of colon polyps.   No Rectal bleeding or melena,  Denies unexplained weight loss, fever, or other systemic symptoms. No First-degree relative with  colorectal cancer. Not on anticoagulants or antiaggregant therapy.      Interval history 5/12/24:  GI follow up.  Discussed labs; will repeat LUIS DANIEL due to equivocal results.  She was unable to have a CT or MRCP completed due to pacemaker.  Today, she reports that she feels well, doing well with no concerns.        HERED. HEMOCHROMATOSIS, DNA      Component 2/8/24 6594   Hemochromatosis Specimen Whole Blood VC   Comment: CORRECTED ON 02/14 AT 1229: PREVIOUSLY REPORTED AS .BLOOD   H63D Hemochrom

## 2024-05-07 NOTE — PATIENT INSTRUCTIONS
SURVEY:    You may be receiving a survey from [facility] regarding your visit today.    You may get this in the mail, through your MyChart, or in your email.     Please complete the survey to enable us to provide the highest quality of care to you and your family.    If you cannot score us a very good (5 Stars) on any question, please call the office to discuss how we could of made your experience exceptional.    Thank you!    MD Zoë Valera, APRN-CHIDI Joshi, PING Hale LPN Brenda Boehler, LPN Jena Adams, MA    Phone: [phone]  Fax: [phone]    Office Hours:   M-TH 8-5, F: 8-12

## 2024-05-08 LAB — GGT SERPL-CCNC: 17 U/L (ref 5–36)

## 2024-05-08 RX ORDER — WARFARIN SODIUM 5 MG/1
TABLET ORAL
Qty: 90 TABLET | Refills: 3 | Status: SHIPPED | OUTPATIENT
Start: 2024-05-08

## 2024-05-10 ENCOUNTER — TELEPHONE (OUTPATIENT)
Dept: GASTROENTEROLOGY | Age: 69
End: 2024-05-10

## 2024-05-10 NOTE — TELEPHONE ENCOUNTER
----- Message from USAMA Zheng - CNP sent at 5/9/2024  7:24 AM EDT -----  Please notify Beth that her liver enzymes have improved slightly.  Platelet count has improved and she has no anemia.  Thanks, Zoë

## 2024-05-11 LAB
ANA SER QL IA: POSITIVE
DSDNA IGG SER QL IA: 17 IU/ML
NUCLEAR IGG SER IA-RTO: 0.2 U/ML

## 2024-05-15 ENCOUNTER — TELEPHONE (OUTPATIENT)
Dept: GASTROENTEROLOGY | Age: 69
End: 2024-05-15

## 2024-05-15 DIAGNOSIS — R74.8 ELEVATED LIVER ENZYMES: Primary | ICD-10-CM

## 2024-05-16 NOTE — TELEPHONE ENCOUNTER
Please notify Beth that I discussed labs with Dr. Warner and she recommened further labs as her LUIS DANIEL returned positive. I have placed orders for anti-smooth muscle and liver-kidney microsome on her chart.  Thanks, Zoë  
Spoke with patient. Provider message given, patient voiced understanding of new orders placed. Patient reports she will complete on Tuesday 5/21/24.  
Detail Level: Detailed

## 2024-05-21 ENCOUNTER — HOSPITAL ENCOUNTER (OUTPATIENT)
Age: 69
Discharge: HOME | End: 2024-05-21
Payer: MEDICARE

## 2024-05-21 DIAGNOSIS — R74.8 ELEVATED LIVER ENZYMES: ICD-10-CM

## 2024-05-21 PROCEDURE — 86376 MICROSOMAL ANTIBODY EACH: CPT

## 2024-05-21 PROCEDURE — 36415 COLL VENOUS BLD VENIPUNCTURE: CPT

## 2024-05-21 PROCEDURE — 83516 IMMUNOASSAY NONANTIBODY: CPT

## 2024-05-23 LAB
LKM-1 IGG SER IA-ACNC: 0.7 U (ref 0–24.9)
SMOOTH MUSCLE ANTIBODY: 2 UNITS (ref 0–19)

## 2024-06-04 ENCOUNTER — OFFICE VISIT (OUTPATIENT)
Dept: GASTROENTEROLOGY | Age: 69
End: 2024-06-04
Payer: MEDICARE

## 2024-06-04 VITALS
DIASTOLIC BLOOD PRESSURE: 70 MMHG | WEIGHT: 106 LBS | OXYGEN SATURATION: 94 % | BODY MASS INDEX: 18.19 KG/M2 | SYSTOLIC BLOOD PRESSURE: 110 MMHG | RESPIRATION RATE: 18 BRPM | HEART RATE: 63 BPM

## 2024-06-04 DIAGNOSIS — R74.8 ELEVATED LIVER ENZYMES: Primary | ICD-10-CM

## 2024-06-04 PROCEDURE — 99214 OFFICE O/P EST MOD 30 MIN: CPT | Performed by: INTERNAL MEDICINE

## 2024-06-04 PROCEDURE — 1123F ACP DISCUSS/DSCN MKR DOCD: CPT | Performed by: INTERNAL MEDICINE

## 2024-06-04 NOTE — PROGRESS NOTES
[facility] GASTROENTEROLOGY  [address] [place] [address]  Dept: [phone]    Chief Complaint   Patient presents with    Follow-up     Patient present for follow up. Patients liver enzymes have improved slightly.  Platelet count has improved and she has no anemia. Pt states no abd pain. Pt states BM are normal. Pt takes mirLAX once a day. Pt states she does feel tired most days. Patient would like to discuss recent labs from 5/21/24.        HPI     Beth Vega is a 68 y.o. female with a past medical history of atrial fibrillation, coronary artery disease, congestive heart failure who presents with elevated AST level of 34 - all other liver function tests were normal. Viral hepatitis testing was negative. She states that she feels other wise well and has no concerns at this time. Her last CT imaging was in 11/2023 with no report of a liver finding. US of liver with elastography is pending.     Past Medical History:   Diagnosis Date    Atrial fibrillation (HCC)     CAD (coronary artery disease)     CHF (congestive heart failure) (HCC)     Congestive heart failure (CHF) (HCC)     Depression 6/11/2015    H/O mitral valve replacement     H/O tricuspid valve repair     Hypertension     Ischemic colitis (HCC) 2005, 2010    Migraine     Migraine     Migraine     PAF (paroxysmal atrial fibrillation) (HCC)     Rheumatic fever     S/P AVR     SOB (shortness of breath)         Past Surgical History:   Procedure Laterality Date    AORTIC VALVE REPLACEMENT  2008    CARDIAC CATHETERIZATION  2007    CARDIAC VALVE REPLACEMENT  2008    aortic and mitral    CHOLECYSTECTOMY  1997    COLONOSCOPY  2010    MITRAL VALVE REPLACEMENT  2008    OVARIAN CYST REMOVAL Right 1977    PRE-MALIGNANT / BENIGN SKIN LESION EXCISION Left 5/2/13    face    TONSILLECTOMY AND ADENOIDECTOMY      TRANSESOPHAGEAL ECHOCARDIOGRAM  05/16/2017    TUBAL LIGATION  1977        Family

## 2024-06-10 RX ORDER — LOSARTAN POTASSIUM 50 MG/1
TABLET ORAL
Qty: 180 TABLET | Refills: 0 | Status: SHIPPED | OUTPATIENT
Start: 2024-06-10 | End: 2024-06-11

## 2024-06-11 RX ORDER — LOSARTAN POTASSIUM 50 MG/1
TABLET ORAL
Qty: 180 TABLET | Refills: 3 | Status: SHIPPED | OUTPATIENT
Start: 2024-06-11

## 2024-06-12 ENCOUNTER — OFFICE VISIT (OUTPATIENT)
Dept: CARDIOLOGY | Age: 69
End: 2024-06-12
Payer: MEDICARE

## 2024-06-12 ENCOUNTER — HOSPITAL ENCOUNTER (OUTPATIENT)
Dept: PHARMACY | Age: 69
Setting detail: THERAPIES SERIES
Discharge: HOME | End: 2024-06-12
Payer: MEDICARE

## 2024-06-12 VITALS
SYSTOLIC BLOOD PRESSURE: 151 MMHG | DIASTOLIC BLOOD PRESSURE: 77 MMHG | BODY MASS INDEX: 18.44 KG/M2 | HEART RATE: 66 BPM | WEIGHT: 108 LBS | OXYGEN SATURATION: 97 % | HEIGHT: 64 IN | RESPIRATION RATE: 18 BRPM

## 2024-06-12 VITALS
WEIGHT: 107 LBS | SYSTOLIC BLOOD PRESSURE: 163 MMHG | BODY MASS INDEX: 18.36 KG/M2 | HEART RATE: 63 BPM | DIASTOLIC BLOOD PRESSURE: 72 MMHG

## 2024-06-12 DIAGNOSIS — Z95.0 CARDIAC PACEMAKER IN SITU: ICD-10-CM

## 2024-06-12 DIAGNOSIS — Z86.79 S/P ABLATION OF ATRIAL FIBRILLATION: ICD-10-CM

## 2024-06-12 DIAGNOSIS — I48.0 PAF (PAROXYSMAL ATRIAL FIBRILLATION) (HCC): ICD-10-CM

## 2024-06-12 DIAGNOSIS — I10 ESSENTIAL HYPERTENSION: ICD-10-CM

## 2024-06-12 DIAGNOSIS — E78.2 MIXED HYPERLIPIDEMIA: ICD-10-CM

## 2024-06-12 DIAGNOSIS — I45.5 SINUS PAUSE: ICD-10-CM

## 2024-06-12 DIAGNOSIS — Z95.1 S/P CABG X 2: ICD-10-CM

## 2024-06-12 DIAGNOSIS — Z95.2 AORTIC VALVE REPLACED: ICD-10-CM

## 2024-06-12 DIAGNOSIS — I49.5 TACHY-BRADY SYNDROME (HCC): ICD-10-CM

## 2024-06-12 DIAGNOSIS — Z95.2 S/P AORTIC VALVE AND MITRAL VALVE REPLACEMENT: ICD-10-CM

## 2024-06-12 DIAGNOSIS — I25.10 ASHD (ARTERIOSCLEROTIC HEART DISEASE): ICD-10-CM

## 2024-06-12 DIAGNOSIS — Z95.2 S/P MVR (MITRAL VALVE REPLACEMENT): Primary | ICD-10-CM

## 2024-06-12 DIAGNOSIS — Z98.890 S/P ABLATION OF ATRIAL FIBRILLATION: ICD-10-CM

## 2024-06-12 LAB — INR BLD: 3.6

## 2024-06-12 PROCEDURE — 99212 OFFICE O/P EST SF 10 MIN: CPT

## 2024-06-12 PROCEDURE — 93000 ELECTROCARDIOGRAM COMPLETE: CPT | Performed by: INTERNAL MEDICINE

## 2024-06-12 PROCEDURE — 85610 PROTHROMBIN TIME: CPT

## 2024-06-12 NOTE — PROGRESS NOTES
[facility]  Medication Management  ANTICOAGULATION    Referring Provider: Dr Velazquez     GOAL INR: 2.5 - 3.5     TODAY'S INR: 3.6     WARFARIN Dosage: Hold warfarin today then continue warfarin 5 mg Monday,Thursday and half tablet for 2.5 mg all other days    INR (no units)   Date Value   2024 2.6   2024 2.4   2024 3.4   2024 4.5   2024 3.2   2023 3.2   2023 2.6       Hemoglobin   Date Value Ref Range Status   2024 13.0 11.9 - 15.1 g/dL Final     Hematocrit   Date Value Ref Range Status   2024 40.4 36.3 - 47.1 % Final     ALT   Date Value Ref Range Status   2024 31 5 - 33 U/L Final     AST   Date Value Ref Range Status   2024 34 (H) <32 U/L Final       Medication changes:  none    Notes:    Fingerstick INR drawn per clinic protocol. Patient states no visible blood in urine and no black tarry stool. Denies any missed doses of warfarin. No change in other maintenance medications or in diet. Will recheck INR in 4 weeks. Patient acknowledges working in consult agreement with pharmacist as referred by his/her physician.                  For Pharmacy Admin Tracking Only    Intervention Detail: Adherence Monitorin and Dose Adjustment: 1, reason: Therapy Optimization  Total # of Interventions Recommended: 2  Total # of Interventions Accepted: 2  Time Spent (min): 20      Ofelia Sterling RP, PharmD

## 2024-06-12 NOTE — PROGRESS NOTES
ventricular cavity size is within normal limits and the left ventricular wall thickness is within normal limits. The inferoseptal wall is abnormal in its motion which is not unusual status post open heart surgery. S/P Aortic valve replacement with 21 mm CE valve, mean gradient across the aortic valve is 5 mmHg. No significant aortic regurgitation. S/P mechanical MVR with a mean gradient of 6.3 mmHg. No significant mitral regurgitation. S/P tricuspid valve repair with a mean gradient of 3 mmHg. Mild to moderate tricuspid regurgitation. Mild pulmonary hypertension with an estimated right ventricular systolic pressure of 35 mmHg. Evidence of moderate diastolic dysfunction is seen. Compared to the previous study of 4/16/2021, no significant change was seen.    CAM monitor done on 10/10/22: 3 days and 10 hours recorded. Baseline rhythm is atrial fibrillation throughout with occasional atrial flutter. Average heart rate 90 bpm, ranges between 54 and 181 bpm. Rare premature ventricular contractions (PVCs burden 0.61%)      CAM monitor done 3/23/2023-   1. Predominant rhythm: Atrial tachycardia.  2. Atrial tachycardia (AT) 1 episode, 3.2 d at average 88 bpm up to 182 bpm.  3. PVC 0.2%.  There were 3.2 days recorded. Baseline rhythm is ectopic atrial tachycardia, occasionally with variable conduction. Cannot rule out atrial fibrillation. Average heart rate is 88 bpm, ranging between 47 and 182 bpm. Clinical correlation indicated. Patient scheduled for ablation at the [facility].    A-Fib ablation done on 4/26/2023 at [facility] with Dr Palencia.    An alert popped up last night showing a 4.5 second pause associated with near passing out episodes.      Pacemaker inserted on 5/19/2023 at [facility].     Echo done 1/17/2024: Left Ventricle: Low normal left ventricular systolic function with a visually estimated EF of 50 - 55%. Left ventricle size is normal. Normal wall thickness. Septal motion is consistent with post-operative status. Global

## 2024-07-09 ENCOUNTER — HOSPITAL ENCOUNTER (OUTPATIENT)
Dept: ULTRASOUND IMAGING | Age: 69
Discharge: HOME | End: 2024-07-11
Payer: MEDICARE

## 2024-07-09 DIAGNOSIS — R74.8 ELEVATED LIVER ENZYMES: ICD-10-CM

## 2024-07-09 DIAGNOSIS — D69.6 LOW PLATELET COUNT (HCC): ICD-10-CM

## 2024-07-09 PROCEDURE — 76705 ECHO EXAM OF ABDOMEN: CPT

## 2024-07-09 PROCEDURE — 76981 USE PARENCHYMA: CPT

## 2024-07-10 ENCOUNTER — TELEPHONE (OUTPATIENT)
Dept: GASTROENTEROLOGY | Age: 69
End: 2024-07-10

## 2024-07-10 NOTE — TELEPHONE ENCOUNTER
----- Message from USMAA Zheng - CNP sent at 7/10/2024  8:29 AM EDT -----  Please notify Beth that her elastography returned as unremarkable with absent to mild fibrosis.  Thanks, Zoë

## 2024-07-16 ENCOUNTER — APPOINTMENT (OUTPATIENT)
Dept: PHARMACY | Age: 69
End: 2024-07-16
Payer: MEDICARE

## 2024-07-23 ENCOUNTER — HOSPITAL ENCOUNTER (OUTPATIENT)
Dept: PHARMACY | Age: 69
Setting detail: THERAPIES SERIES
Discharge: HOME | End: 2024-07-23
Payer: MEDICARE

## 2024-07-23 VITALS
HEART RATE: 69 BPM | WEIGHT: 106 LBS | SYSTOLIC BLOOD PRESSURE: 154 MMHG | BODY MASS INDEX: 18.19 KG/M2 | DIASTOLIC BLOOD PRESSURE: 70 MMHG

## 2024-07-23 DIAGNOSIS — Z95.2 S/P MVR (MITRAL VALVE REPLACEMENT): Primary | ICD-10-CM

## 2024-07-23 DIAGNOSIS — Z95.2 AORTIC VALVE REPLACED: ICD-10-CM

## 2024-07-23 LAB — INR BLD: 2.5

## 2024-07-23 PROCEDURE — 99211 OFF/OP EST MAY X REQ PHY/QHP: CPT | Performed by: COUNSELOR

## 2024-07-23 PROCEDURE — 85610 PROTHROMBIN TIME: CPT | Performed by: COUNSELOR

## 2024-07-23 NOTE — PROGRESS NOTES
[facility]  Medication Management  ANTICOAGULATION    Referring Provider: Dr Velazquez     GOAL INR: 2.5 - 3.5     TODAY'S INR: 2.5     WARFARIN Dosage: Continue warfarin 5 mg Monday,Thursday and half tablet for 2.5 mg all other days    INR (no units)   Date Value   2024 2.5   2024 3.6   2024 2.6   2024 2.4   2024 3.4   2024 4.5   2024 3.2       Hemoglobin   Date Value Ref Range Status   2024 13.0 11.9 - 15.1 g/dL Final     Hematocrit   Date Value Ref Range Status   2024 40.4 36.3 - 47.1 % Final     ALT   Date Value Ref Range Status   2024 31 5 - 33 U/L Final     AST   Date Value Ref Range Status   2024 34 (H) <32 U/L Final       Medication changes:  none    Notes:    Fingerstick INR drawn per clinic protocol. Patient states no visible blood in urine and no black tarry stool. Denies any missed doses of warfarin. No change in other maintenance medications or in diet. Will recheck INR in 6 weeks. Patient acknowledges working in consult agreement with pharmacist as referred by his/her physician.                  For Pharmacy Admin Tracking Only    Intervention Detail: Adherence Monitorin  Total # of Interventions Recommended: 1  Total # of Interventions Accepted: 1  Time Spent (min): 20     Annika Hernandez RP

## 2024-09-03 ENCOUNTER — ANTI-COAG VISIT (OUTPATIENT)
Age: 69
End: 2024-09-03
Payer: MEDICARE

## 2024-09-03 VITALS
BODY MASS INDEX: 18.85 KG/M2 | HEART RATE: 65 BPM | SYSTOLIC BLOOD PRESSURE: 169 MMHG | DIASTOLIC BLOOD PRESSURE: 81 MMHG | WEIGHT: 109.8 LBS

## 2024-09-03 DIAGNOSIS — Z95.2 AORTIC VALVE REPLACED: ICD-10-CM

## 2024-09-03 DIAGNOSIS — Z95.2 S/P MVR (MITRAL VALVE REPLACEMENT): Primary | ICD-10-CM

## 2024-09-03 DIAGNOSIS — I48.91 ATRIAL FIBRILLATION, UNSPECIFIED TYPE (HCC): ICD-10-CM

## 2024-09-03 LAB
INTERNATIONAL NORMALIZATION RATIO, POC: 2.1
PROTHROMBIN TIME, POC: 0

## 2024-09-03 PROCEDURE — 99212 OFFICE O/P EST SF 10 MIN: CPT

## 2024-09-03 PROCEDURE — 85610 PROTHROMBIN TIME: CPT

## 2024-09-03 NOTE — PROGRESS NOTES
[facility]  ANTICOAGULATION    Referring Provider: Dr Velazquez     GOAL INR: 2.5 - 3.5     TODAY'S INR: 2.1     WARFARIN Dosage: Warfarin 5 mg (full tablet) today, then continue warfarin 5 mg Monday,Thursday and half tablet for 2.5 mg all other days     INR (no units)   Date Value   2024 2.5   2024 3.6   2024 2.6   2024 2.4   2024 3.4   2024 4.5   2024 3.2       Hemoglobin   Date Value Ref Range Status   2024 13.0 11.9 - 15.1 g/dL Final     Hematocrit   Date Value Ref Range Status   2024 40.4 36.3 - 47.1 % Final     ALT   Date Value Ref Range Status   2024 31 5 - 33 U/L Final     AST   Date Value Ref Range Status   2024 34 (H) <32 U/L Final       Medication changes:  none    Notes:    Fingerstick INR drawn per clinic protocol. Patient states no visible blood in urine and no black tarry stool. Denies any missed doses of warfarin. No change in other maintenance medications. Will recheck INR in 5 weeks. Patient acknowledges working in consult agreement with pharmacist as referred by his/her physician.                  Patient has been eating more salads and increased amount of pickled cabbage she is consuming.  Patients BP was elevated today, but she said that is beginning to be her baseline BP.  Encouraged her to monitor BP at home.    For Pharmacy Admin Tracking Only    Intervention Detail: Adherence Monitorin and Dose Adjustment: 1, reason: Therapy Optimization  Total # of Interventions Recommended: 2  Total # of Interventions Accepted: 2  Time Spent (min): 20    Anai Delgado RPH, PharmD

## 2024-09-17 ENCOUNTER — OFFICE VISIT (OUTPATIENT)
Dept: GASTROENTEROLOGY | Age: 69
End: 2024-09-17
Payer: MEDICARE

## 2024-09-17 VITALS
RESPIRATION RATE: 18 BRPM | HEART RATE: 59 BPM | OXYGEN SATURATION: 96 % | WEIGHT: 110 LBS | BODY MASS INDEX: 18.88 KG/M2 | SYSTOLIC BLOOD PRESSURE: 151 MMHG | DIASTOLIC BLOOD PRESSURE: 84 MMHG

## 2024-09-17 DIAGNOSIS — Z53.20 COLON CANCER SCREENING DECLINED: ICD-10-CM

## 2024-09-17 DIAGNOSIS — R74.8 ELEVATED LIVER ENZYMES: Primary | ICD-10-CM

## 2024-09-17 PROCEDURE — 1123F ACP DISCUSS/DSCN MKR DOCD: CPT | Performed by: NURSE PRACTITIONER

## 2024-09-17 PROCEDURE — 99213 OFFICE O/P EST LOW 20 MIN: CPT | Performed by: NURSE PRACTITIONER

## 2024-09-17 ASSESSMENT — ENCOUNTER SYMPTOMS
ANAL BLEEDING: 0
COLOR CHANGE: 0
CONSTIPATION: 0
BLOOD IN STOOL: 0
ABDOMINAL DISTENTION: 0
ABDOMINAL PAIN: 0

## 2024-10-08 ENCOUNTER — ANTI-COAG VISIT (OUTPATIENT)
Age: 69
End: 2024-10-08
Payer: MEDICARE

## 2024-10-08 VITALS
HEART RATE: 73 BPM | BODY MASS INDEX: 18.88 KG/M2 | DIASTOLIC BLOOD PRESSURE: 77 MMHG | SYSTOLIC BLOOD PRESSURE: 160 MMHG | WEIGHT: 110 LBS

## 2024-10-08 DIAGNOSIS — Z95.2 AORTIC VALVE REPLACED: ICD-10-CM

## 2024-10-08 DIAGNOSIS — Z95.2 S/P MVR (MITRAL VALVE REPLACEMENT): Primary | ICD-10-CM

## 2024-10-08 LAB
INTERNATIONAL NORMALIZATION RATIO, POC: 2.4
PROTHROMBIN TIME, POC: 0

## 2024-10-08 PROCEDURE — 99212 OFFICE O/P EST SF 10 MIN: CPT | Performed by: COUNSELOR

## 2024-10-08 PROCEDURE — 85610 PROTHROMBIN TIME: CPT | Performed by: COUNSELOR

## 2024-10-08 NOTE — PATIENT INSTRUCTIONS
Please take warfarin 5 mg today.   Increase current dose of warfarin as instructed on dosing calendar provided.   Continue to monitor urine and stool for signs and symptoms of bleeding.   Please notify the clinic of any medication changes.   Please remember to bring all medications (both prescription and OTC) to your next visit. Kindly notify the clinic if you are unable to make to your next appointment.

## 2024-10-08 NOTE — PROGRESS NOTES
[facility]  Medication Management  ANTICOAGULATION    Referring Provider: Dr. Velazquez    GOAL INR: 2.5-3.5    TODAY'S INR: 2.4    WARFARIN Dosage: 5 mg x 1, then increase warfarin to 5 mg po every MWF; 2.5 mg po all other days    INR (no units)   Date Value   2024 2.5   2024 3.6   2024 2.6   2024 2.4   2024 3.4   2024 4.5   2024 3.2     INR,(POC) (no units)   Date Value   10/08/2024 2.4   2024 2.1       Hemoglobin   Date Value Ref Range Status   2024 13.0 11.9 - 15.1 g/dL Final     Hematocrit   Date Value Ref Range Status   2024 40.4 36.3 - 47.1 % Final     ALT   Date Value Ref Range Status   2024 31 5 - 33 U/L Final     AST   Date Value Ref Range Status   2024 34 (H) <32 U/L Final       Medication changes:  No changes    Notes:    Fingerstick INR drawn per clinic protocol. Patient states no visible blood in urine and no black tarry stool. Denies any missed doses of warfarin. No change in other maintenance medications or in diet. Will recheck INR in 2 weeks. Patient acknowledges working in consult agreement with pharmacist as referred by his/her physician.                  For Pharmacy Admin Tracking Only    Intervention Detail: Adherence Monitorin and Dose Adjustment: 2, reason: Improve Adherence, Therapy Optimization  Total # of Interventions Recommended: 3  Total # of Interventions Accepted: 3  Time Spent (min): 20      Annika Hernandez [facility]

## 2024-10-22 ENCOUNTER — ANTI-COAG VISIT (OUTPATIENT)
Age: 69
End: 2024-10-22
Payer: MEDICARE

## 2024-10-22 VITALS
DIASTOLIC BLOOD PRESSURE: 78 MMHG | SYSTOLIC BLOOD PRESSURE: 163 MMHG | HEART RATE: 76 BPM | BODY MASS INDEX: 18.88 KG/M2 | WEIGHT: 110 LBS

## 2024-10-22 DIAGNOSIS — Z95.2 AORTIC VALVE REPLACED: ICD-10-CM

## 2024-10-22 DIAGNOSIS — Z95.2 S/P MVR (MITRAL VALVE REPLACEMENT): Primary | ICD-10-CM

## 2024-10-22 LAB
INTERNATIONAL NORMALIZATION RATIO, POC: 3.5
PROTHROMBIN TIME, POC: 0

## 2024-10-22 PROCEDURE — 85610 PROTHROMBIN TIME: CPT | Performed by: COUNSELOR

## 2024-10-22 PROCEDURE — 99211 OFF/OP EST MAY X REQ PHY/QHP: CPT | Performed by: COUNSELOR

## 2024-10-22 NOTE — PROGRESS NOTES
[facility]  Medication Management  ANTICOAGULATION    Referring Provider: Dr. Velazquez    GOAL INR: 2.5 - 3.5    TODAY'S INR: 3.5    WARFARIN Dosage: Continue 5 mg po every MWF; 2.5 mg po all other days    INR (no units)   Date Value   2024 2.5   2024 3.6   2024 2.6   2024 2.4   2024 3.4   2024 4.5   2024 3.2     INR,(POC) (no units)   Date Value   10/22/2024 3.5   10/08/2024 2.4   2024 2.1       Hemoglobin   Date Value Ref Range Status   2024 13.0 11.9 - 15.1 g/dL Final     Hematocrit   Date Value Ref Range Status   2024 40.4 36.3 - 47.1 % Final     ALT   Date Value Ref Range Status   2024 31 5 - 33 U/L Final     AST   Date Value Ref Range Status   2024 34 (H) <32 U/L Final       Medication changes:  No changes    Notes:    Fingerstick INR drawn per clinic protocol. Patient states no visible blood in urine and no black tarry stool. Denies any missed doses of warfarin. No change in other maintenance medications or in diet. Will recheck INR in 4 weeks. Patient acknowledges working in consult agreement with pharmacist as referred by his/her physician.                  For Pharmacy Admin Tracking Only    Intervention Detail: Adherence Monitorin  Total # of Interventions Recommended: 1  Total # of Interventions Accepted: 1  Time Spent (min): 20      Annika Hernandez [facility]

## 2024-11-04 RX ORDER — METOPROLOL TARTRATE 50 MG
50 TABLET ORAL 2 TIMES DAILY
Qty: 180 TABLET | Refills: 0 | Status: SHIPPED | OUTPATIENT
Start: 2024-11-04 | End: 2024-11-05

## 2024-11-05 RX ORDER — METOPROLOL TARTRATE 50 MG
50 TABLET ORAL 2 TIMES DAILY
Qty: 180 TABLET | Refills: 0 | Status: SHIPPED | OUTPATIENT
Start: 2024-11-05

## 2024-11-14 DIAGNOSIS — E78.2 MIXED HYPERLIPIDEMIA: ICD-10-CM

## 2024-11-14 DIAGNOSIS — I25.10 ASHD (ARTERIOSCLEROTIC HEART DISEASE): Primary | ICD-10-CM

## 2024-11-14 DIAGNOSIS — I48.0 PAF (PAROXYSMAL ATRIAL FIBRILLATION) (HCC): ICD-10-CM

## 2024-11-14 DIAGNOSIS — Z95.1 S/P CABG X 2: ICD-10-CM

## 2024-11-14 RX ORDER — ATORVASTATIN CALCIUM 20 MG/1
TABLET, FILM COATED ORAL
Qty: 90 TABLET | Refills: 3 | Status: SHIPPED | OUTPATIENT
Start: 2024-11-14

## 2024-11-19 ENCOUNTER — ANTI-COAG VISIT (OUTPATIENT)
Age: 69
End: 2024-11-19
Payer: MEDICARE

## 2024-11-19 VITALS
HEART RATE: 66 BPM | SYSTOLIC BLOOD PRESSURE: 177 MMHG | WEIGHT: 110 LBS | DIASTOLIC BLOOD PRESSURE: 82 MMHG | BODY MASS INDEX: 18.88 KG/M2

## 2024-11-19 DIAGNOSIS — I48.91 ATRIAL FIBRILLATION, UNSPECIFIED TYPE (HCC): ICD-10-CM

## 2024-11-19 DIAGNOSIS — Z95.2 S/P MVR (MITRAL VALVE REPLACEMENT): Primary | ICD-10-CM

## 2024-11-19 DIAGNOSIS — Z95.2 AORTIC VALVE REPLACED: ICD-10-CM

## 2024-11-19 LAB
INTERNATIONAL NORMALIZATION RATIO, POC: 4.5
PROTHROMBIN TIME, POC: 0

## 2024-11-19 PROCEDURE — 85610 PROTHROMBIN TIME: CPT

## 2024-11-19 PROCEDURE — 99212 OFFICE O/P EST SF 10 MIN: CPT

## 2024-11-19 NOTE — PROGRESS NOTES
[facility]  Medication [facility]  ANTICOAGULATION    Referring Provider: Dr. Velazquez     GOAL INR: 2.5 - 3.5     TODAY'S INR: 4.5     WARFARIN Dosage: Hold Warfarin today, take half dose tomorrow for 2.5 mg (half tablet) and then continue 5 mg po every MWF; 2.5 mg po all other days    INR (no units)   Date Value   07/23/2024 2.5   06/12/2024 3.6   04/30/2024 2.6   03/19/2024 2.4   02/23/2024 3.4   02/08/2024 4.5   01/17/2024 3.2     INR,(POC) (no units)   Date Value   10/22/2024 3.5   10/08/2024 2.4   09/03/2024 2.1       Hemoglobin   Date Value Ref Range Status   05/07/2024 13.0 11.9 - 15.1 g/dL Final     Hematocrit   Date Value Ref Range Status   05/07/2024 40.4 36.3 - 47.1 % Final     ALT   Date Value Ref Range Status   05/07/2024 31 5 - 33 U/L Final     AST   Date Value Ref Range Status   05/07/2024 34 (H) <32 U/L Final       Medication changes:  Using Excedrin Migraine more frequently    Notes:    Fingerstick INR drawn per clinic protocol. Patient states no visible blood in urine and no black tarry stool. Denies any missed doses of warfarin. No change in other maintenance medications or in diet. Will recheck INR in 2 weeks. Patient acknowledges working in consult agreement with pharmacist as referred by his/her physician.         Patient has been using her Excedrin migraine more frequently, but has been skipping her baby ASA on the days she uses the excedrin migraine.             Patients blood pressure was elevated during visit.  She said that she does take her BP at home with a wrist cuff.  It has been normal at home.  Patient also states she had two episodes yesterday with heart rate in the 130's.  One of the episodes lasted over 20 minutes.  She does have a pacemaker.  In the waiting room today she thought that her heart went into atrial fibrillation, but back to NSR within a few seconds.  I did call Dr Velazquez's office and left a voicemail with her symptoms and situation.  I am

## 2024-12-03 ENCOUNTER — APPOINTMENT (OUTPATIENT)
Age: 69
End: 2024-12-03
Payer: MEDICARE

## 2024-12-10 ENCOUNTER — ANTI-COAG VISIT (OUTPATIENT)
Age: 69
End: 2024-12-10
Payer: MEDICARE

## 2024-12-10 VITALS
WEIGHT: 110 LBS | BODY MASS INDEX: 18.88 KG/M2 | SYSTOLIC BLOOD PRESSURE: 164 MMHG | HEART RATE: 71 BPM | DIASTOLIC BLOOD PRESSURE: 80 MMHG

## 2024-12-10 DIAGNOSIS — Z95.2 AORTIC VALVE REPLACED: ICD-10-CM

## 2024-12-10 DIAGNOSIS — Z95.2 S/P MVR (MITRAL VALVE REPLACEMENT): Primary | ICD-10-CM

## 2024-12-10 LAB
INTERNATIONAL NORMALIZATION RATIO, POC: 3.9
PROTHROMBIN TIME, POC: 0

## 2024-12-10 PROCEDURE — 85610 PROTHROMBIN TIME: CPT | Performed by: COUNSELOR

## 2024-12-10 PROCEDURE — 99212 OFFICE O/P EST SF 10 MIN: CPT | Performed by: COUNSELOR

## 2024-12-10 NOTE — PROGRESS NOTES
[facility]  Medication Management  ANTICOAGULATION       Referring Provider: Dr. Velazquez     GOAL INR: 2.5 - 3.5     TODAY'S INR: 3.9     WARFARIN Dosage: Hold Warfarin today, then decrease warfarin to 5 mg po every Monday and Thursday;  2.5 mg po all other days       INR (no units)   Date Value   2024 2.5   2024 3.6   2024 2.6   2024 2.4   2024 3.4   2024 4.5   2024 3.2     INR,(POC) (no units)   Date Value   12/10/2024 3.9   2024 4.5   10/22/2024 3.5   10/08/2024 2.4   2024 2.1       Hemoglobin   Date Value Ref Range Status   2024 13.0 11.9 - 15.1 g/dL Final     Hematocrit   Date Value Ref Range Status   2024 40.4 36.3 - 47.1 % Final     ALT   Date Value Ref Range Status   2024 31 5 - 33 U/L Final     AST   Date Value Ref Range Status   2024 34 (H) <32 U/L Final       Medication changes:  No changes    Notes:    Fingerstick INR drawn per clinic protocol. Patient states no visible blood in urine and no black tarry stool. Denies any missed doses of warfarin. No change in other maintenance medications or in diet. Will recheck INR in 2 weeks. Patient acknowledges working in consult agreement with pharmacist as referred by his/her physician.       Patient reports episodes of tachycardia and chest pain - week of 24 and yesterday for 3 episodes.  I sent a message to Dr. Velazquez.             For Pharmacy Admin Tracking Only    Intervention Detail: Adherence Monitorin and Dose Adjustment: 2, reason: Improve Adherence, Therapy De-escalation, Therapy Optimization  Total # of Interventions Recommended: 3  Total # of Interventions Accepted: 3  Time Spent (min): 30      Annika Hernandez RPH

## 2024-12-18 DIAGNOSIS — Z95.2 S/P MVR (MITRAL VALVE REPLACEMENT): Primary | ICD-10-CM

## 2024-12-23 ENCOUNTER — ANTI-COAG VISIT (OUTPATIENT)
Age: 69
End: 2024-12-23
Payer: MEDICARE

## 2024-12-23 VITALS
HEART RATE: 83 BPM | SYSTOLIC BLOOD PRESSURE: 120 MMHG | DIASTOLIC BLOOD PRESSURE: 65 MMHG | BODY MASS INDEX: 18.71 KG/M2 | WEIGHT: 109 LBS

## 2024-12-23 DIAGNOSIS — Z95.2 S/P MVR (MITRAL VALVE REPLACEMENT): Primary | ICD-10-CM

## 2024-12-23 DIAGNOSIS — Z95.2 AORTIC VALVE REPLACED: ICD-10-CM

## 2024-12-23 LAB
INTERNATIONAL NORMALIZATION RATIO, POC: 2.4
PROTHROMBIN TIME, POC: 0

## 2024-12-23 PROCEDURE — 85610 PROTHROMBIN TIME: CPT

## 2024-12-23 PROCEDURE — 99211 OFF/OP EST MAY X REQ PHY/QHP: CPT

## 2024-12-23 NOTE — PROGRESS NOTES
[facility]  Medication Management  ANTICOAGULATION    Referring Provider: Dr. Velazquez     GOAL INR: 2.5 - 3.5     TODAY'S INR: 2.4     WARFARIN Dosage: Continue warfarin 5 mg Monday, Thursday and half tablet for 2.5 mg all other days.    INR (no units)   Date Value   2024 2.5   2024 3.6   2024 2.6   2024 2.4   2024 3.4   2024 4.5   2024 3.2     INR,(POC) (no units)   Date Value   12/10/2024 3.9   2024 4.5   10/22/2024 3.5   10/08/2024 2.4   2024 2.1       Hemoglobin   Date Value Ref Range Status   2024 13.0 11.9 - 15.1 g/dL Final     Hematocrit   Date Value Ref Range Status   2024 40.4 36.3 - 47.1 % Final     ALT   Date Value Ref Range Status   2024 31 5 - 33 U/L Final     AST   Date Value Ref Range Status   2024 34 (H) <32 U/L Final       Medication changes:  none    Notes:    Fingerstick INR drawn per clinic protocol. Patient states no visible blood in urine and no black tarry stool. Denies any missed doses of warfarin. No change in other maintenance medications or in diet. Will recheck INR in 2 weeks. Patient acknowledges working in consult agreement with pharmacist as referred by his/her physician.                  For Pharmacy Admin Tracking Only    Intervention Detail: Adherence Monitorin and Dose Adjustment: 1, reason: Therapy Optimization  Total # of Interventions Recommended: 2  Total # of Interventions Accepted: 2  Time Spent (min): 20      Ofelia Sterling [facility], PharmD

## 2025-01-07 ENCOUNTER — ANTI-COAG VISIT (OUTPATIENT)
Age: 70
End: 2025-01-07
Payer: MEDICARE

## 2025-01-07 DIAGNOSIS — Z95.2 S/P MVR (MITRAL VALVE REPLACEMENT): Primary | ICD-10-CM

## 2025-01-07 DIAGNOSIS — Z95.2 AORTIC VALVE REPLACED: ICD-10-CM

## 2025-01-07 LAB
INTERNATIONAL NORMALIZATION RATIO, POC: 2.9
PROTHROMBIN TIME, POC: 0

## 2025-01-07 PROCEDURE — 99211 OFF/OP EST MAY X REQ PHY/QHP: CPT

## 2025-01-07 PROCEDURE — 85610 PROTHROMBIN TIME: CPT

## 2025-01-07 NOTE — PROGRESS NOTES
[facility]  Medication Management  ANTICOAGULATION    Referring Provider: Dr. Velazquez     GOAL INR: 2.5 - 3.5     TODAY'S INR: 2.9     WARFARIN Dosage: Continue warfarin 5 mg Monday, Thursday and half tablet for 2.5 mg all other days.    INR (no units)   Date Value   2024 2.5   2024 3.6   2024 2.6   2024 2.4   2024 3.4   2024 4.5   2024 3.2     INR,(POC) (no units)   Date Value   2024 2.4   12/10/2024 3.9   2024 4.5   10/22/2024 3.5   10/08/2024 2.4   2024 2.1       Hemoglobin   Date Value Ref Range Status   2024 13.0 11.9 - 15.1 g/dL Final     Hematocrit   Date Value Ref Range Status   2024 40.4 36.3 - 47.1 % Final     ALT   Date Value Ref Range Status   2024 31 5 - 33 U/L Final     AST   Date Value Ref Range Status   2024 34 (H) <32 U/L Final       Medication changes:  none    Notes:    Fingerstick INR drawn per clinic protocol. Patient states no visible blood in urine and no black tarry stool. Denies any missed doses of warfarin. No change in other maintenance medications or in diet. Will recheck INR in 4 weeks. Patient acknowledges working in consult agreement with pharmacist as referred by his/her physician.                  For Pharmacy Admin Tracking Only    Intervention Detail: Adherence Monitorin  Total # of Interventions Recommended: 1  Total # of Interventions Accepted: 1  Time Spent (min): 20      Ofelia Sterling RP, PharmD

## 2025-02-04 ENCOUNTER — ANTI-COAG VISIT (OUTPATIENT)
Age: 70
End: 2025-02-04
Payer: MEDICARE

## 2025-02-04 VITALS
WEIGHT: 107 LBS | SYSTOLIC BLOOD PRESSURE: 153 MMHG | HEART RATE: 83 BPM | DIASTOLIC BLOOD PRESSURE: 80 MMHG | BODY MASS INDEX: 18.37 KG/M2

## 2025-02-04 DIAGNOSIS — Z95.2 AORTIC VALVE REPLACED: ICD-10-CM

## 2025-02-04 DIAGNOSIS — Z95.2 S/P MVR (MITRAL VALVE REPLACEMENT): Primary | ICD-10-CM

## 2025-02-04 LAB
INTERNATIONAL NORMALIZATION RATIO, POC: 2.6
PROTHROMBIN TIME, POC: 0

## 2025-02-04 PROCEDURE — 85610 PROTHROMBIN TIME: CPT | Performed by: COUNSELOR

## 2025-02-04 PROCEDURE — 99211 OFF/OP EST MAY X REQ PHY/QHP: CPT | Performed by: COUNSELOR

## 2025-02-04 NOTE — PROGRESS NOTES
[facility]  Medication Management  ANTICOAGULATION    Referring Provider: Dr. Velazquez    GOAL INR: 2.5-3.5    TODAY'S INR: 2.6    WARFARIN Dosage: Continue 5 mg po every Monday and Thursday; 2.5 mg po all other days    INR (no units)   Date Value   2024 2.5   2024 3.6   2024 2.6   2024 2.4   2024 3.4   2024 4.5   2024 3.2     INR,(POC) (no units)   Date Value   2025 2.6   2025 2.9   2024 2.4   12/10/2024 3.9   2024 4.5   10/22/2024 3.5   10/08/2024 2.4       Hemoglobin   Date Value Ref Range Status   2024 13.0 11.9 - 15.1 g/dL Final     Hematocrit   Date Value Ref Range Status   2024 40.4 36.3 - 47.1 % Final     ALT   Date Value Ref Range Status   2024 31 5 - 33 U/L Final     AST   Date Value Ref Range Status   2024 34 (H) <32 U/L Final       Medication changes:  No changes    Notes:    Fingerstick INR drawn per clinic protocol. Patient states no visible blood in urine and no black tarry stool. Denies any missed doses of warfarin. No change in other maintenance medications or in diet. Will recheck INR in 4 weeks. Patient acknowledges working in consult agreement with pharmacist as referred by his/her physician.                  For Pharmacy Admin Tracking Only    Intervention Detail: Adherence Monitorin  Total # of Interventions Recommended: 1  Total # of Interventions Accepted: 1  Time Spent (min): 20      Annika Hernandez RPH

## 2025-03-04 ENCOUNTER — ANTI-COAG VISIT (OUTPATIENT)
Age: 70
End: 2025-03-04
Payer: MEDICARE

## 2025-03-04 VITALS
BODY MASS INDEX: 18.54 KG/M2 | DIASTOLIC BLOOD PRESSURE: 83 MMHG | WEIGHT: 108 LBS | SYSTOLIC BLOOD PRESSURE: 169 MMHG | HEART RATE: 75 BPM

## 2025-03-04 DIAGNOSIS — Z95.2 AORTIC VALVE REPLACED: ICD-10-CM

## 2025-03-04 DIAGNOSIS — Z95.2 S/P MVR (MITRAL VALVE REPLACEMENT): Primary | ICD-10-CM

## 2025-03-04 LAB
INTERNATIONAL NORMALIZATION RATIO, POC: 3.4
PROTHROMBIN TIME, POC: 0

## 2025-03-04 PROCEDURE — 85610 PROTHROMBIN TIME: CPT | Performed by: COUNSELOR

## 2025-03-04 PROCEDURE — 99211 OFF/OP EST MAY X REQ PHY/QHP: CPT | Performed by: COUNSELOR

## 2025-03-04 NOTE — PROGRESS NOTES
[facility]  Medication Management  ANTICOAGULATION    Referring Provider: Dr. Velazquez    GOAL INR: 2.5-3.5    TODAY'S INR: 3.4    WARFARIN Dosage: Continue 5 mg po every Monday and Thursday; 2.5 mg po all other days    INR (no units)   Date Value   2024 2.5   2024 3.6   2024 2.6   2024 2.4   2024 3.4   2024 4.5   2024 3.2     INR,(POC) (no units)   Date Value   2025 2.6   2025 2.9   2024 2.4   12/10/2024 3.9   2024 4.5   10/22/2024 3.5   10/08/2024 2.4       Hemoglobin   Date Value Ref Range Status   2024 13.0 11.9 - 15.1 g/dL Final     Hematocrit   Date Value Ref Range Status   2024 40.4 36.3 - 47.1 % Final     ALT   Date Value Ref Range Status   2024 31 5 - 33 U/L Final     AST   Date Value Ref Range Status   2024 34 (H) <32 U/L Final       Medication changes:  No changes    Notes:    Fingerstick INR drawn per clinic protocol. Patient states no visible blood in urine and no black tarry stool. Denies any missed doses of warfarin. No change in other maintenance medications or in diet. Will recheck INR in 4 weeks. Patient acknowledges working in consult agreement with pharmacist as referred by his/her physician.                  For Pharmacy Admin Tracking Only    Intervention Detail: Adherence Monitorin  Total # of Interventions Recommended: 1  Total # of Interventions Accepted: 1  Time Spent (min): 20      Annika Hernandez RPH

## 2025-03-09 ENCOUNTER — HOSPITAL ENCOUNTER (EMERGENCY)
Age: 70
Discharge: HOME | End: 2025-03-09
Attending: EMERGENCY MEDICINE
Payer: MEDICARE

## 2025-03-09 VITALS
HEIGHT: 63 IN | DIASTOLIC BLOOD PRESSURE: 77 MMHG | WEIGHT: 108 LBS | BODY MASS INDEX: 19.14 KG/M2 | RESPIRATION RATE: 16 BRPM | TEMPERATURE: 97.7 F | OXYGEN SATURATION: 99 % | HEART RATE: 64 BPM | SYSTOLIC BLOOD PRESSURE: 164 MMHG

## 2025-03-09 DIAGNOSIS — S61.211A LACERATION OF LEFT INDEX FINGER WITHOUT FOREIGN BODY WITHOUT DAMAGE TO NAIL, INITIAL ENCOUNTER: Primary | ICD-10-CM

## 2025-03-09 LAB
INR PPP: 2.4
PROTHROMBIN TIME: 24.9 SEC (ref 11.7–14.1)

## 2025-03-09 PROCEDURE — 90715 TDAP VACCINE 7 YRS/> IM: CPT | Performed by: EMERGENCY MEDICINE

## 2025-03-09 PROCEDURE — 36415 COLL VENOUS BLD VENIPUNCTURE: CPT

## 2025-03-09 PROCEDURE — 90471 IMMUNIZATION ADMIN: CPT | Performed by: EMERGENCY MEDICINE

## 2025-03-09 PROCEDURE — 12001 RPR S/N/AX/GEN/TRNK 2.5CM/<: CPT

## 2025-03-09 PROCEDURE — 6360000002 HC RX W HCPCS: Performed by: EMERGENCY MEDICINE

## 2025-03-09 PROCEDURE — 85610 PROTHROMBIN TIME: CPT

## 2025-03-09 PROCEDURE — 99284 EMERGENCY DEPT VISIT MOD MDM: CPT

## 2025-03-09 RX ORDER — LIDOCAINE HYDROCHLORIDE 10 MG/ML
5 INJECTION, SOLUTION EPIDURAL; INFILTRATION; INTRACAUDAL; PERINEURAL ONCE
Status: COMPLETED | OUTPATIENT
Start: 2025-03-09 | End: 2025-03-09

## 2025-03-09 RX ADMIN — TETANUS TOXOID, REDUCED DIPHTHERIA TOXOID AND ACELLULAR PERTUSSIS VACCINE, ADSORBED 0.5 ML: 5; 2.5; 8; 8; 2.5 SUSPENSION INTRAMUSCULAR at 10:30

## 2025-03-09 RX ADMIN — LIDOCAINE HYDROCHLORIDE 5 ML: 10 INJECTION, SOLUTION EPIDURAL; INFILTRATION; INTRACAUDAL; PERINEURAL at 11:07

## 2025-03-09 ASSESSMENT — PAIN DESCRIPTION - PAIN TYPE: TYPE: ACUTE PAIN

## 2025-03-09 ASSESSMENT — PAIN DESCRIPTION - DESCRIPTORS: DESCRIPTORS: THROBBING

## 2025-03-09 ASSESSMENT — PAIN SCALES - GENERAL: PAINLEVEL_OUTOF10: 1

## 2025-03-09 ASSESSMENT — PAIN - FUNCTIONAL ASSESSMENT: PAIN_FUNCTIONAL_ASSESSMENT: 0-10

## 2025-03-09 ASSESSMENT — PAIN DESCRIPTION - ONSET: ONSET: SUDDEN

## 2025-03-09 ASSESSMENT — PAIN DESCRIPTION - ORIENTATION: ORIENTATION: LEFT

## 2025-03-09 ASSESSMENT — PAIN DESCRIPTION - LOCATION: LOCATION: FINGER (COMMENT WHICH ONE)

## 2025-03-09 ASSESSMENT — PAIN DESCRIPTION - FREQUENCY: FREQUENCY: CONTINUOUS

## 2025-03-09 NOTE — ED PROVIDER NOTES
failure) ([facility])     Congestive heart failure (CHF) ([facility])     Depression 6/11/2015    H/O mitral valve replacement     H/O tricuspid valve repair     Hypertension     Ischemic colitis 2005, 2010    Migraine     Migraine     Migraine     PAF (paroxysmal atrial fibrillation) ([facility])     Rheumatic fever     S/P AVR     SOB (shortness of breath)      Past Surgical History:   Procedure Laterality Date    AORTIC VALVE REPLACEMENT  2008    CARDIAC CATHETERIZATION  2007    CARDIAC VALVE REPLACEMENT  2008    aortic and mitral    CHOLECYSTECTOMY  1997    COLONOSCOPY  2010    MITRAL VALVE REPLACEMENT  2008    OVARIAN CYST REMOVAL Right 1977    PACEMAKER INSERTION  05/19/2023    PRE-MALIGNANT / BENIGN SKIN LESION EXCISION Left 05/02/2013    face    TONSILLECTOMY AND ADENOIDECTOMY      TRANSESOPHAGEAL ECHOCARDIOGRAM  05/16/2017    TUBAL LIGATION  1977            Focused physical examination     Vitals:    03/09/25 0958   BP: (!) 164/77   Pulse: 64   Resp: 16   Temp: 97.7 °F (36.5 °C)   TempSrc: Oral   SpO2: 99%   Weight: 49 kg (108 lb)   Height: 1.6 m (5' 3\")     Physical Exam  Vitals and nursing note reviewed.   Constitutional:       General: She is not in acute distress.     Appearance: Normal appearance. She is not ill-appearing, toxic-appearing or diaphoretic.   HENT:      Head: Normocephalic and atraumatic.      Right Ear: External ear normal.      Left Ear: External ear normal.      Nose: Nose normal. No congestion or rhinorrhea.      Mouth/Throat:      Mouth: Mucous membranes are moist.      Pharynx: No oropharyngeal exudate or posterior oropharyngeal erythema.   Eyes:      General: No scleral icterus.     Conjunctiva/sclera: Conjunctivae normal.      Pupils: Pupils are equal, round, and reactive to light.   Neck:      Vascular: No carotid bruit.   Cardiovascular:      Rate and Rhythm: Normal rate and regular rhythm.      Pulses: Normal pulses.      Heart sounds: Murmur (Consistent with mitral valve tricuspid valve and aortic

## 2025-03-09 NOTE — DISCHARGE INSTRUCTIONS
Your INR today is 2.4 please contact Coumadin clinic concerning any adjustments    Sutures to be removed 7 days    Thank you for trusting your care with us today.

## 2025-03-17 ENCOUNTER — ANTI-COAG VISIT (OUTPATIENT)
Age: 70
End: 2025-03-17
Payer: MEDICARE

## 2025-03-17 VITALS
BODY MASS INDEX: 19.31 KG/M2 | WEIGHT: 109 LBS | SYSTOLIC BLOOD PRESSURE: 152 MMHG | HEART RATE: 71 BPM | DIASTOLIC BLOOD PRESSURE: 74 MMHG

## 2025-03-17 DIAGNOSIS — Z95.2 S/P MVR (MITRAL VALVE REPLACEMENT): Primary | ICD-10-CM

## 2025-03-17 DIAGNOSIS — Z95.2 AORTIC VALVE REPLACED: ICD-10-CM

## 2025-03-17 LAB
INTERNATIONAL NORMALIZATION RATIO, POC: 2.9
PROTHROMBIN TIME, POC: 0

## 2025-03-17 PROCEDURE — 85610 PROTHROMBIN TIME: CPT

## 2025-03-17 PROCEDURE — 99211 OFF/OP EST MAY X REQ PHY/QHP: CPT

## 2025-03-17 NOTE — PROGRESS NOTES
[facility]  Medication Management  ANTICOAGULATION    Referring Provider: Dr. Velazquez     GOAL INR: 2.5 - 3.5     TODAY'S INR: 2.9     WARFARIN Dosage: Continue warfarin 5 mg Monday,Thursday and half tablet for 2.5 mg all other days.    INR (no units)   Date Value   2025 2.4   2024 2.5   2024 3.6   2024 2.6   2024 2.4   2024 3.4   2024 4.5     INR,(POC) (no units)   Date Value   2025 3.4   2025 2.6   2025 2.9   2024 2.4   12/10/2024 3.9   2024 4.5   10/22/2024 3.5       Hemoglobin   Date Value Ref Range Status   2024 13.0 11.9 - 15.1 g/dL Final     Hematocrit   Date Value Ref Range Status   2024 40.4 36.3 - 47.1 % Final     ALT   Date Value Ref Range Status   2024 31 5 - 33 U/L Final     AST   Date Value Ref Range Status   2024 34 (H) <32 U/L Final       Medication changes:  none    Notes:    Fingerstick INR drawn per clinic protocol. Patient states no visible blood in urine and no black tarry stool. Denies any missed doses of warfarin. No change in other maintenance medications or in diet. Will recheck INR in 4 weeks. Patient acknowledges working in consult agreement with pharmacist as referred by his/her physician.   Patient lacerated finger on toaster on 3/9/25 and was in the ER where she received 2 stitches.   INR was 2.4 in the ER.           For Pharmacy Admin Tracking Only    Intervention Detail: Adherence Monitorin  Total # of Interventions Recommended: 1  Total # of Interventions Accepted: 1  Time Spent (min): 20      Ofelia Sterling RP, PharmD

## 2025-04-15 ENCOUNTER — ANTI-COAG VISIT (OUTPATIENT)
Age: 70
End: 2025-04-15
Payer: MEDICARE

## 2025-04-15 VITALS
DIASTOLIC BLOOD PRESSURE: 73 MMHG | SYSTOLIC BLOOD PRESSURE: 159 MMHG | HEIGHT: 63 IN | HEART RATE: 64 BPM | BODY MASS INDEX: 19.31 KG/M2 | WEIGHT: 109 LBS

## 2025-04-15 DIAGNOSIS — Z95.2 AORTIC VALVE REPLACED: ICD-10-CM

## 2025-04-15 DIAGNOSIS — I48.91 ATRIAL FIBRILLATION, UNSPECIFIED TYPE (HCC): ICD-10-CM

## 2025-04-15 DIAGNOSIS — Z95.2 S/P MVR (MITRAL VALVE REPLACEMENT): Primary | ICD-10-CM

## 2025-04-15 LAB
INTERNATIONAL NORMALIZATION RATIO, POC: 2.6
PROTHROMBIN TIME, POC: 0

## 2025-04-15 PROCEDURE — 85610 PROTHROMBIN TIME: CPT | Performed by: FAMILY MEDICINE

## 2025-04-15 PROCEDURE — 99211 OFF/OP EST MAY X REQ PHY/QHP: CPT | Performed by: FAMILY MEDICINE

## 2025-04-15 NOTE — PROGRESS NOTES
[facility]  Medication Management  ANTICOAGULATION    Referring Provider: Dr Velazquez    GOAL INR: 2.5-3.5    TODAY'S INR: 2.6    WARFARIN Dosage: continue 5mg MR, 2.5mg all other days    INR (no units)   Date Value   2025 2.4   2024 2.5   2024 3.6   2024 2.6   2024 2.4   2024 3.4   2024 4.5     INR,(POC) (no units)   Date Value   04/15/2025 2.6   2025 2.9   2025 3.4   2025 2.6   2025 2.9   2024 2.4   12/10/2024 3.9       Hemoglobin   Date Value Ref Range Status   2024 13.0 11.9 - 15.1 g/dL Final     Hematocrit   Date Value Ref Range Status   2024 40.4 36.3 - 47.1 % Final     ALT   Date Value Ref Range Status   2024 31 5 - 33 U/L Final     AST   Date Value Ref Range Status   2024 34 (H) <32 U/L Final       Medication changes:  No change    Notes:    Fingerstick INR drawn per clinic protocol. Patient states no visible blood in urine and no black tarry stool. Denies any missed doses of warfarin. No change in other maintenance medications or in diet. Will recheck INR in 4 weeks. Patient states she will be scheduling a colonoscopy in the fairly near future but no date at this time.  Patient acknowledges working in consult agreement with pharmacist as referred by his/her physician.                  For Pharmacy Admin Tracking Only    Intervention Detail: Adherence Monitorin  Total # of Interventions Recommended: 2  Total # of Interventions Accepted: 2  Time Spent (min): 20      CARLOS ALBERTO HernandezPh., 4/15/2025,12:22 PM

## 2025-04-15 NOTE — PATIENT INSTRUCTIONS
Continue to take warfarin 5mg (1 tablet) Monday and Thursday and 2.5mg (1/2 tablet) all other days.  Continue to monitor urine and stool for signs and symptoms of bleeding.   Please notify the clinic of any medication changes.   Please remember to bring all medications (both prescription and OTC) to your next visit.   Kindly notify the clinic if you are unable to make to your next appointment.   Follow warfarin dosing instructions on dosing calendar provided.

## 2025-04-17 ENCOUNTER — TELEPHONE (OUTPATIENT)
Dept: SURGERY | Age: 70
End: 2025-04-17

## 2025-04-17 DIAGNOSIS — Z12.11 SCREEN FOR COLON CANCER: Primary | ICD-10-CM

## 2025-04-17 NOTE — TELEPHONE ENCOUNTER
Patient is scheduled to have a Colonoscopy procedure scheduled for 5/15/25    Please indicate if patient is cleared for surgery.

## 2025-04-17 NOTE — TELEPHONE ENCOUNTER
Patient is scheduled to have colonoscopy 5/15/25. Please advise holding Coumadin prior to procedure.

## 2025-04-30 DIAGNOSIS — I48.0 PAF (PAROXYSMAL ATRIAL FIBRILLATION) (HCC): ICD-10-CM

## 2025-04-30 DIAGNOSIS — Z95.1 S/P CABG X 2: ICD-10-CM

## 2025-04-30 DIAGNOSIS — E78.2 MIXED HYPERLIPIDEMIA: ICD-10-CM

## 2025-04-30 DIAGNOSIS — I25.10 ASHD (ARTERIOSCLEROTIC HEART DISEASE): ICD-10-CM

## 2025-04-30 RX ORDER — ATORVASTATIN CALCIUM 20 MG/1
TABLET, FILM COATED ORAL
Qty: 90 TABLET | Refills: 3 | Status: SHIPPED | OUTPATIENT
Start: 2025-04-30

## 2025-04-30 RX ORDER — METOPROLOL TARTRATE 50 MG
50 TABLET ORAL 2 TIMES DAILY
Qty: 180 TABLET | Refills: 3 | Status: SHIPPED | OUTPATIENT
Start: 2025-04-30 | End: 2025-05-01

## 2025-05-01 ENCOUNTER — OFFICE VISIT (OUTPATIENT)
Dept: CARDIOLOGY | Age: 70
End: 2025-05-01

## 2025-05-01 VITALS
DIASTOLIC BLOOD PRESSURE: 75 MMHG | HEART RATE: 60 BPM | WEIGHT: 108 LBS | SYSTOLIC BLOOD PRESSURE: 150 MMHG | BODY MASS INDEX: 19.13 KG/M2 | OXYGEN SATURATION: 99 %

## 2025-05-01 DIAGNOSIS — I25.5 ISCHEMIC CARDIOMYOPATHY: ICD-10-CM

## 2025-05-01 DIAGNOSIS — Z95.1 S/P CABG X 2: ICD-10-CM

## 2025-05-01 DIAGNOSIS — Z95.0 CARDIAC PACEMAKER IN SITU: ICD-10-CM

## 2025-05-01 DIAGNOSIS — I48.0 PAF (PAROXYSMAL ATRIAL FIBRILLATION) (HCC): ICD-10-CM

## 2025-05-01 DIAGNOSIS — I49.5 TACHY-BRADY SYNDROME (HCC): ICD-10-CM

## 2025-05-01 DIAGNOSIS — Z98.890 S/P ABLATION OF ATRIAL FIBRILLATION: ICD-10-CM

## 2025-05-01 DIAGNOSIS — Z01.810 PREOP CARDIOVASCULAR EXAM: ICD-10-CM

## 2025-05-01 DIAGNOSIS — Z95.2 S/P AORTIC VALVE AND MITRAL VALVE REPLACEMENT: ICD-10-CM

## 2025-05-01 DIAGNOSIS — R07.89 OTHER CHEST PAIN: ICD-10-CM

## 2025-05-01 DIAGNOSIS — Z86.79 S/P ABLATION OF ATRIAL FIBRILLATION: ICD-10-CM

## 2025-05-01 DIAGNOSIS — E78.2 MIXED HYPERLIPIDEMIA: ICD-10-CM

## 2025-05-01 DIAGNOSIS — I25.10 ASHD (ARTERIOSCLEROTIC HEART DISEASE): Primary | ICD-10-CM

## 2025-05-01 DIAGNOSIS — I47.10 SVT (SUPRAVENTRICULAR TACHYCARDIA): ICD-10-CM

## 2025-05-01 RX ORDER — METOPROLOL SUCCINATE 100 MG/1
150 TABLET, EXTENDED RELEASE ORAL DAILY
Qty: 180 TABLET | Refills: 3 | Status: SHIPPED | OUTPATIENT
Start: 2025-05-01

## 2025-05-01 NOTE — PROGRESS NOTES
guide future management and Because current signs and symptoms can certainly be caused by significant coronary artery disease, I ordered a Regadenoson (Lexiscan) stress test with SPECT imaging to try and rule out this possibility.    Mitral and aortic valve replacement: Chronic anticoagulation, Currently compensated.  No evidence of volume overload.  Anticoagulation: Continue Coumadin Clinic for INR, goal INR is 2.5-3.5.  Beta Blocker: STOP metoprolol tartrate (Lopressor) and START Metoprolol succinate (Toprol XL) 100 mg in morning and 50 mg in afternoon.  ACE Inibitor/ARB: Continue losartan (Cozaar) 50 mg BID.   Repeat echo as above.    Essential Hypertension: Controlled  Beta Blocker: Continue Metoprolol tartrate (Lopressor) 50 mg bid.   ACE Inibitor/ARB: Continue losartan (Cozaar) 50 mg BID.       Hyperlipidemia: Mixed LDL 75 mg/dL on 11/9/23  Cholesterol Reduction Therapy: Continue Atorvastatin (Lipitor) 20 mg daily.     I ordered Lipid panel.     Dual Chamber Pacemaker:  Indication for Device Placement: Sinus pause  Interrogation Findings: Normal pacemaker function. Mostly atrial paced. Occasional runs of SVT.     Pre-Op Clearance: Colonoscopy   Pre-Operative Risk assessment using 2014 ACC/AHA guidelines   Emergent procedure No  Active Cardiac Condition No (decompensated HF, Arrhythmia, MI <3 weeks, severe valve disease)  Risk Level of Procedure Low Risk (endoscopy, superficial skin, breast, ambulatory, or cataract, etc.)  Revised Cardiac Risk Index Risk factors: History of ischemic heart disease  History of heart failure  Mechanical mitral and aortic valve, need for chronic anticoagulation.  Measurement of Exercise Tolerance before Surgery >4 Yes  According to the 2014 ACC/AHA pre-operative risk assessment guidelines Beth Vega is at intermediate risk for major cardiac complications during a low risk procedure and may continue as planned. Specific medication recommendations are listed below. Medications

## 2025-05-02 ENCOUNTER — HOSPITAL ENCOUNTER (OUTPATIENT)
Dept: NUCLEAR MEDICINE | Age: 70
End: 2025-05-02
Payer: MEDICARE

## 2025-05-02 ENCOUNTER — HOSPITAL ENCOUNTER (OUTPATIENT)
Age: 70
End: 2025-05-02
Payer: MEDICARE

## 2025-05-02 ENCOUNTER — HOSPITAL ENCOUNTER (OUTPATIENT)
Age: 70
Discharge: HOME | End: 2025-05-02
Payer: MEDICARE

## 2025-05-02 VITALS
BODY MASS INDEX: 19.14 KG/M2 | DIASTOLIC BLOOD PRESSURE: 75 MMHG | HEIGHT: 63 IN | WEIGHT: 108.03 LBS | SYSTOLIC BLOOD PRESSURE: 150 MMHG

## 2025-05-02 DIAGNOSIS — I49.5 TACHY-BRADY SYNDROME (HCC): ICD-10-CM

## 2025-05-02 DIAGNOSIS — E78.2 MIXED HYPERLIPIDEMIA: ICD-10-CM

## 2025-05-02 DIAGNOSIS — Z95.1 S/P CABG X 2: ICD-10-CM

## 2025-05-02 DIAGNOSIS — Z86.79 S/P ABLATION OF ATRIAL FIBRILLATION: ICD-10-CM

## 2025-05-02 DIAGNOSIS — Z95.0 CARDIAC PACEMAKER IN SITU: ICD-10-CM

## 2025-05-02 DIAGNOSIS — Z95.2 S/P AORTIC VALVE AND MITRAL VALVE REPLACEMENT: ICD-10-CM

## 2025-05-02 DIAGNOSIS — I25.10 ASHD (ARTERIOSCLEROTIC HEART DISEASE): ICD-10-CM

## 2025-05-02 DIAGNOSIS — I48.0 PAF (PAROXYSMAL ATRIAL FIBRILLATION) (HCC): ICD-10-CM

## 2025-05-02 DIAGNOSIS — I25.5 ISCHEMIC CARDIOMYOPATHY: ICD-10-CM

## 2025-05-02 DIAGNOSIS — Z98.890 S/P ABLATION OF ATRIAL FIBRILLATION: ICD-10-CM

## 2025-05-02 LAB
CHOLEST SERPL-MCNC: 189 MG/DL (ref 0–199)
CHOLESTEROL/HDL RATIO: 2
ECHO AO ASC DIAM: 2.1 CM
ECHO AO ASCENDING AORTA INDEX: 1.41 CM/M2
ECHO AV AREA PEAK VELOCITY: 1.3 CM2
ECHO AV AREA VTI: 1.4 CM2
ECHO AV AREA/BSA PEAK VELOCITY: 0.9 CM2/M2
ECHO AV AREA/BSA VTI: 0.9 CM2/M2
ECHO AV MEAN GRADIENT: 6 MMHG
ECHO AV MEAN VELOCITY: 1.1 M/S
ECHO AV PEAK GRADIENT: 11 MMHG
ECHO AV PEAK VELOCITY: 1.7 M/S
ECHO AV VELOCITY RATIO: 0.53
ECHO AV VTI: 35.8 CM
ECHO BSA: 1.48 M2
ECHO BSA: 1.48 M2
ECHO EST RA PRESSURE: 3 MMHG
ECHO LA AREA 2C: 18.1 CM2
ECHO LA AREA 4C: 19 CM2
ECHO LA MAJOR AXIS: 4.9 CM
ECHO LA MINOR AXIS: 4.6 CM
ECHO LA VOL BP: 60 ML (ref 22–52)
ECHO LA VOL MOD A2C: 59 ML (ref 22–52)
ECHO LA VOL MOD A4C: 58 ML (ref 22–52)
ECHO LA VOL/BSA BIPLANE: 40 ML/M2 (ref 16–34)
ECHO LA VOLUME INDEX MOD A2C: 40 ML/M2 (ref 16–34)
ECHO LA VOLUME INDEX MOD A4C: 39 ML/M2 (ref 16–34)
ECHO LV E' LATERAL VELOCITY: 8.7 CM/S
ECHO LV EDV A2C: 90 ML
ECHO LV EDV A4C: 74 ML
ECHO LV EDV INDEX A4C: 50 ML/M2
ECHO LV EDV NDEX A2C: 60 ML/M2
ECHO LV EF PHYSICIAN: 50 %
ECHO LV EJECTION FRACTION A2C: 51 %
ECHO LV EJECTION FRACTION A4C: 53 %
ECHO LV EJECTION FRACTION BIPLANE: 52 % (ref 55–100)
ECHO LV ESV A2C: 44 ML
ECHO LV ESV A4C: 35 ML
ECHO LV ESV INDEX A2C: 30 ML/M2
ECHO LV ESV INDEX A4C: 23 ML/M2
ECHO LV FRACTIONAL SHORTENING: 27 % (ref 28–44)
ECHO LV INTERNAL DIMENSION DIASTOLE INDEX: 2.75 CM/M2
ECHO LV INTERNAL DIMENSION DIASTOLIC: 4.1 CM (ref 3.9–5.3)
ECHO LV INTERNAL DIMENSION SYSTOLIC INDEX: 2.01 CM/M2
ECHO LV INTERNAL DIMENSION SYSTOLIC: 3 CM
ECHO LV IVSD: 0.9 CM (ref 0.6–0.9)
ECHO LV MASS 2D: 114.1 G (ref 67–162)
ECHO LV MASS INDEX 2D: 76.6 G/M2 (ref 43–95)
ECHO LV POSTERIOR WALL DIASTOLIC: 0.9 CM (ref 0.6–0.9)
ECHO LV RELATIVE WALL THICKNESS RATIO: 0.44
ECHO LVOT AREA: 2.3 CM2
ECHO LVOT AV VTI INDEX: 0.61
ECHO LVOT DIAM: 1.7 CM
ECHO LVOT MEAN GRADIENT: 2 MMHG
ECHO LVOT PEAK GRADIENT: 4 MMHG
ECHO LVOT PEAK VELOCITY: 0.9 M/S
ECHO LVOT STROKE VOLUME INDEX: 33.2 ML/M2
ECHO LVOT SV: 49.5 ML
ECHO LVOT VTI: 21.8 CM
ECHO MV A VELOCITY: 0.39 M/S
ECHO MV AREA VTI: 1.1 CM2
ECHO MV E DECELERATION TIME (DT): 174 MS
ECHO MV E VELOCITY: 1.83 M/S
ECHO MV E/A RATIO: 4.69
ECHO MV E/E' LATERAL: 21.03
ECHO MV LVOT VTI INDEX: 2.04
ECHO MV MAX VELOCITY: 2 M/S
ECHO MV MEAN GRADIENT: 5 MMHG
ECHO MV MEAN VELOCITY: 0.9 M/S
ECHO MV PEAK GRADIENT: 16 MMHG
ECHO MV VTI: 44.5 CM
ECHO PULMONARY ARTERY END DIASTOLIC PRESSURE: 1 MMHG
ECHO PV MAX VELOCITY: 0.8 M/S
ECHO PV PEAK GRADIENT: 2 MMHG
ECHO PV REGURGITANT MAX VELOCITY: 0.6 M/S
ECHO RIGHT VENTRICULAR SYSTOLIC PRESSURE (RVSP): 42 MMHG
ECHO RV TAPSE: 1.1 CM (ref 1.7–?)
ECHO RVOT PEAK VELOCITY: 1 M/S
ECHO TV REGURGITANT MAX VELOCITY: 3.13 M/S
ECHO TV REGURGITANT PEAK GRADIENT: 39 MMHG
HDLC SERPL-MCNC: 95 MG/DL
LDLC SERPL CALC-MCNC: 79 MG/DL (ref 0–100)
NUC STRESS EJECTION FRACTION: 49 %
STRESS BASELINE DIAS BP: 80 MMHG
STRESS BASELINE HR: 68 BPM
STRESS BASELINE SYS BP: 152 MMHG
STRESS ESTIMATED WORKLOAD: 1.3 METS
STRESS PEAK DIAS BP: 80 MMHG
STRESS PEAK SYS BP: 152 MMHG
STRESS PERCENT HR ACHIEVED: 46 %
STRESS POST PEAK HR: 70 BPM
STRESS RATE PRESSURE PRODUCT: NORMAL BPM*MMHG
STRESS TARGET HR: 153 BPM
TID: 1
TRIGL SERPL-MCNC: 74 MG/DL
VLDLC SERPL CALC-MCNC: 15 MG/DL (ref 1–30)

## 2025-05-02 PROCEDURE — 93016 CV STRESS TEST SUPVJ ONLY: CPT | Performed by: INTERNAL MEDICINE

## 2025-05-02 PROCEDURE — 93306 TTE W/DOPPLER COMPLETE: CPT | Performed by: INTERNAL MEDICINE

## 2025-05-02 PROCEDURE — 3430000000 HC RX DIAGNOSTIC RADIOPHARMACEUTICAL: Performed by: INTERNAL MEDICINE

## 2025-05-02 PROCEDURE — 80061 LIPID PANEL: CPT

## 2025-05-02 PROCEDURE — 93306 TTE W/DOPPLER COMPLETE: CPT

## 2025-05-02 PROCEDURE — 36415 COLL VENOUS BLD VENIPUNCTURE: CPT

## 2025-05-02 PROCEDURE — 78452 HT MUSCLE IMAGE SPECT MULT: CPT | Performed by: INTERNAL MEDICINE

## 2025-05-02 PROCEDURE — 6360000002 HC RX W HCPCS: Performed by: FAMILY MEDICINE

## 2025-05-02 PROCEDURE — A9500 TC99M SESTAMIBI: HCPCS | Performed by: INTERNAL MEDICINE

## 2025-05-02 PROCEDURE — 93018 CV STRESS TEST I&R ONLY: CPT | Performed by: INTERNAL MEDICINE

## 2025-05-02 PROCEDURE — 93017 CV STRESS TEST TRACING ONLY: CPT

## 2025-05-02 RX ORDER — TETRAKIS(2-METHOXYISOBUTYLISOCYANIDE)COPPER(I) TETRAFLUOROBORATE 1 MG/ML
30 INJECTION, POWDER, LYOPHILIZED, FOR SOLUTION INTRAVENOUS
Status: COMPLETED | OUTPATIENT
Start: 2025-05-02 | End: 2025-05-02

## 2025-05-02 RX ORDER — REGADENOSON 0.08 MG/ML
0.4 INJECTION, SOLUTION INTRAVENOUS
Status: COMPLETED | OUTPATIENT
Start: 2025-05-02 | End: 2025-05-02

## 2025-05-02 RX ORDER — TETRAKIS(2-METHOXYISOBUTYLISOCYANIDE)COPPER(I) TETRAFLUOROBORATE 1 MG/ML
10 INJECTION, POWDER, LYOPHILIZED, FOR SOLUTION INTRAVENOUS
Status: COMPLETED | OUTPATIENT
Start: 2025-05-02 | End: 2025-05-02

## 2025-05-02 RX ADMIN — Medication 30 MILLICURIE: at 11:04

## 2025-05-02 RX ADMIN — REGADENOSON 0.4 MG: 0.08 INJECTION, SOLUTION INTRAVENOUS at 11:44

## 2025-05-02 RX ADMIN — Medication 10 MILLICURIE: at 11:04

## 2025-05-04 ENCOUNTER — RESULTS FOLLOW-UP (OUTPATIENT)
Dept: CARDIOLOGY | Age: 70
End: 2025-05-04

## 2025-05-05 ENCOUNTER — TELEPHONE (OUTPATIENT)
Dept: SURGERY | Age: 70
End: 2025-05-05

## 2025-05-05 NOTE — TELEPHONE ENCOUNTER
Patient contacted office, she reports per her cardiologist she should reschedule colonoscopy procedure in [place] with Jacquelin Ng. Writer advised that procedure location and provider can be changed to [place] but it will push date to 8/22/25 for next available with Dr. Roper. Patient requested to contact Cardiology office to clarify if she should keep scheduled date of 5/15/25 at [place] location with Dr. Olmedo or if it is ok to push date to 8/22/25 in [place]. Writer advised procedure on 5/15/25 will not be cancelled until she returns call to office to advise of clarification and requests date change.

## 2025-05-07 ENCOUNTER — ANTI-COAG VISIT (OUTPATIENT)
Age: 70
End: 2025-05-07
Payer: MEDICARE

## 2025-05-07 VITALS
BODY MASS INDEX: 19.14 KG/M2 | WEIGHT: 108 LBS | DIASTOLIC BLOOD PRESSURE: 77 MMHG | HEART RATE: 69 BPM | SYSTOLIC BLOOD PRESSURE: 168 MMHG

## 2025-05-07 DIAGNOSIS — Z95.2 S/P MVR (MITRAL VALVE REPLACEMENT): Primary | ICD-10-CM

## 2025-05-07 DIAGNOSIS — Z95.2 AORTIC VALVE REPLACED: ICD-10-CM

## 2025-05-07 LAB
INTERNATIONAL NORMALIZATION RATIO, POC: 2.7
PROTHROMBIN TIME, POC: 0

## 2025-05-07 PROCEDURE — 99212 OFFICE O/P EST SF 10 MIN: CPT

## 2025-05-07 PROCEDURE — 85610 PROTHROMBIN TIME: CPT

## 2025-05-07 NOTE — PROGRESS NOTES
06/12/2024 3.6   04/30/2024 2.6     INR,(POC) (no units)   Date Value   05/07/2025 2.7   04/15/2025 2.6   03/17/2025 2.9   03/04/2025 3.4       Hemoglobin (g/dL)   Date Value   05/07/2024 13.0   11/09/2023 13.8   05/18/2023 12.5     Hematocrit (%)   Date Value   05/07/2024 40.4   11/09/2023 41.8   05/18/2023 38.1     Platelets (k/uL)   Date Value   05/07/2024 149   11/09/2023 See Reflexed IPF Result   05/18/2023 See Reflexed IPF Result     Labs from patient's PCP office faxed to clinic ([facility])  4/14/25         Platelets 141     Lovenox Bridge Protocol      Patient to hold warfarin 5 days prior to surgery.  Last warfarin dose on 5/9/25.    Start Lovenox 1.5mg/kg daily (wt 49 kg) 36 hours after warfarin discontinuation on 5/11/25 am  `  Stop Lovenox 24 hours before your procedure. Last Lovenox dose on  5/14/25  am    Restart warfarin the evening of your procedure on 5/15/25 if approved per surgeon.    Restart Lovenox 24 hours after your procedure on 5/16/25 am    Return to warfarin clinic for INR drawn on 5/22/25.    Lovenox 80 mg subcutaneously every 24 hours # 10 was sent to   [facility] on (date) 5/8/25.  Thank you!    Annika Hernandez [facility]    Anticoagulation diagnosis:  mechanical aortic and mitral valves; atrial fibrillation    [facility] Medication Management         Fax# [phone]  Phone# [phone]      Annika Hernandez [facility]  Medication Management  ANTICOAGULATION    Referring Provider: Dr. Velazquez     GOAL INR: 2.5 - 3.5     TODAY'S INR: 2.7     WARFARIN Dosage: Continue warfarin 5 mg Monday,Thursday and half tablet for 2.5 mg all other days.    INR (no units)   Date Value   03/09/2025 2.4   07/23/2024 2.5   06/12/2024 3.6   04/30/2024 2.6   03/19/2024 2.4   02/23/2024 3.4   02/08/2024 4.5     INR,(POC) (no units)   Date Value   04/15/2025 2.6   03/17/2025 2.9   03/04/2025 3.4   02/04/2025 2.6

## 2025-05-08 ENCOUNTER — TELEPHONE (OUTPATIENT)
Dept: SURGERY | Age: 70
End: 2025-05-08

## 2025-05-08 DIAGNOSIS — Z12.11 SCREEN FOR COLON CANCER: Primary | ICD-10-CM

## 2025-05-08 DIAGNOSIS — Z12.11 COLON CANCER SCREENING: ICD-10-CM

## 2025-05-08 RX ORDER — ENOXAPARIN SODIUM 100 MG/ML
80 INJECTION SUBCUTANEOUS DAILY
Qty: 10 EACH | Refills: 0 | Status: SHIPPED | OUTPATIENT
Start: 2025-05-11

## 2025-05-08 RX ORDER — ENOXAPARIN SODIUM 100 MG/ML
80 INJECTION SUBCUTANEOUS DAILY
COMMUNITY
Start: 2025-05-11 | End: 2025-05-08 | Stop reason: SDUPTHER

## 2025-05-08 NOTE — TELEPHONE ENCOUNTER
5/8/2025    I spoke to Dr. Velazquez directly. Patient prefers to have colonoscopy in [place] rather than [place] next week and with her cardiac history and planned bridging while off coumadin, Dr. Velazquez wants her to be in the area to watch her numbers and be available if needed. She is ok for a colonoscopy per cardiology. I spke with  Madelyn in office she will call patient and send paperwork to scheduling. I will meet with patient in preop day of procedure.    Dr Interiano

## 2025-05-09 ENCOUNTER — ANESTHESIA EVENT (OUTPATIENT)
Dept: OPERATING ROOM | Age: 70
End: 2025-05-09
Payer: MEDICARE

## 2025-05-12 NOTE — PROGRESS NOTES
Patient states that received their colon prep instructions and home medications that are to be taken on the day of their procedure with a small sip of water only (metoprolol, oxybutynin, omeprazole) from the physician's office. NPO status (including no gum, hard candy, mints, water, coffee, or smoking) was reviewed and patient verbalizes understanding.

## 2025-05-15 ENCOUNTER — HOSPITAL ENCOUNTER (OUTPATIENT)
Age: 70
Setting detail: OUTPATIENT SURGERY
Discharge: HOME | End: 2025-05-15
Attending: SURGERY | Admitting: SURGERY
Payer: MEDICARE

## 2025-05-15 ENCOUNTER — ANESTHESIA (OUTPATIENT)
Dept: OPERATING ROOM | Age: 70
End: 2025-05-15
Payer: MEDICARE

## 2025-05-15 VITALS
DIASTOLIC BLOOD PRESSURE: 53 MMHG | RESPIRATION RATE: 16 BRPM | TEMPERATURE: 97.6 F | OXYGEN SATURATION: 97 % | HEART RATE: 60 BPM | WEIGHT: 100.4 LBS | HEIGHT: 63 IN | SYSTOLIC BLOOD PRESSURE: 143 MMHG | BODY MASS INDEX: 17.79 KG/M2

## 2025-05-15 PROCEDURE — 3609027000 HC COLONOSCOPY: Performed by: SURGERY

## 2025-05-15 PROCEDURE — 2580000003 HC RX 258

## 2025-05-15 PROCEDURE — G0121 COLON CA SCRN NOT HI RSK IND: HCPCS | Performed by: SURGERY

## 2025-05-15 PROCEDURE — 6360000002 HC RX W HCPCS

## 2025-05-15 PROCEDURE — 7100000011 HC PHASE II RECOVERY - ADDTL 15 MIN: Performed by: SURGERY

## 2025-05-15 PROCEDURE — 2500000003 HC RX 250 WO HCPCS

## 2025-05-15 PROCEDURE — 3700000001 HC ADD 15 MINUTES (ANESTHESIA): Performed by: SURGERY

## 2025-05-15 PROCEDURE — 7100000010 HC PHASE II RECOVERY - FIRST 15 MIN: Performed by: SURGERY

## 2025-05-15 PROCEDURE — 3700000000 HC ANESTHESIA ATTENDED CARE: Performed by: SURGERY

## 2025-05-15 PROCEDURE — 2709999900 HC NON-CHARGEABLE SUPPLY: Performed by: SURGERY

## 2025-05-15 RX ORDER — SODIUM CHLORIDE 0.9 % (FLUSH) 0.9 %
5-40 SYRINGE (ML) INJECTION EVERY 12 HOURS SCHEDULED
Status: DISCONTINUED | OUTPATIENT
Start: 2025-05-15 | End: 2025-05-15 | Stop reason: HOSPADM

## 2025-05-15 RX ORDER — DEXMEDETOMIDINE HYDROCHLORIDE 4 UG/ML
INJECTION, SOLUTION INTRAVENOUS
Status: DISCONTINUED | OUTPATIENT
Start: 2025-05-15 | End: 2025-05-15 | Stop reason: SDUPTHER

## 2025-05-15 RX ORDER — SODIUM CHLORIDE 0.9 % (FLUSH) 0.9 %
5-40 SYRINGE (ML) INJECTION PRN
Status: DISCONTINUED | OUTPATIENT
Start: 2025-05-15 | End: 2025-05-15 | Stop reason: HOSPADM

## 2025-05-15 RX ORDER — SODIUM CHLORIDE 9 MG/ML
INJECTION, SOLUTION INTRAVENOUS PRN
Status: DISCONTINUED | OUTPATIENT
Start: 2025-05-15 | End: 2025-05-15 | Stop reason: HOSPADM

## 2025-05-15 RX ORDER — PROPOFOL 10 MG/ML
INJECTION, EMULSION INTRAVENOUS
Status: DISCONTINUED | OUTPATIENT
Start: 2025-05-15 | End: 2025-05-15 | Stop reason: SDUPTHER

## 2025-05-15 RX ORDER — NALOXONE HYDROCHLORIDE 0.4 MG/ML
INJECTION, SOLUTION INTRAMUSCULAR; INTRAVENOUS; SUBCUTANEOUS PRN
Status: DISCONTINUED | OUTPATIENT
Start: 2025-05-15 | End: 2025-05-15 | Stop reason: HOSPADM

## 2025-05-15 RX ORDER — LIDOCAINE HYDROCHLORIDE 20 MG/ML
INJECTION, SOLUTION EPIDURAL; INFILTRATION; INTRACAUDAL; PERINEURAL
Status: DISCONTINUED | OUTPATIENT
Start: 2025-05-15 | End: 2025-05-15 | Stop reason: SDUPTHER

## 2025-05-15 RX ORDER — SODIUM CHLORIDE, SODIUM LACTATE, POTASSIUM CHLORIDE, CALCIUM CHLORIDE 600; 310; 30; 20 MG/100ML; MG/100ML; MG/100ML; MG/100ML
INJECTION, SOLUTION INTRAVENOUS CONTINUOUS
Status: DISCONTINUED | OUTPATIENT
Start: 2025-05-15 | End: 2025-05-15 | Stop reason: HOSPADM

## 2025-05-15 RX ORDER — ONDANSETRON 2 MG/ML
4 INJECTION INTRAMUSCULAR; INTRAVENOUS
Status: DISCONTINUED | OUTPATIENT
Start: 2025-05-15 | End: 2025-05-15 | Stop reason: HOSPADM

## 2025-05-15 RX ADMIN — Medication 0.15 MG: at 15:07

## 2025-05-15 RX ADMIN — PROPOFOL 75 MCG/KG/MIN: 10 INJECTION, EMULSION INTRAVENOUS at 14:56

## 2025-05-15 RX ADMIN — SODIUM CHLORIDE, POTASSIUM CHLORIDE, SODIUM LACTATE AND CALCIUM CHLORIDE: 600; 310; 30; 20 INJECTION, SOLUTION INTRAVENOUS at 15:17

## 2025-05-15 RX ADMIN — Medication 0.1 MG: at 14:59

## 2025-05-15 RX ADMIN — SODIUM CHLORIDE, POTASSIUM CHLORIDE, SODIUM LACTATE AND CALCIUM CHLORIDE: 600; 310; 30; 20 INJECTION, SOLUTION INTRAVENOUS at 13:34

## 2025-05-15 RX ADMIN — LIDOCAINE HYDROCHLORIDE 100 MG: 20 INJECTION, SOLUTION EPIDURAL; INFILTRATION; INTRACAUDAL; PERINEURAL at 14:52

## 2025-05-15 RX ADMIN — Medication 0.1 MG: at 15:12

## 2025-05-15 RX ADMIN — PROPOFOL 20 MG: 10 INJECTION, EMULSION INTRAVENOUS at 15:23

## 2025-05-15 RX ADMIN — PROPOFOL 50 MG: 10 INJECTION, EMULSION INTRAVENOUS at 14:52

## 2025-05-15 RX ADMIN — DEXMEDETOMIDINE HYDROCHLORIDE IN 0.9% SODIUM CHLORIDE 4 MCG: 4 INJECTION INTRAVENOUS at 14:42

## 2025-05-15 RX ADMIN — Medication 0.1 MG: at 15:03

## 2025-05-15 ASSESSMENT — PAIN - FUNCTIONAL ASSESSMENT
PAIN_FUNCTIONAL_ASSESSMENT: FACE, LEGS, ACTIVITY, CRY, AND CONSOLABILITY (FLACC)
PAIN_FUNCTIONAL_ASSESSMENT: 0-10
PAIN_FUNCTIONAL_ASSESSMENT: FACE, LEGS, ACTIVITY, CRY, AND CONSOLABILITY (FLACC)
PAIN_FUNCTIONAL_ASSESSMENT: 0-10

## 2025-05-15 ASSESSMENT — ENCOUNTER SYMPTOMS: SHORTNESS OF BREATH: 0

## 2025-05-15 NOTE — PROGRESS NOTES
Ger CRNA notified of pt's run of SVT on monitor.  Pt states she can feel it and this is a normal occurrence for her.  Ger states they knew about this patient and her history and that she was cleared by cardiology.  Okay to proceed with colonoscopy as scheduled.

## 2025-05-15 NOTE — DISCHARGE INSTRUCTIONS
DISCHARGE INSTRUCTIONS for COLONOSCOPY    1.  Do not drive or operate machinery for 24 hours.    2.  Do not make important personal or business decisions for 24 hours.    3.  Do not drink alcoholic beverages for 24 hours.    4.  Do not smoke tobacco products for 24 hours.        Blood thinners can be restarted today since no biopsies were taken            COLONOSCOPY DISCHARGE INSTRUCTIONS:    It's normal to have a feeling of fullness or mild cramping in your abdomen afterwards due to air that is put into your bowel during the procedure.  Mild activities such as walking will help you pass the air.    You may resume your regular diet.    CALL THE DOCTOR IF YOU HAVE:     Chest pain or trouble breathing.    Persistent nd significant bleeding from your rectum such as soaking through clothing or feeling very dizzy or sweating    A fever above 101F or if you have chills    If symptoms are to severe call 911 or go to the nearest Emergency Room.

## 2025-05-15 NOTE — ANESTHESIA PRE PROCEDURE
Department of Anesthesiology  Preprocedure Note       Name:  Beth Vega   Age:  69 y.o.  :  1955                                          MRN:  [id number]         Date:  5/15/2025      Surgeon: Surgeon(s):  Katherine Roper DO    Procedure: Procedure(s):  COLORECTAL CANCER SCREENING, NOT HIGH RISK    Medications prior to admission:   Prior to Admission medications    Medication Sig Start Date End Date Taking? Authorizing Provider   enoxaparin (LOVENOX) 80 MG/0.8ML Inject 0.8 mLs into the skin daily Lovenox bridge as instructed prior to and after colonoscopy on 5/15/25 5/11/25  Yes Priscilla Velazquez MD   metoprolol succinate (TOPROL XL) 100 MG extended release tablet Take 1.5 tablets by mouth daily Take 100 mg am and 50 mg in afternoon 25  Yes Priscilla Velazquez MD   atorvastatin (LIPITOR) 20 MG tablet Take 1 tablet by mouth once daily 25  Yes Kelsy Mckeon, APRN - CNP   losartan (COZAAR) 50 MG tablet TAKE 1 TABLET BY MOUTH TWICE DAILY IN  THE  MORNING  AND  AT  BEDTIME 24  Yes Kelsi Cormier PA-C   aspirin-acetaminophen-caffeine (EXCEDRIN MIGRAINE) 250-250-65 MG per tablet Take 1 tablet by mouth daily as needed for Headaches   Yes Arlette Butt MD   polyethylene glycol (GLYCOLAX) 17 GM/SCOOP powder Take 17 g by mouth daily as needed   Yes Arlette Butt MD   omeprazole (PRILOSEC) 20 MG capsule Take 1 capsule by mouth Daily 8/25/15  Yes Kushal Rubi MD   warfarin (COUMADIN) 5 MG tablet TAKE 1/2 TABLET BY MOUTH EVERY TUESDAY, THURSDAY AND SATURDAY THEN 1 TABLET EVERY , MONDAY, WEDNESDAY AND 24   Kelsi Cormier PA-C   Magnesium Oxide (MAGNESIUM-OXIDE) 250 MG TABS tablet Take 1 tablet by mouth daily    Arlette Butt MD   Biotin 73069 MCG TABS Take 1 tablet by mouth daily    Arlette Butt MD   oxybutynin (DITROPAN-XL) 10 MG extended release tablet Take 1 tablet by mouth daily Uses PRN    Arlette Butt MD   Multiple

## 2025-05-15 NOTE — ANESTHESIA POSTPROCEDURE EVALUATION
Department of Anesthesiology  Postprocedure Note    Patient: Beth Vega  MRN: [id number]  YOB: 1955  Date of evaluation: 5/15/2025    Procedure Summary       Date: 05/15/25 Room / Location: [place] [id number] / [facility]    Anesthesia Start: 1444 Anesthesia Stop: 1543    Procedure: COLORECTAL CANCER SCREENING, NOT HIGH RISK (Abdomen) Diagnosis:       Colon cancer screening      (Colon cancer screening [Z12.11])    Surgeons: Katherine Roper DO Responsible Provider: Yudith Austin APRN - CRNA    Anesthesia Type: general, TIVA ASA Status: 4            Anesthesia Type: No value filed.    Carter Phase I: Carter Score: 10    Carter Phase II: Carter Score: 10    Anesthesia Post Evaluation    Patient location during evaluation: PACU  Patient participation: complete - patient participated  Level of consciousness: awake and alert  Pain score: 0  Airway patency: patent  Nausea & Vomiting: no nausea and no vomiting  Cardiovascular status: blood pressure returned to baseline and hemodynamically stable  Respiratory status: acceptable  Hydration status: euvolemic  Multimodal analgesia pain management approach  Pain management: adequate    No notable events documented.

## 2025-05-15 NOTE — OP NOTE
Operative Note      Patient: Beth Vega  YOB: 1955  MRN: [id number]  Laura Ng APRN - CNP    Date of Procedure: 5/15/2025    Pre-Op Diagnosis Codes:      * Colon cancer screening [Z12.11]    Post-Op Diagnosis: normal healthy colon       Procedure: Colonoscopy to cecum

## 2025-05-15 NOTE — PROGRESS NOTES
Discharge Criteria    Inpatients must meet Criteria 1 through 7. All other patients are either YES or N/A. If a NO is chosen then Anesthesia or Surgeon must be notified.      1.  Minimum 30 minutes after last dose of sedative medication.    Yes      2.  Systolic BP between 90 - 160. Diastolic BP between 60 - 90.    No- anesthesia OK with BP       3.  Pulse between 60 - 120    Yes      4.  Respirations between 8 - 25.    Yes      5.  SpO2 92% - 100%.    Yes      6.  Able to cough and swallow or return to baseline function.    Yes      7.  Alert and oriented or return to baseline mental status.    Yes      8.  Demonstrates controlled, coordinated movements, ambulates with steady gait, or return to baseline activity function.    Yes      9.  Minimal or no pain or nausea, or at a level tolerable and acceptable to patient.    Yes      10. Takes and retains oral fluids as allowed.    Yes      11. Procedural / perioperative site stable.  Minimal or no bleeding.    Yes          12. If GI endoscopy procedure, minimal or no abdominal distention or passing flatus.    Yes      13. Written discharge instructions and emergency telephone number provided.    Yes      14. Accompanied by a responsible adult.    Yes

## 2025-05-15 NOTE — H&P
GENERAL SURGERY CONSULTATION         Signed         HPI Beth Vega is a 69 y.o. old female who has a past medical history of hypertension, A fib, coronary artery disease, CHF, mitral valve replacement, tricuspid valve repair s/p rheumatic fever, ischemic colitis, initially presented as a new GI referral with concern for abnormal CT scan and elevated liver enzymes.     According to Beth, she had a CT scan of her abdomen and pelvis after experiencing what she describes as \"just a twinge\" sensation that she noted in her right upper abdomen.  She is status post cholecystectomy (1997).  Further evaluation noted an elevation in her AST and ALT, alkaline phosphate within normal limits.  Platelets currently at 137, reviewed EMR notes that in 2021 platelets were 125.  Beth describe herself as a nonalcohol drinker with her last alcoholic drink being in early 80s.  Her mother has a history of liver cancer that brother reports was metastasized from breast cancer.     Beth denies any jaundice, darkening of her urine.  No abdominal bloating.d Hx of colon polyps.   No Rectal bleeding or melena,  Denies unexplained weight loss, fever, or other systemic symptoms. No First-degree relative with  colorectal cancer. Not on anticoagulants or antiaggregant therapy.     Interval history 9/17/24:  Follow-up OV to discuss elastography results.  Today, Beth endorses that she is feeling well, doing well with no concerns.  She denies abdominal pain, nausea or vomiting, and or jaundice.  She endorses that she had a recent ER visit for a bleeding hemorrhoid, notes that she is on warfarin and has had no recurrent bleed since.  Last colonoscopy 2010.   Interval history 5/12/24:  GI follow up.  Discussed labs; will repeat LUIS DANIEL due to equivocal results.  She was unable to have a CT or MRCP completed due to pacemaker.  Today, she reports that she feels well, doing well with no concerns.                                        Jackie  (HCC), Congestive heart failure (CHF) (HCC), Depression, H/O mitral valve replacement, H/O tricuspid valve repair, Hypertension, Ischemic colitis, Migraine, Migraine, Migraine, PAF (paroxysmal atrial fibrillation) (HCC), Rheumatic fever, S/P AVR, and SOB (shortness of breath).    Past Surgical History:   Past Surgical History:   Procedure Laterality Date    AORTIC VALVE REPLACEMENT  2008    CARDIAC CATHETERIZATION  2007    CARDIAC VALVE REPLACEMENT  2008    aortic and mitral    CHOLECYSTECTOMY  1997    COLONOSCOPY  2010    MITRAL VALVE REPLACEMENT  2008    OVARIAN CYST REMOVAL Right 1977    PACEMAKER INSERTION  05/19/2023    PRE-MALIGNANT / BENIGN SKIN LESION EXCISION Left 05/02/2013    face    TONSILLECTOMY AND ADENOIDECTOMY      TRANSESOPHAGEAL ECHOCARDIOGRAM  05/16/2017    TUBAL LIGATION  1977         Current Meds:  Current Facility-Administered Medications:     lactated ringers infusion, , IntraVENous, Continuous, Yudith Austin APRN - CRNA, Last Rate: 100 mL/hr at 05/15/25 1334, New Bag at 05/15/25 1334    Physical Exam:  Vital signs and Nurse's note reviewed. /76   Pulse 70   Temp 97.2 °F (36.2 °C) (Temporal)   Resp (!) 9   Ht 1.6 m (5' 3\")   Wt 45.5 kg (100 lb 6.4 oz)   SpO2 99%   BMI 17.79 kg/m²    height is 1.6 m (5' 3\") and weight is 45.5 kg (100 lb 6.4 oz). Her temporal temperature is 97.2 °F (36.2 °C). Her blood pressure is 119/76 and her pulse is 70. Her respiration is 9 (abnormal) and oxygen saturation is 99%.   Gen:  A&Ox3, NAD. Pleasant and cooperative.  HEENT: PERRLA, EOMI, no scleral icterus  Neck:  no goiter  CVS: Regular rate and rhythm  Resp: Good bilateral air entry, no active wheezing, no labored breathing  Abd: soft, non-tender, non-distended, bowel sounds present   Ext: Moves all extremities, no gross focal motor deficits  Skin: No erythema or ulcerations     Labs:   Lab Results   Component Value Date/Time    WBC 4.9 05/07/2024 11:59 AM    HGB 13.0 05/07/2024 11:59 AM    HCT

## 2025-05-17 PROBLEM — Z12.11 SCREEN FOR COLON CANCER: Status: RESOLVED | Noted: 2025-04-17 | Resolved: 2025-05-17

## 2025-05-22 ENCOUNTER — ANTI-COAG VISIT (OUTPATIENT)
Age: 70
End: 2025-05-22
Payer: MEDICARE

## 2025-05-22 ENCOUNTER — APPOINTMENT (OUTPATIENT)
Age: 70
End: 2025-05-22
Payer: MEDICARE

## 2025-05-22 VITALS
WEIGHT: 107 LBS | DIASTOLIC BLOOD PRESSURE: 79 MMHG | HEART RATE: 64 BPM | BODY MASS INDEX: 18.95 KG/M2 | SYSTOLIC BLOOD PRESSURE: 148 MMHG

## 2025-05-22 DIAGNOSIS — Z95.2 AORTIC VALVE REPLACED: ICD-10-CM

## 2025-05-22 DIAGNOSIS — Z95.2 S/P MVR (MITRAL VALVE REPLACEMENT): Primary | ICD-10-CM

## 2025-05-22 LAB
INTERNATIONAL NORMALIZATION RATIO, POC: 2.1
PROTHROMBIN TIME, POC: 0

## 2025-05-22 PROCEDURE — 99211 OFF/OP EST MAY X REQ PHY/QHP: CPT

## 2025-05-22 PROCEDURE — 85610 PROTHROMBIN TIME: CPT

## 2025-05-22 NOTE — PROGRESS NOTES
[facility]  Medication Management  ANTICOAGULATION    Referring Provider: Dr. Velazquez     GOAL INR: 2.5 - 3.5     TODAY'S INR: 2.1     WARFARIN Dosage: Take warfarin 1.5 tablet for 7.5 mg today then continue warfarin 5 mg Monday,Thursday and half tablet for 2.5 mg all other days.    INR (no units)   Date Value   2025 2.4   2024 2.5   2024 3.6   2024 2.6   2024 2.4   2024 3.4   2024 4.5     INR,(POC) (no units)   Date Value   2025 2.7   04/15/2025 2.6   2025 2.9   2025 3.4   2025 2.6   2025 2.9   2024 2.4       Hemoglobin   Date Value Ref Range Status   2024 13.0 11.9 - 15.1 g/dL Final     Hematocrit   Date Value Ref Range Status   2024 40.4 36.3 - 47.1 % Final     ALT   Date Value Ref Range Status   2024 31 5 - 33 U/L Final     AST   Date Value Ref Range Status   2024 34 (H) <32 U/L Final       Medication changes:  Stop Lovenox    Notes:    Fingerstick INR drawn per clinic protocol. Patient states no visible blood in urine and no black tarry stool. Denies any missed doses of warfarin. No change in other maintenance medications or in diet. Will recheck INR in 3 weeks. Patient acknowledges working in consult agreement with pharmacist as referred by his/her physician.          Patient held warfarin 5 days prior to colonoscopy on 5/15/25 and bridged with Lovenox 1.5 mg/kg SUBQ every 24 hours.             For Pharmacy Admin Tracking Only    Intervention Detail: Adherence Monitorin, Dose Adjustment: 1, reason: Therapy Optimization, and New Rx: 1, reason: Needs Additional Therapy  Total # of Interventions Recommended: 3  Total # of Interventions Accepted: 3  Time Spent (min): 20      Ofelia Sterling RPH, PharmD

## 2025-06-05 ENCOUNTER — TRANSCRIBE ORDERS (OUTPATIENT)
Dept: ADMINISTRATIVE | Age: 70
End: 2025-06-05

## 2025-06-05 DIAGNOSIS — Z12.31 ENCOUNTER FOR SCREENING MAMMOGRAM FOR MALIGNANT NEOPLASM OF BREAST: Primary | ICD-10-CM

## 2025-06-07 PROBLEM — Z12.11 COLON CANCER SCREENING: Status: RESOLVED | Noted: 2025-05-08 | Resolved: 2025-06-07

## 2025-06-10 ENCOUNTER — OFFICE VISIT (OUTPATIENT)
Dept: CARDIOLOGY | Age: 70
End: 2025-06-10

## 2025-06-10 ENCOUNTER — ANTI-COAG VISIT (OUTPATIENT)
Age: 70
End: 2025-06-10
Payer: MEDICARE

## 2025-06-10 VITALS
SYSTOLIC BLOOD PRESSURE: 159 MMHG | WEIGHT: 105 LBS | OXYGEN SATURATION: 98 % | HEIGHT: 63 IN | RESPIRATION RATE: 18 BRPM | HEART RATE: 82 BPM | BODY MASS INDEX: 18.61 KG/M2 | DIASTOLIC BLOOD PRESSURE: 62 MMHG

## 2025-06-10 VITALS — BODY MASS INDEX: 18.6 KG/M2 | WEIGHT: 105 LBS

## 2025-06-10 DIAGNOSIS — Z95.0 CARDIAC PACEMAKER IN SITU: ICD-10-CM

## 2025-06-10 DIAGNOSIS — I45.5 SINUS PAUSE: ICD-10-CM

## 2025-06-10 DIAGNOSIS — I49.5 TACHY-BRADY SYNDROME (HCC): ICD-10-CM

## 2025-06-10 DIAGNOSIS — Z95.2 S/P AORTIC VALVE AND MITRAL VALVE REPLACEMENT: ICD-10-CM

## 2025-06-10 DIAGNOSIS — I48.0 PAF (PAROXYSMAL ATRIAL FIBRILLATION) (HCC): Primary | ICD-10-CM

## 2025-06-10 DIAGNOSIS — Z86.79 S/P ABLATION OF ATRIAL FIBRILLATION: ICD-10-CM

## 2025-06-10 DIAGNOSIS — Z95.2 S/P MVR (MITRAL VALVE REPLACEMENT): Primary | ICD-10-CM

## 2025-06-10 DIAGNOSIS — Z95.2 AORTIC VALVE REPLACED: ICD-10-CM

## 2025-06-10 DIAGNOSIS — Z95.1 S/P CABG X 2: ICD-10-CM

## 2025-06-10 DIAGNOSIS — Z98.890 S/P ABLATION OF ATRIAL FIBRILLATION: ICD-10-CM

## 2025-06-10 DIAGNOSIS — I25.10 ASHD (ARTERIOSCLEROTIC HEART DISEASE): ICD-10-CM

## 2025-06-10 DIAGNOSIS — I10 ESSENTIAL HYPERTENSION: ICD-10-CM

## 2025-06-10 DIAGNOSIS — E78.2 MIXED HYPERLIPIDEMIA: ICD-10-CM

## 2025-06-10 LAB
INTERNATIONAL NORMALIZATION RATIO, POC: 2.3
PROTHROMBIN TIME, POC: 0

## 2025-06-10 PROCEDURE — 85610 PROTHROMBIN TIME: CPT | Performed by: COUNSELOR

## 2025-06-10 PROCEDURE — 99212 OFFICE O/P EST SF 10 MIN: CPT | Performed by: COUNSELOR

## 2025-06-10 NOTE — PROGRESS NOTES
EDT  Click here for a link to the UpToDate guideline \"Atrial Fibrillation: Anticoagulation therapy to prevent embolization  Disclaimer: Risk Score calculation is dependent on accuracy of patient problem list and past encounter diagnosis.   Anticoagulation: warfarin (Coumadin) take as directed (goal INR 2.5-3.5)     Atherosclerotic Heart Disease: S/P CABG x2 SVG to LAD and SVG to OM1 in 2008.  Patient is a stable, atypical chest pain  that did not change in intensity or frequency or duration since last visit.  Episodes usually last for less than a minute.  She had a stress test done back in June 2023 for the same symptoms and it showed normal myocardial perfusion.  I reviewed the most recent stress test in great details with her.  She does have fixed defect there is no significant ischemia.  She is also currently asymptomatic.  No further ischemic workup indicated.  Antiplatelet Agent: Continue aspirin 162 mg daily.   Beta Blocker: Continue Metoprolol succinate (Toprol XL) 100 mg in morning and 50 mg in afternoon.  Cholesterol Reduction Therapy: Continue Atorvastatin (Lipitor) 20 mg daily.    Additional counseling: I advised them to call our office or go to the emergency room if they developed worsening or persistent chest pain or increased shortness of breath as this could be life threatening.   Additional Testing List: None    Mitral and aortic valve replacement: Chronic anticoagulation, Currently compensated.  No evidence of volume overload.  Anticoagulation: Continue Coumadin Clinic for INR, goal INR is 2.5-3.5.  Beta Blocker: Continue Metoprolol succinate (Toprol XL) 100 mg in morning and 50 mg in afternoon.  ACE Inibitor/ARB: Continue losartan (Cozaar) 50 mg BID.     Essential Hypertension: Controlled  Beta Blocker: Continue Metoprolol succinate (Toprol XL) 100 mg in the morning and 50 mg in the afternoon.  ACE Inibitor/ARB: Continue losartan (Cozaar) 50 mg BID.       Hyperlipidemia: Mixed LDL 79 mg/dL on  Head atraumatic, normal cephalic shape.

## 2025-06-10 NOTE — PROGRESS NOTES
[facility]  Medication Management  ANTICOAGULATION      Referring Provider: Dr. Velazquez     GOAL INR: 2.5 - 3.5     TODAY'S INR: 2.3     WARFARIN Dosage: Take warfarin 1.5 tablet for 7.5 mg today,  then increase warfarin to 5 mg Monday, Wednesday, Friday and half tablet for 2.5 mg all other days.      Hemoglobin   Date Value Ref Range Status   2024 13.0 11.9 - 15.1 g/dL Final     Hematocrit   Date Value Ref Range Status   2024 40.4 36.3 - 47.1 % Final     ALT   Date Value Ref Range Status   2024 31 5 - 33 U/L Final     AST   Date Value Ref Range Status   2024 34 (H) <32 U/L Final       Medication changes:  No changes    Notes:    Fingerstick INR drawn per clinic protocol. Patient states no visible blood in urine and no black tarry stool. Denies any missed doses of warfarin. No change in other maintenance medications or in diet. Will recheck INR in 2 weeks. Patient acknowledges working in consult agreement with pharmacist as referred by his/her physician.     Patient reports she is starting her summer diet with more greens. Will increase warfarin as above.                For Pharmacy Admin Tracking Only    Intervention Detail: Adherence Monitorin and Dose Adjustment: 2, reason: Improve Adherence, Therapy Optimization  Total # of Interventions Recommended: 3  Total # of Interventions Accepted: 3  Time Spent (min): 20      Annika Hernandez RPH

## 2025-06-24 ENCOUNTER — APPOINTMENT (OUTPATIENT)
Age: 70
End: 2025-06-24
Payer: MEDICARE

## 2025-06-24 ENCOUNTER — HOSPITAL ENCOUNTER (EMERGENCY)
Age: 70
Discharge: HOME | End: 2025-06-24
Payer: MEDICARE

## 2025-06-24 VITALS
DIASTOLIC BLOOD PRESSURE: 69 MMHG | SYSTOLIC BLOOD PRESSURE: 137 MMHG | TEMPERATURE: 98 F | RESPIRATION RATE: 16 BRPM | HEART RATE: 83 BPM | OXYGEN SATURATION: 97 %

## 2025-06-24 DIAGNOSIS — R31.9 HEMATURIA, UNSPECIFIED TYPE: Primary | ICD-10-CM

## 2025-06-24 LAB
ANION GAP SERPL CALCULATED.3IONS-SCNC: 11 MMOL/L (ref 9–16)
BACTERIA URNS QL MICRO: ABNORMAL
BASOPHILS # BLD: 0.04 K/UL (ref 0–0.2)
BASOPHILS NFR BLD: 1 % (ref 0–2)
BILIRUB UR QL STRIP: NEGATIVE
BUN SERPL-MCNC: 15 MG/DL (ref 8–23)
BUN/CREAT SERPL: 19 (ref 9–20)
CALCIUM SERPL-MCNC: 9.5 MG/DL (ref 8.6–10.4)
CHLORIDE SERPL-SCNC: 104 MMOL/L (ref 98–107)
CLARITY UR: CLEAR
CO2 SERPL-SCNC: 24 MMOL/L (ref 20–31)
COLOR UR: YELLOW
CREAT SERPL-MCNC: 0.8 MG/DL (ref 0.5–0.9)
EOSINOPHIL # BLD: 0.1 K/UL (ref 0–0.44)
EOSINOPHILS RELATIVE PERCENT: 2 % (ref 1–4)
EPI CELLS #/AREA URNS HPF: ABNORMAL /HPF (ref 0–25)
ERYTHROCYTE [DISTWIDTH] IN BLOOD BY AUTOMATED COUNT: 12.7 % (ref 11.8–14.4)
GFR, ESTIMATED: 76 ML/MIN/1.73M2
GLUCOSE SERPL-MCNC: 103 MG/DL (ref 74–99)
GLUCOSE UR STRIP-MCNC: NEGATIVE MG/DL
HCT VFR BLD AUTO: 39.3 % (ref 36.3–47.1)
HGB BLD-MCNC: 12.9 G/DL (ref 11.9–15.1)
HGB UR QL STRIP.AUTO: ABNORMAL
IMM GRANULOCYTES # BLD AUTO: <0.03 K/UL (ref 0–0.3)
IMM GRANULOCYTES NFR BLD: 0 %
INR PPP: 2.2
KETONES UR STRIP-MCNC: NEGATIVE MG/DL
LEUKOCYTE ESTERASE UR QL STRIP: NEGATIVE
LYMPHOCYTES NFR BLD: 0.88 K/UL (ref 1.1–3.7)
LYMPHOCYTES RELATIVE PERCENT: 16 % (ref 24–43)
MCH RBC QN AUTO: 31.9 PG (ref 25.2–33.5)
MCHC RBC AUTO-ENTMCNC: 32.8 G/DL (ref 28.4–34.8)
MCV RBC AUTO: 97 FL (ref 82.6–102.9)
MONOCYTES NFR BLD: 0.4 K/UL (ref 0.1–1.2)
MONOCYTES NFR BLD: 7 % (ref 3–12)
NEUTROPHILS NFR BLD: 74 % (ref 36–65)
NEUTS SEG NFR BLD: 4.16 K/UL (ref 1.5–8.1)
NITRITE UR QL STRIP: NEGATIVE
NRBC BLD-RTO: 0 PER 100 WBC
PH UR STRIP: 6.5 [PH] (ref 5–9)
PLATELET # BLD AUTO: 145 K/UL (ref 138–453)
PMV BLD AUTO: 9.9 FL (ref 8.1–13.5)
POTASSIUM SERPL-SCNC: 4.3 MMOL/L (ref 3.7–5.3)
PROT UR STRIP-MCNC: NEGATIVE MG/DL
PROTHROMBIN TIME: 23.3 SEC (ref 11.7–14.1)
RBC # BLD AUTO: 4.05 M/UL (ref 3.95–5.11)
RBC #/AREA URNS HPF: ABNORMAL /HPF (ref 0–2)
SODIUM SERPL-SCNC: 139 MMOL/L (ref 136–145)
SP GR UR STRIP: <1.005 (ref 1.01–1.02)
UROBILINOGEN UR STRIP-ACNC: NORMAL EU/DL (ref 0–1)
WBC #/AREA URNS HPF: ABNORMAL /HPF (ref 0–5)
WBC OTHER # BLD: 5.6 K/UL (ref 3.5–11.3)

## 2025-06-24 PROCEDURE — 80048 BASIC METABOLIC PNL TOTAL CA: CPT

## 2025-06-24 PROCEDURE — 85610 PROTHROMBIN TIME: CPT

## 2025-06-24 PROCEDURE — 99283 EMERGENCY DEPT VISIT LOW MDM: CPT

## 2025-06-24 PROCEDURE — 81001 URINALYSIS AUTO W/SCOPE: CPT

## 2025-06-24 PROCEDURE — 87086 URINE CULTURE/COLONY COUNT: CPT

## 2025-06-24 PROCEDURE — 85025 COMPLETE CBC W/AUTO DIFF WBC: CPT

## 2025-06-24 ASSESSMENT — LIFESTYLE VARIABLES
HOW MANY STANDARD DRINKS CONTAINING ALCOHOL DO YOU HAVE ON A TYPICAL DAY: PATIENT DOES NOT DRINK
HOW OFTEN DO YOU HAVE A DRINK CONTAINING ALCOHOL: NEVER

## 2025-06-24 ASSESSMENT — PAIN - FUNCTIONAL ASSESSMENT: PAIN_FUNCTIONAL_ASSESSMENT: NONE - DENIES PAIN

## 2025-06-24 NOTE — DISCHARGE INSTRUCTIONS
Call the Coumadin clinic today to discuss your dosage as your INR is 2.2 which is below your target between 2.5 and 3.5.  They may adjust your medication accordingly.    Return to the emergency department if you have abdominal pain fever urinary pain vomiting or any worsening symptoms.    You will receive a survey in the next couple days regarding your experience in the ED. We are constantly striving to improve our care and welcome your feedback. Thank you very much for your time.     The emergency department evaluation is not a complete evaluation, you're always required to followup with another doctor within the next few days to assess how your symptoms are progressing and to ensure that there is no indication for further testing or returning to the hospital.  Even with treatment sometimes your condition worsens and you will need to return to the hospital.  If you are having pain and it is getting worse you should return to the hospital.  If you have any new symptoms that were not addressed at your original visit you should return to the hospital. If you're having difficulty breathing but it is getting worse you should return to the hospital.  If you're vomiting and cannot take the medicines that were prescribed you should return to the hospital.  If you're having persistent fevers you should return to the hospital.  If you have any question of whether or not your symptoms are serious enough or for any other urgent concerns- always return to the hospital for repeat evaluation.

## 2025-06-24 NOTE — ED NOTES
Discussed home care and s/s to return to ED. Aware that follow up with PCP or urology is recommended. Informed that urine was sent for culture and we will call if an antibiotic is needed. Also verbalized understanding to contact coumadin clinic to see if medication needs adjusted.

## 2025-06-25 ENCOUNTER — TELEPHONE (OUTPATIENT)
Dept: UROLOGY | Age: 70
End: 2025-06-25

## 2025-06-25 LAB
MICROORGANISM SPEC CULT: NORMAL
SERVICE CMNT-IMP: NORMAL
SPECIMEN DESCRIPTION: NORMAL

## 2025-07-01 ENCOUNTER — ANTI-COAG VISIT (OUTPATIENT)
Age: 70
End: 2025-07-01
Payer: MEDICARE

## 2025-07-01 ENCOUNTER — TELEPHONE (OUTPATIENT)
Dept: CARDIOLOGY | Age: 70
End: 2025-07-01

## 2025-07-01 VITALS
BODY MASS INDEX: 18.42 KG/M2 | HEART RATE: 74 BPM | SYSTOLIC BLOOD PRESSURE: 164 MMHG | DIASTOLIC BLOOD PRESSURE: 78 MMHG | WEIGHT: 104 LBS

## 2025-07-01 DIAGNOSIS — Z95.2 S/P MVR (MITRAL VALVE REPLACEMENT): Primary | ICD-10-CM

## 2025-07-01 DIAGNOSIS — Z95.2 AORTIC VALVE REPLACED: ICD-10-CM

## 2025-07-01 LAB
INTERNATIONAL NORMALIZATION RATIO, POC: 2.5
PROTHROMBIN TIME, POC: 0

## 2025-07-01 PROCEDURE — 99212 OFFICE O/P EST SF 10 MIN: CPT | Performed by: COUNSELOR

## 2025-07-01 PROCEDURE — 85610 PROTHROMBIN TIME: CPT | Performed by: COUNSELOR

## 2025-07-01 NOTE — PROGRESS NOTES
[facility]  Medication Management  ANTICOAGULATION    Referring Provider: Dr. Velazquez    GOAL INR: 2.5 - 3.5    TODAY'S INR: 2.5    WARFARIN Dosage: Increase warfarin to 2.5 mg po every MWF and 5 mg po all other days    INR (no units)   Date Value   2025 2.2   2025 2.4   2024 2.5   2024 3.6   2024 2.6   2024 2.4   2024 3.4     INR,(POC) (no units)   Date Value   2025 2.5   06/10/2025 2.3   2025 2.1   2025 2.7   04/15/2025 2.6   2025 2.9   2025 3.4       Hemoglobin   Date Value Ref Range Status   2025 12.9 11.9 - 15.1 g/dL Final     Hematocrit   Date Value Ref Range Status   2025 39.3 36.3 - 47.1 % Final     ALT   Date Value Ref Range Status   2024 31 5 - 33 U/L Final     AST   Date Value Ref Range Status   2024 34 (H) <32 U/L Final       Medication changes:  No changes    Notes:    Fingerstick INR drawn per clinic protocol. Patient states no black tarry stool. Denies any missed doses of warfarin. No change in other maintenance medications. Will recheck INR in 2 weeks. Patient acknowledges working in consult agreement with pharmacist as referred by his/her physician.                Patient was seen in [facility] ED on 25 for hematuria.  Patient denies any further blood in urine.  INR was 2.3 on 25.  Patient was referred to urology with initial visit 25.    Patient has been eating more greens and cabbage this summer and plans to continue the same diet.  Will increase warfarin as above.     For Pharmacy Admin Tracking Only    Intervention Detail: Adherence Monitorin and Dose Adjustment: 1, reason: Improve Adherence, Therapy Optimization  Total # of Interventions Recommended: 2  Total # of Interventions Accepted: 2  Time Spent (min): 20      Annika Hernandez RPH

## 2025-07-16 ENCOUNTER — ANTI-COAG VISIT (OUTPATIENT)
Age: 70
End: 2025-07-16
Payer: MEDICARE

## 2025-07-16 VITALS
WEIGHT: 104 LBS | DIASTOLIC BLOOD PRESSURE: 65 MMHG | SYSTOLIC BLOOD PRESSURE: 131 MMHG | BODY MASS INDEX: 18.42 KG/M2 | HEART RATE: 62 BPM

## 2025-07-16 DIAGNOSIS — Z95.2 AORTIC VALVE REPLACED: ICD-10-CM

## 2025-07-16 DIAGNOSIS — Z95.2 S/P MVR (MITRAL VALVE REPLACEMENT): Primary | ICD-10-CM

## 2025-07-16 LAB
INTERNATIONAL NORMALIZATION RATIO, POC: 3.2
PROTHROMBIN TIME, POC: 0

## 2025-07-16 PROCEDURE — 99211 OFF/OP EST MAY X REQ PHY/QHP: CPT

## 2025-07-16 PROCEDURE — 85610 PROTHROMBIN TIME: CPT

## 2025-07-16 NOTE — PROGRESS NOTES
[facility]  Medication Management  ANTICOAGULATION    Referring Provider: Dr. Velazquez     GOAL INR: 2.5 - 3.5     TODAY'S INR: 3.2     WARFARIN Dosage: Continue warfarin half tablet for 2.5 mg MWF and 5 mg tablet all other days.    INR (no units)   Date Value   2025 2.2   2025 2.4   2024 2.5   2024 3.6   2024 2.6   2024 2.4   2024 3.4     INR,(POC) (no units)   Date Value   2025 2.5   06/10/2025 2.3   2025 2.1   2025 2.7   04/15/2025 2.6   2025 2.9   2025 3.4       Hemoglobin   Date Value Ref Range Status   2025 12.9 11.9 - 15.1 g/dL Final     Hematocrit   Date Value Ref Range Status   2025 39.3 36.3 - 47.1 % Final     ALT   Date Value Ref Range Status   2024 31 5 - 33 U/L Final     AST   Date Value Ref Range Status   2024 34 (H) <32 U/L Final       Medication changes:  none    Notes:    Fingerstick INR drawn per clinic protocol. Patient states no visible blood in urine and no black tarry stool. Denies any missed doses of warfarin. No change in other maintenance medications or in diet. Will recheck INR in 3 weeks. Patient acknowledges working in consult agreement with pharmacist as referred by his/her physician.                  For Pharmacy Admin Tracking Only    Intervention Detail: Adherence Monitorin  Total # of Interventions Recommended: 1  Total # of Interventions Accepted: 1  Time Spent (min): 20      Ofelia Sterling RPH, PharmD

## 2025-07-23 ENCOUNTER — OFFICE VISIT (OUTPATIENT)
Dept: UROLOGY | Age: 70
End: 2025-07-23
Payer: MEDICARE

## 2025-07-23 VITALS
SYSTOLIC BLOOD PRESSURE: 138 MMHG | DIASTOLIC BLOOD PRESSURE: 82 MMHG | WEIGHT: 103 LBS | TEMPERATURE: 96.9 F | BODY MASS INDEX: 18.25 KG/M2 | HEIGHT: 63 IN

## 2025-07-23 DIAGNOSIS — R31.0 GROSS HEMATURIA: Primary | ICD-10-CM

## 2025-07-23 DIAGNOSIS — R35.1 NOCTURIA: ICD-10-CM

## 2025-07-23 PROCEDURE — 51798 US URINE CAPACITY MEASURE: CPT | Performed by: PHYSICIAN ASSISTANT

## 2025-07-23 PROCEDURE — 99204 OFFICE O/P NEW MOD 45 MIN: CPT | Performed by: PHYSICIAN ASSISTANT

## 2025-07-23 PROCEDURE — 1123F ACP DISCUSS/DSCN MKR DOCD: CPT | Performed by: PHYSICIAN ASSISTANT

## 2025-07-23 PROCEDURE — 1159F MED LIST DOCD IN RCRD: CPT | Performed by: PHYSICIAN ASSISTANT

## 2025-07-23 NOTE — PROGRESS NOTES
Beth Vega is a 70 y.o. female in no acute distress. Beth Vega  is alert and oriented to person, place, and time.      Patient being seen here today as a new patient.  Patient was referred to us for gross hematuria.  Patient was referred by Olga Lidia FORRESTER.  She went to the emergency room on 6/24/2025 with blood in her urine.  She does take Coumadin for mechanical valve.  She patient states that she is uncertain whether or not the blood she is on her urine was from eating beets or not.  She did have a recent UA which did show 2-5 RBCs per high-powered field.  She had a negative urine culture for infection.  She has a daily bowel movement.  She does have urinary frequency.  She does have nocturia x 2. [place] performed in office today:  Patient voided - 20 mL, PVR - 0 mL    Past Medical History:   Diagnosis Date    Atrial fibrillation (HCC)     CAD (coronary artery disease)     CHF (congestive heart failure) (HCC)     Congestive heart failure (CHF) (HCC)     Depression 6/11/2015    H/O mitral valve replacement     H/O tricuspid valve repair     Hypertension     Ischemic colitis 2005, 2010    Migraine     Migraine     Migraine     PAF (paroxysmal atrial fibrillation) (HCC)     Rheumatic fever     S/P AVR     SOB (shortness of breath)          Past Surgical History:   Procedure Laterality Date    AORTIC VALVE REPLACEMENT  2008    CARDIAC CATHETERIZATION  2007    CARDIAC VALVE REPLACEMENT  2008    aortic and mitral    CHOLECYSTECTOMY  1997    COLONOSCOPY  2010    COLONOSCOPY N/A 5/15/2025    COLORECTAL CANCER SCREENING, NOT HIGH RISK performed by Katherine Roper DO at [facility] OR    MITRAL VALVE REPLACEMENT  2008    OVARIAN CYST REMOVAL Right 1977    PACEMAKER INSERTION  05/19/2023    PRE-MALIGNANT / BENIGN SKIN LESION EXCISION Left 05/02/2013    face    TONSILLECTOMY AND ADENOIDECTOMY      TRANSESOPHAGEAL ECHOCARDIOGRAM  05/16/2017    TUBAL LIGATION  1977         Outpatient Encounter Medications as of

## 2025-07-29 ENCOUNTER — HOSPITAL ENCOUNTER (OUTPATIENT)
Age: 70
End: 2025-07-29
Payer: MEDICARE

## 2025-07-29 ENCOUNTER — HOSPITAL ENCOUNTER (OUTPATIENT)
Age: 70
Discharge: HOME | End: 2025-07-29
Payer: MEDICARE

## 2025-07-29 DIAGNOSIS — R31.0 GROSS HEMATURIA: ICD-10-CM

## 2025-07-29 LAB
BUN SERPL-MCNC: 17 MG/DL (ref 8–23)
CREAT SERPL-MCNC: 0.9 MG/DL (ref 0.5–0.9)
GFR, ESTIMATED: 70 ML/MIN/1.73M2

## 2025-07-29 PROCEDURE — 84520 ASSAY OF UREA NITROGEN: CPT

## 2025-07-29 PROCEDURE — 82565 ASSAY OF CREATININE: CPT

## 2025-07-29 PROCEDURE — 36415 COLL VENOUS BLD VENIPUNCTURE: CPT

## 2025-08-05 ENCOUNTER — HOSPITAL ENCOUNTER (OUTPATIENT)
Dept: CT IMAGING | Age: 70
Discharge: HOME | End: 2025-08-07
Attending: UROLOGY
Payer: MEDICARE

## 2025-08-05 ENCOUNTER — ANTI-COAG VISIT (OUTPATIENT)
Age: 70
End: 2025-08-05
Payer: MEDICARE

## 2025-08-05 VITALS
HEART RATE: 71 BPM | SYSTOLIC BLOOD PRESSURE: 154 MMHG | BODY MASS INDEX: 18.07 KG/M2 | WEIGHT: 102 LBS | DIASTOLIC BLOOD PRESSURE: 70 MMHG

## 2025-08-05 DIAGNOSIS — Z95.2 AORTIC VALVE REPLACED: ICD-10-CM

## 2025-08-05 DIAGNOSIS — Z95.2 S/P MVR (MITRAL VALVE REPLACEMENT): Primary | ICD-10-CM

## 2025-08-05 DIAGNOSIS — R31.0 GROSS HEMATURIA: ICD-10-CM

## 2025-08-05 LAB
INTERNATIONAL NORMALIZATION RATIO, POC: 5.5
PROTHROMBIN TIME, POC: 0

## 2025-08-05 PROCEDURE — 99212 OFFICE O/P EST SF 10 MIN: CPT | Performed by: COUNSELOR

## 2025-08-05 PROCEDURE — 85610 PROTHROMBIN TIME: CPT | Performed by: COUNSELOR

## 2025-08-05 PROCEDURE — 6360000004 HC RX CONTRAST MEDICATION: Performed by: UROLOGY

## 2025-08-05 PROCEDURE — 74178 CT ABD&PLV WO CNTR FLWD CNTR: CPT | Performed by: UROLOGY

## 2025-08-05 RX ORDER — IOPAMIDOL 755 MG/ML
120 INJECTION, SOLUTION INTRAVASCULAR
Status: COMPLETED | OUTPATIENT
Start: 2025-08-05 | End: 2025-08-05

## 2025-08-05 RX ADMIN — IOPAMIDOL 120 ML: 755 INJECTION, SOLUTION INTRAVENOUS at 09:05

## 2025-08-18 ENCOUNTER — PROCEDURE VISIT (OUTPATIENT)
Dept: UROLOGY | Age: 70
End: 2025-08-18
Payer: MEDICARE

## 2025-08-18 VITALS
BODY MASS INDEX: 18.07 KG/M2 | HEART RATE: 62 BPM | TEMPERATURE: 97.7 F | SYSTOLIC BLOOD PRESSURE: 151 MMHG | DIASTOLIC BLOOD PRESSURE: 71 MMHG | WEIGHT: 102 LBS

## 2025-08-18 DIAGNOSIS — R31.0 GROSS HEMATURIA: Primary | ICD-10-CM

## 2025-08-18 DIAGNOSIS — R35.1 NOCTURIA: ICD-10-CM

## 2025-08-18 DIAGNOSIS — N20.0 RIGHT KIDNEY STONE: ICD-10-CM

## 2025-08-18 DIAGNOSIS — N20.0 RENAL CALCULUS: ICD-10-CM

## 2025-08-18 PROCEDURE — 1159F MED LIST DOCD IN RCRD: CPT | Performed by: UROLOGY

## 2025-08-18 PROCEDURE — 1123F ACP DISCUSS/DSCN MKR DOCD: CPT | Performed by: UROLOGY

## 2025-08-18 PROCEDURE — 99215 OFFICE O/P EST HI 40 MIN: CPT | Performed by: UROLOGY

## 2025-08-18 PROCEDURE — 52000 CYSTOURETHROSCOPY: CPT | Performed by: UROLOGY

## 2025-08-19 ENCOUNTER — ANTI-COAG VISIT (OUTPATIENT)
Age: 70
End: 2025-08-19
Payer: MEDICARE

## 2025-08-19 VITALS
SYSTOLIC BLOOD PRESSURE: 135 MMHG | DIASTOLIC BLOOD PRESSURE: 90 MMHG | HEART RATE: 133 BPM | BODY MASS INDEX: 17.89 KG/M2 | WEIGHT: 101 LBS

## 2025-08-19 DIAGNOSIS — Z95.2 S/P MVR (MITRAL VALVE REPLACEMENT): Primary | ICD-10-CM

## 2025-08-19 DIAGNOSIS — Z95.2 AORTIC VALVE REPLACED: ICD-10-CM

## 2025-08-19 LAB
INTERNATIONAL NORMALIZATION RATIO, POC: 2.6
PROTHROMBIN TIME, POC: NORMAL

## 2025-08-19 PROCEDURE — 99211 OFF/OP EST MAY X REQ PHY/QHP: CPT | Performed by: COUNSELOR

## 2025-08-19 PROCEDURE — 85610 PROTHROMBIN TIME: CPT | Performed by: COUNSELOR

## 2025-08-27 ENCOUNTER — TELEPHONE (OUTPATIENT)
Dept: UROLOGY | Age: 70
End: 2025-08-27

## 2025-09-04 ENCOUNTER — ANTI-COAG VISIT (OUTPATIENT)
Age: 70
End: 2025-09-04
Payer: MEDICARE

## 2025-09-04 VITALS
BODY MASS INDEX: 17.51 KG/M2 | WEIGHT: 102 LBS | SYSTOLIC BLOOD PRESSURE: 137 MMHG | HEART RATE: 77 BPM | DIASTOLIC BLOOD PRESSURE: 64 MMHG

## 2025-09-04 DIAGNOSIS — Z95.2 AORTIC VALVE REPLACED: ICD-10-CM

## 2025-09-04 DIAGNOSIS — Z95.2 S/P MVR (MITRAL VALVE REPLACEMENT): Primary | ICD-10-CM

## 2025-09-04 LAB
INTERNATIONAL NORMALIZATION RATIO, POC: 2.5
PROTHROMBIN TIME, POC: NORMAL

## 2025-09-04 PROCEDURE — 99211 OFF/OP EST MAY X REQ PHY/QHP: CPT

## 2025-09-04 PROCEDURE — 85610 PROTHROMBIN TIME: CPT

## (undated) DEVICE — ENDOSCOPIC KIT 1.1+ 10 FT OP4 CA DE 2 GWN AAMI LEVEL 3

## (undated) DEVICE — TUBING SUCT NON-STRL 9/32X100 W/CNNT

## (undated) DEVICE — SOLUTION IRRIG 1000ML 0.9% SOD CHL USP POUR PLAS BTL

## (undated) DEVICE — CANNULA ORAL NSL AD CO2 N INTUB O2 DEL DISP TRU LNK